# Patient Record
Sex: FEMALE | Race: WHITE | NOT HISPANIC OR LATINO | Employment: OTHER | URBAN - METROPOLITAN AREA
[De-identification: names, ages, dates, MRNs, and addresses within clinical notes are randomized per-mention and may not be internally consistent; named-entity substitution may affect disease eponyms.]

---

## 2019-05-14 ASSESSMENT — MIFFLIN-ST. JEOR: SCORE: 1079.84

## 2019-05-15 ENCOUNTER — COMMUNICATION - HEALTHEAST (OUTPATIENT)
Dept: SCHEDULING | Facility: CLINIC | Age: 59
End: 2019-05-15

## 2019-05-15 ENCOUNTER — ANESTHESIA - HEALTHEAST (OUTPATIENT)
Dept: SURGERY | Facility: CLINIC | Age: 59
End: 2019-05-15

## 2019-05-15 ENCOUNTER — SURGERY - HEALTHEAST (OUTPATIENT)
Dept: SURGERY | Facility: CLINIC | Age: 59
End: 2019-05-15

## 2019-05-15 ASSESSMENT — MIFFLIN-ST. JEOR: SCORE: 1215.47

## 2019-08-27 ENCOUNTER — ANESTHESIA - HEALTHEAST (OUTPATIENT)
Dept: SURGERY | Facility: CLINIC | Age: 59
End: 2019-08-27

## 2019-08-27 ASSESSMENT — MIFFLIN-ST. JEOR: SCORE: 1138.81

## 2019-08-28 ENCOUNTER — SURGERY - HEALTHEAST (OUTPATIENT)
Dept: SURGERY | Facility: CLINIC | Age: 59
End: 2019-08-28

## 2019-08-28 ASSESSMENT — MIFFLIN-ST. JEOR: SCORE: 1134.56

## 2020-03-09 ENCOUNTER — TRANSCRIBE ORDERS (OUTPATIENT)
Dept: OTHER | Age: 60
End: 2020-03-09

## 2020-03-09 DIAGNOSIS — H46.12 RETROBULBAR NEURITIS OF LEFT EYE: Primary | ICD-10-CM

## 2021-05-26 ENCOUNTER — RECORDS - HEALTHEAST (OUTPATIENT)
Dept: ADMINISTRATIVE | Facility: CLINIC | Age: 61
End: 2021-05-26

## 2021-05-27 ENCOUNTER — RECORDS - HEALTHEAST (OUTPATIENT)
Dept: ADMINISTRATIVE | Facility: CLINIC | Age: 61
End: 2021-05-27

## 2021-05-28 ENCOUNTER — RECORDS - HEALTHEAST (OUTPATIENT)
Dept: ADMINISTRATIVE | Facility: CLINIC | Age: 61
End: 2021-05-28

## 2021-05-28 NOTE — TELEPHONE ENCOUNTER
"\"Why can't I shower? I am itching and I need to shower? Can't I put a bag over my stitches.\"     Patient states she it itching all over and cannot stand it any longer.   -Thinks it is from what she was cleaned with during the procedure.   -Itching is all over her back side    Had an interstim placed today by Dr. Muñiz at [facility] at [facility].     Okay to leave a detailed message on voicemail.     Called [facility] for the patient to have the On-Call provider call her to discuss her options.   -Dr. Edenilson bernabe.     Reason for Disposition    [1] Caller has URGENT question AND [2] triager unable to answer question    Protocols used: POST-OP SYMPTOMS AND [id number] Max RN Triage Nurse Advisor Care Connection    "

## 2021-05-28 NOTE — ANESTHESIA POSTPROCEDURE EVALUATION
Patient: Rukhsana Griffith  NEW INTERSTIM LEAD, REPLACE BATTERY, PARTIAL REMOVAL OLD LEAD  Anesthesia type: MAC    Patient location: Phase II Recovery  Last vitals:   Vitals Value Taken Time   /66 5/15/2019 12:20 PM   Temp 36.4  C (97.6  F) 5/15/2019 11:47 AM   Pulse 105 5/15/2019 12:44 PM   Resp 16 5/15/2019 12:20 PM   SpO2 92 % 5/15/2019  1:08 PM   Vitals shown include unvalidated device data.  Post vital signs: stable  Level of consciousness: awake and responds to simple questions  Post-anesthesia pain: pain controlled  Post-anesthesia nausea and vomiting: no  Pulmonary: unassisted, return to baseline  Cardiovascular: stable and blood pressure at baseline  Hydration: adequate  Anesthetic events: no    QCDR Measures:  ASA# 11 - Lesa-op Cardiac Arrest: ASA11B - Patient did NOT experience unanticipated cardiac arrest  ASA# 12 - Lesa-op Mortality Rate: ASA12B - Patient did NOT die  ASA# 13 - PACU Re-Intubation Rate: NA - No ETT / LMA used for case  ASA# 10 - Composite Anes Safety: ASA10A - No serious adverse event    Additional Notes:

## 2021-05-28 NOTE — ANESTHESIA CARE TRANSFER NOTE
Last vitals:   Vitals:    05/15/19 1147   BP: (P) 117/58   Pulse: (P) 99   Resp: (P) 14   Temp: (P) 36.4  C (97.6  F)   SpO2: (P) 98%     Patient's level of consciousness is drowsy  Spontaneous respirations: yes  Maintains airway independently: yes  Dentition unchanged: yes  Oropharynx: oropharynx clear of all foreign objects    QCDR Measures:ASA# 20 - Surgical Safety Checklist: WHO surgical safety checklist completed prior to induction    PQRS# 430 - Adult PONV Prevention: 4558F - Pt received => 2 anti-emetic agents (different classes) preop & intraop  ASA# 8 - Peds PONV Prevention: NA - Not pediatric patient, not GA or 2 or more risk factors NOT present  PQRS# 424 - Lesa-op Temp Management: 4559F - At least one body temp DOCUMENTED => 35.5C or 95.9F within required timeframe  PQRS# 426 - PACU Transfer Protocol: - Transfer of care checklist used  ASA# 14 - Acute Post-op Pain: ASA14B - Patient did NOT experience pain >= 7 out of 10

## 2021-05-28 NOTE — ANESTHESIA PREPROCEDURE EVALUATION
Anesthesia Evaluation      No history of anesthetic complications     Airway   Mallampati: III  Neck ROM: full   Pulmonary    (+) COPD ( took nebs this am) moderate, sleep apnea (noncompliant), wheezes (mild end expiratory), a smoker                         Cardiovascular   (+) hypertension, ,     (-) CAD  ECG reviewed  Rhythm: regular  Rate: normal,         Neuro/Psych    (+) seizures (last seizure one week ago),     Comments: Interstim for urge incontinence, s/f battery replacement    Schizoaffective disorder, bipolar 1    Endo/Other    (+) diabetes mellitus type 2 using insulin,      GI/Hepatic/Renal    (+) GERD well controlled,       Comments: Hx of gastroparesis, well controlled with gastric pacemaker. No sx of early satiety or nausea.           Dental                         Anesthesia Plan  Planned anesthetic: MAC  Local per surgeon  Plan for propofol gtt w/ ketamine / fentanyl PRN for pain.  Discussed potential need to convert to GA w/ ETT vs LMA if not tolerating.  Explained that it is possible to remember certain aspects of the OR experience during MAC dependent on the depth of sedation and patient understands this.    ASA 3

## 2021-05-29 ENCOUNTER — RECORDS - HEALTHEAST (OUTPATIENT)
Dept: ADMINISTRATIVE | Facility: CLINIC | Age: 61
End: 2021-05-29

## 2021-05-30 ENCOUNTER — RECORDS - HEALTHEAST (OUTPATIENT)
Dept: ADMINISTRATIVE | Facility: CLINIC | Age: 61
End: 2021-05-30

## 2021-05-31 NOTE — ANESTHESIA POSTPROCEDURE EVALUATION
Patient: Rukhsana Griffith  NEW INTERSTIM LEAD, REPLACE OLD, REPLACE BATTERY  Anesthesia type: MAC    Patient location: Phase II Recovery  Last vitals: No vitals data found for the desired time range.    Post vital signs: stable  Level of consciousness: awake and responds to simple questions  Post-anesthesia pain: pain controlled  Post-anesthesia nausea and vomiting: no  Pulmonary: unassisted, return to baseline  Cardiovascular: stable and blood pressure at baseline  Hydration: adequate  Anesthetic events: no    QCDR Measures:  ASA# 11 - Lesa-op Cardiac Arrest: ASA11B - Patient did NOT experience unanticipated cardiac arrest  ASA# 12 - Lesa-op Mortality Rate: ASA12B - Patient did NOT die  ASA# 13 - PACU Re-Intubation Rate: NA - No ETT / LMA used for case  ASA# 10 - Composite Anes Safety: ASA10A - No serious adverse event    Additional Notes:

## 2021-05-31 NOTE — ANESTHESIA PREPROCEDURE EVALUATION
Anesthesia Evaluation      No history of anesthetic complications     Airway   Mallampati: III  Neck ROM: full   Pulmonary    (+) COPD ( took nebs this am) moderate, sleep apnea (noncompliant), wheezes (mild end expiratory), a smoker                         Cardiovascular   (+) hypertension, ,     (-) CAD  ECG reviewed  Rhythm: regular  Rate: normal,         Neuro/Psych    (+) seizures (last seizure one week ago),     Comments: Interstim for urge incontinence, s/f battery replacement    Schizoaffective disorder, bipolar 1    Endo/Other    (+) diabetes mellitus type 2 using insulin,      GI/Hepatic/Renal    (+) GERD well controlled,       Comments: Hx of gastroparesis, well controlled with gastric pacemaker. No sx of early satiety or nausea.           Dental                         Anesthesia Plan  Planned anesthetic: MAC  Local per surgeon  Plan for propofol gtt w/ ketamine / fentanyl PRN for pain.  Discussed potential need to convert to GA w/ ETT vs LMA if not tolerating.  Explained that it is possible to remember certain aspects of the OR experience during MAC dependent on the depth of sedation and patient understands this.    ASA 3                 Anesthesia Evaluation      Patient summary reviewed   No history of anesthetic complications     Airway   Mallampati: II  Neck ROM: full   Pulmonary - normal exam   (+) COPD moderate, asthma  moderate, sleep apnea on CPAP, ,                          Cardiovascular - normal exam  (+) hypertension, ,      Neuro/Psych    (+) neuromuscular disease,      Endo/Other    (+) diabetes mellitus,      GI/Hepatic/Renal    (+) GERD,             Dental - normal exam                        Anesthesia Plan  Planned anesthetic: MAC    ASA 3     Anesthetic plan and risks discussed with: patient  Anesthesia plan special considerations: antiemetics,   Post-op plan: routine recovery

## 2021-05-31 NOTE — ANESTHESIA CARE TRANSFER NOTE
Last vitals:   Vitals:    08/28/19 1145   BP: 147/69   Pulse: 93   Resp: 18   Temp: 36.3  C (97.3  F)   SpO2: 100%     Patient's level of consciousness is awake  Spontaneous respirations: yes  Maintains airway independently: yes  Dentition unchanged: yes  Oropharynx: oropharynx clear of all foreign objects    QCDR Measures:  ASA# 20 - Surgical Safety Checklist: WHO surgical safety checklist completed prior to induction    PQRS# 430 - Adult PONV Prevention: 4558F-8P - Pt did NOT receive => 2 anti-emetic agents  ASA# 8 - Peds PONV Prevention: NA - Not pediatric patient, not GA or 2 or more risk factors NOT present  PQRS# 424 - Lesa-op Temp Management: 4559F - At least one body temp DOCUMENTED => 35.5C or 95.9F within required timeframe  PQRS# 426 - PACU Transfer Protocol: - Transfer of care checklist used  ASA# 14 - Acute Post-op Pain: ASA14B - Patient did NOT experience pain >= 7 out of 10

## 2021-06-03 VITALS — WEIGHT: 143.06 LBS | BODY MASS INDEX: 28.84 KG/M2 | HEIGHT: 59 IN

## 2021-06-03 VITALS — WEIGHT: 153.9 LBS | BODY MASS INDEX: 29.06 KG/M2 | HEIGHT: 61 IN

## 2021-06-16 PROBLEM — R07.9 CHEST PAIN: Status: ACTIVE | Noted: 2017-09-03

## 2021-06-16 PROBLEM — E16.2 HYPOGLYCEMIA: Status: ACTIVE | Noted: 2018-02-04

## 2021-06-16 PROBLEM — D50.9 MICROCYTIC HYPOCHROMIC ANEMIA: Status: ACTIVE | Noted: 2018-02-04

## 2021-06-16 PROBLEM — K30 DELAYED GASTRIC EMPTYING: Status: ACTIVE | Noted: 2018-02-04

## 2021-06-16 PROBLEM — R10.9 ABDOMINAL PAIN: Status: ACTIVE | Noted: 2018-02-04

## 2022-05-26 ENCOUNTER — TRANSCRIBE ORDERS (OUTPATIENT)
Dept: OTHER | Age: 62
End: 2022-05-26
Payer: MEDICAID

## 2022-05-26 DIAGNOSIS — H53.462 LEFT HOMONYMOUS HEMIANOPSIA: Primary | ICD-10-CM

## 2022-05-26 DIAGNOSIS — H46.13 RETROBULBAR NEURITIS OF BOTH EYES: ICD-10-CM

## 2022-12-19 ENCOUNTER — APPOINTMENT (OUTPATIENT)
Dept: RADIOLOGY | Facility: HOSPITAL | Age: 62
End: 2022-12-19
Attending: EMERGENCY MEDICINE
Payer: MEDICAID

## 2022-12-19 ENCOUNTER — HOSPITAL ENCOUNTER (EMERGENCY)
Facility: HOSPITAL | Age: 62
Discharge: HOME | End: 2022-12-19
Attending: EMERGENCY MEDICINE | Admitting: EMERGENCY MEDICINE
Payer: MEDICAID

## 2022-12-19 VITALS
SYSTOLIC BLOOD PRESSURE: 135 MMHG | OXYGEN SATURATION: 96 % | WEIGHT: 152 LBS | BODY MASS INDEX: 30.64 KG/M2 | TEMPERATURE: 97.9 F | RESPIRATION RATE: 18 BRPM | DIASTOLIC BLOOD PRESSURE: 68 MMHG | HEART RATE: 88 BPM | HEIGHT: 59 IN

## 2022-12-19 DIAGNOSIS — M25.562 ACUTE PAIN OF LEFT KNEE: ICD-10-CM

## 2022-12-19 PROBLEM — F15.11 HISTORY OF METHAMPHETAMINE ABUSE (H): Status: ACTIVE | Noted: 2022-07-28

## 2022-12-19 PROBLEM — H46.13: Status: ACTIVE | Noted: 2022-05-26

## 2022-12-19 PROBLEM — M54.81 OCCIPITAL NEURALGIA OF LEFT SIDE: Status: ACTIVE | Noted: 2020-02-07

## 2022-12-19 PROBLEM — J30.89 PERENNIAL ALLERGIC RHINITIS: Status: ACTIVE | Noted: 2018-06-28

## 2022-12-19 PROBLEM — J98.4 CALCIFIED GRANULOMA OF LUNG: Status: ACTIVE | Noted: 2018-11-19

## 2022-12-19 PROBLEM — R41.82 ALTERED MENTAL STATUS, UNSPECIFIED: Status: ACTIVE | Noted: 2022-05-14

## 2022-12-19 PROBLEM — F51.04 PSYCHOPHYSIOLOGICAL INSOMNIA: Status: ACTIVE | Noted: 2017-11-10

## 2022-12-19 PROBLEM — R94.31 PROLONGED Q-T INTERVAL ON ECG: Status: ACTIVE | Noted: 2022-06-16

## 2022-12-19 PROBLEM — J44.9 COPD MIXED TYPE (H): Status: ACTIVE | Noted: 2022-12-19

## 2022-12-19 PROBLEM — M54.16 RIGHT LUMBAR RADICULOPATHY: Status: ACTIVE | Noted: 2022-12-19

## 2022-12-19 PROBLEM — N39.3 STRESS INCONTINENCE OF URINE: Status: ACTIVE | Noted: 2021-09-13

## 2022-12-19 PROBLEM — H53.462 LEFT HOMONYMOUS HEMIANOPSIA: Status: ACTIVE | Noted: 2022-05-26

## 2022-12-19 PROBLEM — H90.A31 MIXED CONDUCTIVE AND SENSORINEURAL HEARING LOSS OF RIGHT EAR WITH RESTRICTED HEARING OF LEFT EAR: Status: ACTIVE | Noted: 2022-04-26

## 2022-12-19 PROBLEM — R10.2 PELVIC AND PERINEAL PAIN: Status: ACTIVE | Noted: 2019-04-02

## 2022-12-19 PROBLEM — R29.898 WEAKNESS OF RIGHT FOOT: Status: ACTIVE | Noted: 2021-09-13

## 2022-12-19 PROBLEM — H90.A22 SENSORINEURAL HEARING LOSS (SNHL) OF LEFT EAR WITH RESTRICTED HEARING OF RIGHT EAR: Status: ACTIVE | Noted: 2022-04-26

## 2022-12-19 PROBLEM — D50.9 IRON DEFICIENCY ANEMIA: Status: ACTIVE | Noted: 2022-12-19

## 2022-12-19 PROBLEM — H92.01 OTALGIA, RIGHT: Status: ACTIVE | Noted: 2022-04-26

## 2022-12-19 PROCEDURE — 250N000011 HC RX IP 250 OP 636: Performed by: EMERGENCY MEDICINE

## 2022-12-19 PROCEDURE — 99285 EMERGENCY DEPT VISIT HI MDM: CPT | Mod: 25

## 2022-12-19 PROCEDURE — 73562 X-RAY EXAM OF KNEE 3: CPT | Mod: LT

## 2022-12-19 PROCEDURE — 96372 THER/PROPH/DIAG INJ SC/IM: CPT | Performed by: EMERGENCY MEDICINE

## 2022-12-19 PROCEDURE — 29505 APPLICATION LONG LEG SPLINT: CPT | Mod: LT

## 2022-12-19 RX ORDER — MORPHINE SULFATE 4 MG/ML
4 INJECTION, SOLUTION INTRAMUSCULAR; INTRAVENOUS ONCE
Status: DISCONTINUED | OUTPATIENT
Start: 2022-12-19 | End: 2022-12-19

## 2022-12-19 RX ORDER — MORPHINE SULFATE 4 MG/ML
4 INJECTION, SOLUTION INTRAMUSCULAR; INTRAVENOUS ONCE
Status: COMPLETED | OUTPATIENT
Start: 2022-12-19 | End: 2022-12-19

## 2022-12-19 RX ADMIN — MORPHINE SULFATE 4 MG: 4 INJECTION INTRAVENOUS at 17:47

## 2022-12-19 ASSESSMENT — ACTIVITIES OF DAILY LIVING (ADL): ADLS_ACUITY_SCORE: 35

## 2022-12-19 NOTE — ED PROVIDER NOTES
EMERGENCY DEPARTMENT ENCOUNTER      NAME: Rukhsana Griffith  AGE: 62 year old female  YOB: 1960  MRN: [id number]  EVALUATION DATE & TIME: 12/19/2022  5:29 PM    PCP: Kei De Leon    ED PROVIDER: Brynn Romero MD    Chief Complaint   Patient presents with     Knee Injury         FINAL IMPRESSION:  1. Acute pain of left knee          ED COURSE & MEDICAL DECISION MAKING:    Pertinent Labs & Imaging studies reviewed. (See chart for details)  62 year old female with history of DM, COPD, HLD, methamphetamine abuse, schizoaffective and seizure disorder who presents to the Emergency Department for evaluation of left-sided knee pain after feeling a pop when going from a seated to a standing position.  Given minimal/if at all no trauma my concern for underlying fracture is low.  Overall favor possible self reduced patellar dislocation as she does have a history of same.  Differential includes meniscus or ligamentous injury.    Patient given IM morphine for pain.  X-ray unremarkable.  No signs of fracture.  Also no effusion for internal derangement to the knee.  Placed in knee immobilizer.  Able to ambulate with walker.  Will be discharged back to care center.           5:29 PM Introduced myself to the patient, obtained history of present illness, and performed initial physical exam at this time. PPE: Provider wore gloves, N95 mask.     6:36 PM Checked on the patient and updated her on the current treatment plan.    Medical Decision Making    History:    Supplemental history from: Documented in HPI, if applicable    External Record(s) reviewed: Documented in HPI, if applicable.    Work Up:    Chart documentation includes differential considered and any EKGs or imaging interpreted by provider.    In additional to work up documented, I considered the following work up: See chart documentation, if applicable.    External consultation:    Discussion of management with another provider: See chart  documentation, if applicable    Complicating factors:    Care impacted by chronic illness: Chronic Lung Disease and Diabetes    Care affected by social determinants of health: N/A    Disposition considerations: Discharge. I prescribed additional prescription strength medication(s) as charted. N/A.        At the conclusion of the encounter I discussed the results of all of the tests and the disposition. The questions were answered. The patient or family acknowledged understanding and was agreeable with the care plan.      MEDICATIONS GIVEN IN THE EMERGENCY:  Medications   morphine (PF) injection 4 mg (4 mg Intramuscular Given 12/19/22 2077)       NEW PRESCRIPTIONS STARTED AT TODAY'S ER VISIT  Discharge Medication List as of 12/19/2022  7:29 PM      START taking these medications    Details   acetaminophen-codeine (TYLENOL #3) 300-30 MG tablet Take 1 tablet by mouth every 6 hours as needed for severe pain (7-10), Disp-10 tablet, R-0, Local Print                =================================================================    HPI    Patient information was obtained from: the patient    Use of Intrepreter: N/A      Rukhsana Griffith is a 62 year old female with pertinent medical history of chronic hyponatremia, dyslipidemia, schizoaffective disorder, seizure disorder, and type II DM, who presents for evaluation of knee pain.    Earlier this evening, the patient was seated with her legs crossed. When she tried to stand up, she felt her left knee pop and had sudden onset of severe knee pain. She rates the pain a 10/10. She is unable to bear weight on the knee. The patient reports a history of patellar dislocation in this knee that warranted surgical correction. She states that the pain is located on the front and medial aspects of the knee. No pain to the back of the knee. No pain medication at home prior to ED arrival. No other complaints at this time.      REVIEW OF SYSTEMS  Constitutional:  Denies fever, chills,  weight loss or weakness  GI:  Denies abdominal pain, nausea, vomiting, diarrhea  : Denies dysuria, denies hematuria  Musculoskeletal:  Positive for left knee pain.   Skin:  Denies rash, pallor  Neurologic:  Denies headache, focal weakness or sensory changes    All other systems negative unless noted in HPI.      PAST MEDICAL HISTORY:  Past Medical History:   Diagnosis Date     COPD (chronic obstructive pulmonary disease) (H)      Diabetes (H)      HLD (hyperlipidemia)      Methamphetamine abuse (H)      Schizoaffective disorder (H)      Seizures (H)        PAST SURGICAL HISTORY:  Past Surgical History:   Procedure Laterality Date     HYSTERECTOMY       KNEE SURGERY       OTHER SURGICAL HISTORY      gastric pacemaker     OTHER SURGICAL HISTORY      interstim placement     WA REVISE/REMOVE PERIPH/GASTRIC NEUROSTIM/ N/A 5/15/2019    Procedure: PARTIAL REMOVAL OLD LEAD;  Surgeon: Jcarlos Muñiz MD;  Location: [facility];  Service: Urology     RELEASE CARPAL TUNNEL         CURRENT MEDICATIONS:    Prior to Admission Medications   Prescriptions Last Dose Informant Patient Reported? Taking?   EPINEPHrine (EPIPEN) 0.3 mg/0.3 mL atIn   Yes No   Sig: [EPINEPHRINE (EPIPEN) 0.3 MG/0.3 ML ATIN] Inject 0.3 mg into the shoulder, thigh, or buttocks as needed.   SUMAtriptan (IMITREX) 100 MG tablet   Yes No   Sig: [SUMATRIPTAN (IMITREX) 100 MG TABLET] Take 100 mg by mouth every 2 (two) hours as needed for migraine.   acetaminophen (TYLENOL) 160 mg/5 mL (5 mL) Soln solution   Yes No   Sig: [ACETAMINOPHEN (TYLENOL) 160 MG/5 ML (5 ML) SOLN SOLUTION] Take 10 mL by mouth every 4 (four) hours as needed (Pain).          albuterol (PROAIR HFA;PROVENTIL HFA;VENTOLIN HFA) 90 mcg/actuation inhaler   Yes No   Sig: [ALBUTEROL (PROAIR HFA;PROVENTIL HFA;VENTOLIN HFA) 90 MCG/ACTUATION INHALER] Inhale 2 puffs every 6 (six) hours as needed for wheezing.   albuterol (PROVENTIL) 2.5 mg /3 mL (0.083 %) nebulizer solution   Yes No    Sig: [ALBUTEROL (PROVENTIL) 2.5 MG /3 ML (0.083 %) NEBULIZER SOLUTION] Take 2.5 mg by nebulization every 4 (four) hours as needed for wheezing.          aluminum-magnesium hydroxide-simethicone (MAALOX MAX STRENGTH) 400-400-40 mg/5 mL suspension   Yes No   Sig: [ALUMINUM-MAGNESIUM HYDROXIDE-SIMETHICONE (MAALOX MAX STRENGTH) 400-400-40 MG/5 ML SUSPENSION] Take 5 mL by mouth every 4 (four) hours as needed for indigestion.          ammonium lactate (AMLACTIN) 12 % cream   Yes No   Sig: [AMMONIUM LACTATE (AMLACTIN) 12 % CREAM] Apply 1 application topically 2 (two) times a day.          atropine 1 % ophthalmic solution   Yes No   Sig: [ATROPINE 1 % OPHTHALMIC SOLUTION] Place 1-2 drops under the tongue 2 (two) times a day. Takes under tongue to stop drooling         budesonide-formoterol (SYMBICORT) 160-4.5 mcg/actuation inhaler   Yes No   Sig: [BUDESONIDE-FORMOTEROL (SYMBICORT) 160-4.5 MCG/ACTUATION INHALER] Inhale 2 puffs 2 (two) times a day.          cholecalciferol, vitamin D3, 2,000 unit Tab   Yes No   Sig: [CHOLECALCIFEROL, VITAMIN D3, 2,000 UNIT TAB] Take 2,000 Units by mouth daily.   cloZAPine (CLOZARIL) 100 MG tablet   Yes No   Sig: [CLOZAPINE (CLOZARIL) 100 MG TABLET] Take 200 mg by mouth at bedtime.          diphenhydrAMINE (BENADRYL) 25 mg capsule   Yes No   Sig: [DIPHENHYDRAMINE (BENADRYL) 25 MG CAPSULE] Take 25 mg by mouth at bedtime as needed for itching.   docusate sodium (COLACE) 100 MG capsule   Yes No   Sig: [DOCUSATE SODIUM (COLACE) 100 MG CAPSULE] Take 100 mg by mouth 2 (two) times a day.          escitalopram oxalate (LEXAPRO) 10 MG tablet   Yes No   Sig: [ESCITALOPRAM OXALATE (LEXAPRO) 10 MG TABLET] Take 10 mg by mouth daily.          famotidine (PEPCID) 40 MG tablet   Yes No   Sig: [FAMOTIDINE (PEPCID) 40 MG TABLET] Take 40 mg by mouth 2 (two) times a day.   fluoride, sodium, (DENTAGEL) 1.1 % Gel dental gel   Yes No   Sig: [FLUORIDE, SODIUM, (DENTAGEL) 1.1 % GEL DENTAL GEL] Apply 1 application to  teeth at bedtime.   glucagon, human recombinant, (GLUCAGEN HYPOKIT) 1 mg injection   Yes No   Sig: [GLUCAGON, HUMAN RECOMBINANT, (GLUCAGEN HYPOKIT) 1 MG INJECTION] Infuse 1 mg into a venous catheter once as needed (Low blood sugars).          insulin aspart U-100 (NOVOLOG) 100 unit/mL injection   Yes No   Sig: [INSULIN ASPART U-100 (NOVOLOG) 100 UNIT/ML INJECTION] Inject 15 Units under the skin 3 (three) times a day before meals. Sliding scale          insulin glargine (LANTUS) 100 unit/mL injection   Yes No   Sig: [INSULIN GLARGINE (LANTUS) 100 UNIT/ML INJECTION] Inject 25 Units under the skin every morning.          ipratropium-albuterol (DUO-NEB) 0.5-2.5 mg/3 mL nebulizer   Yes No   Sig: [IPRATROPIUM-ALBUTEROL (DUO-NEB) 0.5-2.5 MG/3 ML NEBULIZER] Take 3 mL by nebulization 4 (four) times a day as needed.          lisinopril (PRINIVIL,ZESTRIL) 10 MG tablet   Yes No   Sig: [LISINOPRIL (PRINIVIL,ZESTRIL) 10 MG TABLET] Take 10 mg by mouth daily.   loratadine (CLARITIN) 10 mg tablet   Yes No   Sig: [LORATADINE (CLARITIN) 10 MG TABLET] Take 10 mg by mouth daily.   lubiprostone (AMITIZA) 24 MCG capsule   Yes No   Sig: [LUBIPROSTONE (AMITIZA) 24 MCG CAPSULE] Take 24 mcg by mouth 2 (two) times a day.   magnesium hydroxide (MILK OF MAG) 400 mg/5 mL Susp suspension   Yes No   Sig: [MAGNESIUM HYDROXIDE (MILK OF MAG) 400 MG/5 ML SUSP SUSPENSION] Take 30 mL by mouth 2 (two) times a day as needed.          mometasone (NASONEX) 50 mcg/actuation nasal spray   Yes No   Sig: [MOMETASONE (NASONEX) 50 MCG/ACTUATION NASAL SPRAY] 2 sprays into each nostril daily.   multivitamin with minerals (THERA-M) 9 mg iron-400 mcg Tab tablet   Yes No   Sig: [MULTIVITAMIN WITH MINERALS (THERA-M) 9 MG IRON-400 MCG TAB TABLET] Take 1 tablet by mouth daily.   nicotine (NICODERM CQ) 21 mg/24 hr   Yes No   Sig: [NICOTINE (NICODERM CQ) 21 MG/24 HR] Place 1 patch on the skin daily as needed for smoking cessation.          oxyCODONE-acetaminophen  (PERCOCET/ENDOCET) 5-325 mg per tablet   No No   Sig: [OXYCODONE-ACETAMINOPHEN (PERCOCET/ENDOCET) 5-325 MG PER TABLET] Take 1 tablet by mouth every 6 (six) hours as needed for pain.   polyethylene glycol (MIRALAX) 17 gram packet   Yes No   Sig: [POLYETHYLENE GLYCOL (MIRALAX) 17 GRAM PACKET] Take 17 g by mouth 2 (two) times a day.          pravastatin (PRAVACHOL) 40 MG tablet   Yes No   Sig: [PRAVASTATIN (PRAVACHOL) 40 MG TABLET] Take 40 mg by mouth every morning.          ramelteon (ROZEREM) 8 mg tablet   Yes No   Sig: [RAMELTEON (ROZEREM) 8 MG TABLET] Take 8 mg by mouth at bedtime.   topiramate (TOPAMAX) 200 MG tablet   Yes No   Sig: [TOPIRAMATE (TOPAMAX) 200 MG TABLET] Take 200 mg by mouth 2 (two) times a day.   triamcinolone (KENALOG) 0.1 % ointment   Yes No   Sig: [TRIAMCINOLONE (KENALOG) 0.1 % OINTMENT] Apply 1 application topically 2 (two) times a day.   trimethobenzamide (TIGAN) 300 mg capsule   Yes No   Sig: [TRIMETHOBENZAMIDE (TIGAN) 300 MG CAPSULE] Take 300 mg by mouth 3 (three) times a day as needed (N/V).      Facility-Administered Medications: None       ALLERGIES:  Allergies   Allergen Reactions     Aspirin Unknown     Bee Sting Kit [Bee Venom] Anaphylaxis     Botox [Onabotulinumtoxina] Hives and Shortness Of Breath     Hymenoptera Allergenic Extract [Wasp Venom Protein] Anaphylaxis     Latex Shortness Of Breath     Sucralose Anaphylaxis     Codeine Other (See Comments)     Migraines      Diatrizoate Meglumine [Diatrizoate] Hives and Swelling     Dilaudid [Hydromorphone] Other (See Comments)     Seizures     Garlic Hives     Green Pepper [Capsicum] Hives     Hydrocodone Hives     Hydroxyzine Hives and Other (See Comments)     Dyspnea     Iodinated Contrast Media [Diagnostic X-Ray Materials] Hives and Other (See Comments)     EDEMA     Latex Unknown     Added based on information entered during log entry, please review and add reactions, type, and severity as needed     Lidocaine Hives     Metformin  "Other (See Comments)     \"Stomach bleeding and kidney infection\"     Onion Hives     Procaine Hives     Shellfish Containing Products [Shellfish-Derived Products] Unknown     Sulfa (Sulfonamide Antibiotics) [Sulfa Drugs] Unknown     Adhesive Tape-Silicones [Adhesive Tape] Rash     Cephalosporins Rash     Chlorpromazine Rash     THORAZINE     Chlorpropamide Rash     Coconut Oil Rash     Darvocet A500 [Propoxyphene N-Apap] Rash     Demerol [Meperidine] Rash     Diazepam Rash     Dipyridamole Rash     Fentanyl Rash     Fluoxetine Rash     Furosemide Rash     Glyburide Rash and Other (See Comments)     Hypoglycemia     Haloperidol Rash     Lithium Rash     Nuts - Unspecified [Nuts] Rash     Almonds specifically      Ondansetron Rash     Oxycontin [Oxycodone] Rash     Penicillins Rash and Other (See Comments)     Migraines, dizzy and sweating     Thallium-201 [Thallous Chloride Tl 201] Rash     Thioridazine Rash     Tizanidine Rash     Toradol [Ketorolac] Rash       FAMILY HISTORY:  Family History   Adopted: Yes       SOCIAL HISTORY:  Social History     Tobacco Use     Smoking status: Every Day     Packs/day: 0.25     Types: Cigarettes     Smokeless tobacco: Never   Substance Use Topics     Alcohol use: Not Currently     Drug use: Not Currently        VITALS:  Patient Vitals for the past 24 hrs:   BP Temp Temp src Pulse Resp SpO2 Height Weight   12/19/22 1945 135/68 -- -- 88 18 96 % -- --   12/19/22 1737 139/65 97.9  F (36.6  C) Oral 93 20 96 % 1.499 m (4' 11\") 68.9 kg (152 lb)       PHYSICAL EXAM    General Appearance: Well-appearing, well-nourished, no acute distress Dexcom present in left arm  Head:  Normocephalic  Eyes:  PERRL, conjunctiva/corneas clear  ENT:  membranes are moist without pallor  Cardio:  Regular rate and rhythm  Pulm:  No respiratory distress  Abdomen:  Soft, non-tender, non distended,no rebound or guarding.  Extremities: No obvious deformities or trauma of left knee. Tenderness with ROM of patella " and along medial joint line. Patient will not allow me to range the knee or do ligamentus and/or meniscus testing secondary to pain.  Otherwise, extremities unremarkable with pain-free range of motion  Skin:  Skin warm, dry, no rashes  Neuro:  Alert and oriented ×3, moving all extremities, no gross sensory defects     RADIOLOGY/LABS:  Reviewed all pertinent imaging. Please see official radiology report. All pertinent labs reviewed and interpreted.    Results for orders placed or performed during the hospital encounter of 12/19/22   XR Knee Left 3 Views    Impression    IMPRESSION: Normal joint spaces and alignment. No fracture or joint effusion.       The creation of this record is based on the scribe s observations of the work being performed by Brynn Romero MD and the provider s statements to them. It was created on her behalf by Lenka Hou, a trained medical scribe. This document has been checked and approved by the attending provider.    Brynn Romero MD  Emergency Medicine  [facility] EMERGENCY DEPARTMENT  [facility] [facility] [address]  [phone]  Dept: [phone]       Brynn Romero MD  12/19/22 8278

## 2022-12-19 NOTE — ED NOTES
"Bed: [facility]  Expected date:   Expected time:   Means of arrival:   Comments:  [facility] - 62 F \"pop\" in knee    "

## 2022-12-19 NOTE — ED TRIAGE NOTES
-Patient arrives from home via [facility] with complaints of left sided knee pain. Patient had legs crossed and was getting up, during this she heard/felt a pop sound in her left knee and had sudden pain. Patient reports previous knee dislocation and surgery. Patient alert.      Triage Assessment     Row Name 12/19/22 0289       Triage Assessment (Adult)    Airway WDL WDL       Cognitive/Neuro/Behavioral WDL    Cognitive/Neuro/Behavioral WDL WDL

## 2022-12-20 NOTE — ED NOTES
-Knee immobilizer applied to left leg.  -Patient assisted up with walker and gait belt, was able to walk 3 feet.

## 2023-03-04 ENCOUNTER — HOSPITAL ENCOUNTER (EMERGENCY)
Facility: HOSPITAL | Age: 63
Discharge: HOME | End: 2023-03-05
Attending: EMERGENCY MEDICINE | Admitting: EMERGENCY MEDICINE
Payer: MEDICAID

## 2023-03-04 DIAGNOSIS — R10.9 CHRONIC ABDOMINAL PAIN: ICD-10-CM

## 2023-03-04 DIAGNOSIS — G89.29 CHRONIC ABDOMINAL PAIN: ICD-10-CM

## 2023-03-04 LAB
ALBUMIN UR-MCNC: NEGATIVE MG/DL
APPEARANCE UR: CLEAR
BACTERIA #/AREA URNS HPF: ABNORMAL /HPF
BASOPHILS # BLD AUTO: 0.1 10E3/UL (ref 0–0.2)
BASOPHILS NFR BLD AUTO: 1 %
BILIRUB UR QL STRIP: NEGATIVE
COLOR UR AUTO: COLORLESS
EOSINOPHIL # BLD AUTO: 0.4 10E3/UL (ref 0–0.7)
EOSINOPHIL NFR BLD AUTO: 3 %
ERYTHROCYTE [DISTWIDTH] IN BLOOD BY AUTOMATED COUNT: 14.2 % (ref 10–15)
GLUCOSE UR STRIP-MCNC: NEGATIVE MG/DL
HCT VFR BLD AUTO: 41.5 % (ref 35–47)
HGB BLD-MCNC: 13.9 G/DL (ref 11.7–15.7)
HGB UR QL STRIP: NEGATIVE
IMM GRANULOCYTES # BLD: 0.2 10E3/UL
IMM GRANULOCYTES NFR BLD: 2 %
KETONES UR STRIP-MCNC: NEGATIVE MG/DL
LACTATE SERPL-SCNC: 0.7 MMOL/L (ref 0.7–2)
LEUKOCYTE ESTERASE UR QL STRIP: NEGATIVE
LYMPHOCYTES # BLD AUTO: 4 10E3/UL (ref 0.8–5.3)
LYMPHOCYTES NFR BLD AUTO: 27 %
MCH RBC QN AUTO: 31.4 PG (ref 26.5–33)
MCHC RBC AUTO-ENTMCNC: 33.5 G/DL (ref 31.5–36.5)
MCV RBC AUTO: 94 FL (ref 78–100)
MONOCYTES # BLD AUTO: 1.5 10E3/UL (ref 0–1.3)
MONOCYTES NFR BLD AUTO: 10 %
NEUTROPHILS # BLD AUTO: 8.9 10E3/UL (ref 1.6–8.3)
NEUTROPHILS NFR BLD AUTO: 57 %
NITRATE UR QL: NEGATIVE
NRBC # BLD AUTO: 0 10E3/UL
NRBC BLD AUTO-RTO: 0 /100
PH UR STRIP: 7 [PH] (ref 5–7)
PLATELET # BLD AUTO: 205 10E3/UL (ref 150–450)
RBC # BLD AUTO: 4.43 10E6/UL (ref 3.8–5.2)
RBC URINE: 0 /HPF
SP GR UR STRIP: 1 (ref 1–1.03)
SQUAMOUS EPITHELIAL: <1 /HPF
UROBILINOGEN UR STRIP-MCNC: <2 MG/DL
WBC # BLD AUTO: 14.9 10E3/UL (ref 4–11)
WBC URINE: 1 /HPF

## 2023-03-04 PROCEDURE — 96361 HYDRATE IV INFUSION ADD-ON: CPT

## 2023-03-04 PROCEDURE — 81001 URINALYSIS AUTO W/SCOPE: CPT | Performed by: EMERGENCY MEDICINE

## 2023-03-04 PROCEDURE — 85004 AUTOMATED DIFF WBC COUNT: CPT | Performed by: EMERGENCY MEDICINE

## 2023-03-04 PROCEDURE — 36415 COLL VENOUS BLD VENIPUNCTURE: CPT | Performed by: EMERGENCY MEDICINE

## 2023-03-04 PROCEDURE — 80053 COMPREHEN METABOLIC PANEL: CPT | Performed by: EMERGENCY MEDICINE

## 2023-03-04 PROCEDURE — 99285 EMERGENCY DEPT VISIT HI MDM: CPT | Mod: 25

## 2023-03-04 PROCEDURE — 258N000003 HC RX IP 258 OP 636: Performed by: EMERGENCY MEDICINE

## 2023-03-04 PROCEDURE — 83605 ASSAY OF LACTIC ACID: CPT | Performed by: EMERGENCY MEDICINE

## 2023-03-04 RX ORDER — DIPHENHYDRAMINE HYDROCHLORIDE 50 MG/ML
25 INJECTION INTRAMUSCULAR; INTRAVENOUS ONCE
Status: COMPLETED | OUTPATIENT
Start: 2023-03-04 | End: 2023-03-05

## 2023-03-04 RX ORDER — SODIUM CHLORIDE 9 MG/ML
1000 INJECTION, SOLUTION INTRAVENOUS CONTINUOUS
Status: DISCONTINUED | OUTPATIENT
Start: 2023-03-04 | End: 2023-03-05 | Stop reason: HOSPADM

## 2023-03-04 RX ORDER — MORPHINE SULFATE 4 MG/ML
4 INJECTION, SOLUTION INTRAMUSCULAR; INTRAVENOUS ONCE
Status: DISCONTINUED | OUTPATIENT
Start: 2023-03-04 | End: 2023-03-04

## 2023-03-04 RX ORDER — MORPHINE SULFATE 4 MG/ML
6 INJECTION, SOLUTION INTRAMUSCULAR; INTRAVENOUS ONCE
Status: COMPLETED | OUTPATIENT
Start: 2023-03-04 | End: 2023-03-05

## 2023-03-04 RX ADMIN — SODIUM CHLORIDE 500 ML: 9 INJECTION, SOLUTION INTRAVENOUS at 23:51

## 2023-03-04 ASSESSMENT — ENCOUNTER SYMPTOMS
ABDOMINAL DISTENTION: 1
DYSURIA: 0
ABDOMINAL PAIN: 1
HEMATURIA: 0
FEVER: 0
VOMITING: 1
COUGH: 0
CHILLS: 1
SORE THROAT: 0
DIARRHEA: 1
FREQUENCY: 0
NAUSEA: 1
DIFFICULTY URINATING: 0

## 2023-03-04 ASSESSMENT — ACTIVITIES OF DAILY LIVING (ADL): ADLS_ACUITY_SCORE: 35

## 2023-03-05 ENCOUNTER — APPOINTMENT (OUTPATIENT)
Dept: CT IMAGING | Facility: HOSPITAL | Age: 63
End: 2023-03-05
Attending: EMERGENCY MEDICINE
Payer: MEDICAID

## 2023-03-05 ENCOUNTER — APPOINTMENT (OUTPATIENT)
Dept: ULTRASOUND IMAGING | Facility: HOSPITAL | Age: 63
End: 2023-03-05
Attending: EMERGENCY MEDICINE
Payer: MEDICAID

## 2023-03-05 VITALS
DIASTOLIC BLOOD PRESSURE: 61 MMHG | WEIGHT: 158 LBS | TEMPERATURE: 98.1 F | BODY MASS INDEX: 31.85 KG/M2 | HEIGHT: 59 IN | HEART RATE: 87 BPM | OXYGEN SATURATION: 91 % | SYSTOLIC BLOOD PRESSURE: 133 MMHG

## 2023-03-05 LAB
ALBUMIN SERPL BCG-MCNC: 3.4 G/DL (ref 3.5–5.2)
ALP SERPL-CCNC: 114 U/L (ref 35–104)
ALT SERPL W P-5'-P-CCNC: 17 U/L (ref 10–35)
ANION GAP SERPL CALCULATED.3IONS-SCNC: 8 MMOL/L (ref 7–15)
AST SERPL W P-5'-P-CCNC: 23 U/L (ref 10–35)
BILIRUB SERPL-MCNC: <0.2 MG/DL
BUN SERPL-MCNC: 10.8 MG/DL (ref 8–23)
CALCIUM SERPL-MCNC: 9.9 MG/DL (ref 8.8–10.2)
CHLORIDE SERPL-SCNC: 99 MMOL/L (ref 98–107)
CREAT SERPL-MCNC: 0.57 MG/DL (ref 0.51–0.95)
DEPRECATED HCO3 PLAS-SCNC: 24 MMOL/L (ref 22–29)
GFR SERPL CREATININE-BSD FRML MDRD: >90 ML/MIN/1.73M2
GLUCOSE SERPL-MCNC: 110 MG/DL (ref 70–99)
POTASSIUM SERPL-SCNC: 3.6 MMOL/L (ref 3.4–5.3)
PROT SERPL-MCNC: 6.6 G/DL (ref 6.4–8.3)
SODIUM SERPL-SCNC: 131 MMOL/L (ref 136–145)

## 2023-03-05 PROCEDURE — 96374 THER/PROPH/DIAG INJ IV PUSH: CPT | Mod: 59

## 2023-03-05 PROCEDURE — 999N000203 HC STATISTICAL VASC ACCESS NURSE TIME, 16-31 MINUTES

## 2023-03-05 PROCEDURE — 74177 CT ABD & PELVIS W/CONTRAST: CPT

## 2023-03-05 PROCEDURE — 999N000127 HC STATISTIC PERIPHERAL IV START W US GUIDANCE

## 2023-03-05 PROCEDURE — 76705 ECHO EXAM OF ABDOMEN: CPT

## 2023-03-05 PROCEDURE — 250N000011 HC RX IP 250 OP 636: Performed by: EMERGENCY MEDICINE

## 2023-03-05 RX ORDER — IOPAMIDOL 755 MG/ML
100 INJECTION, SOLUTION INTRAVASCULAR ONCE
Status: COMPLETED | OUTPATIENT
Start: 2023-03-05 | End: 2023-03-05

## 2023-03-05 RX ADMIN — DIPHENHYDRAMINE HYDROCHLORIDE 25 MG: 50 INJECTION, SOLUTION INTRAMUSCULAR; INTRAVENOUS at 00:20

## 2023-03-05 RX ADMIN — IOPAMIDOL 100 ML: 755 INJECTION, SOLUTION INTRAVENOUS at 00:31

## 2023-03-05 ASSESSMENT — ACTIVITIES OF DAILY LIVING (ADL)
ADLS_ACUITY_SCORE: 35
ADLS_ACUITY_SCORE: 35

## 2023-03-05 NOTE — DISCHARGE INSTRUCTIONS
All of your labs and imaging were normal.  Unfortunately this means that I do not have a reason you are experiencing abdominal pain.  However, you can be reassured that that the source of your pain is not life-threatening.  Please continue to use your prescribed medications to help control your symptoms.    Follow-up with your primary care doctor for recheck if your pain is not getting better over the next few days.    If you have vomiting such that you are unable to tolerate oral hydration, fever greater than 100.5  F, inability to urinate or produce a bowel movement, please return for evaluation.

## 2023-03-05 NOTE — ED TRIAGE NOTES
Pt brought in by EMS from home.   Pt has been having abdominal pain x4 days, N/V for 1 hour and loose stool x2 today.  Pt stomach distended.  Pt A&O x4.  Pt has taken Zofran x2 and Tramadol x1 already.       English

## 2023-03-05 NOTE — ED PROVIDER NOTES
EMERGENCY DEPARTMENT ENCOUNTER      NAME: Rukhsana Griffith  AGE: 62 year old female  YOB: 1960  MRN: [id number]  EVALUATION DATE & TIME: 3/4/2023  9:43 PM    PCP: Kei De Leon    ED PROVIDER: Elsie Navarro M.D.      Chief Complaint   Patient presents with     Abdominal Pain         FINAL IMPRESSION:  1. Chronic abdominal pain        MEDICAL DECISION MAKIN:59 PM I met with the patient, obtained history, performed an initial exam, and discussed options and plan for diagnostics and treatment here in the ED.   3:16 AM Reassessed and updated patient on her results. I discussed the plan for discharge with the patient, and patient is agreeable. We discussed supportive cares at home and reasons for return to the ER including new or worsening symptoms - all questions and concerns addressed. Patient to be discharged by RN.    Pertinent Labs & Imaging studies reviewed. (See chart for details)     Rukhsana Griffith is a 62 year old female who presents with upper abdominal pain.  She has a gastric stimulator for gastric emptying.  She also has had multiple surgeries.  Her abdomen is distended and tympanic and diffusely tender to palpation.  It is hard to assess whether this is baseline for her or not as she also has abdominal obesity.  I am concerned enough however, that I need to consider ischemic bowel, bowel obstruction, abdominal infection such as perforation, cholecystitis or appendicitis.  Fortunately her vital signs are reassuring.  I will get labs and imaging and give her medications to help manage her symptoms for both pain and nausea.    Fortunately, ultrasound, CT and labs are not suggestive of a serious pathology.  She has a slight elevation in her white blood cell count.  This would be most suggestive of a gastritis or viral gastroenteritis given the normalcy of the remainder of her labs and imaging.  On reassessment, she feels that she is able to pass gas and has not had no  further episodes of vomiting.  She is resting comfortably without further acute pain complaints.    At the time of discharge, she is abdominal pain-free.  She is comfortable with going home.  We discussed warning signs and indications to return to the emergency department.  She understands these and all discharge instructions.      History:    Supplemental history from: Documented in chart, if applicable    External Record(s) reviewed: Outpatient Record: Office Visit 3/3/23 and Prior Labs: alk phos    Work Up:    Chart documentation includes differential considered and any EKGs or imaging independently interpreted by provider, where specified.    In additional to work up documented, I considered the following work up: Documented in chart, if applicable.    External consultation:    Discussion of management with another provider: Documented in chart, if applicable    Complicating factors:    Care impacted by chronic illness: Chronic Lung Disease, Chronic Pain, Diabetes, Hyperlipidemia and Smoking / Nicotine Use    Care affected by social determinants of health: Alcohol Abuse and/or Recreational Drug Use, Low Income and Other: mental health    Disposition considerations: Discharge. I recommended the patient continue their current prescription strength medication(s): mental health meds, diabetic medications, and CV medications. I considered admission, but discharged patient after significant clinical improvement.        MEDICATIONS GIVEN IN THE EMERGENCY:  Medications   sodium chloride 0.9% infusion (has no administration in time range)   0.9% sodium chloride BOLUS (0 mLs Intravenous Stopped 3/5/23 0101)   diphenhydrAMINE (BENADRYL) injection 25 mg (25 mg Intravenous $Given 3/5/23 0020)   morphine (PF) injection 6 mg (6 mg Intramuscular Not Given 3/5/23 0101)   iopamidol (ISOVUE-370) solution 100 mL (100 mLs Intravenous $Given 3/5/23 0031)          =================================================================    HPI    Patient information was obtained from: Patient     Use of : Use of : N/A       Rukhsana Griffith is a 62 year old female with a history of DM2, HLD, COPD, seizures, methamphetamine abuse, schizoaffective disorder, who presents with abdominal pain. Patient presents with abdominal pain for the past 4 days that worsened 5 hours ago. Pain is sharp, stabbing, and 10/10 in intensity. She has taken tramadol at home without relief for pain. She reports associated chills, nausea, vomiting, diarrhea, and abdominal distention. Last normal bowel movement was this morning. She reports having abdominal surgery for digestive stimulator. Denies history of SBO. Patient recently tested negative for COVID on Friday (1 day ago). Patient denies urinary symptoms, fevers, cough, sore throat, or any additional complaints at this time.     REVIEW OF SYSTEMS   Review of Systems   Constitutional: Positive for chills. Negative for fever.   HENT: Negative for sore throat.    Respiratory: Negative for cough.    Gastrointestinal: Positive for abdominal distention, abdominal pain, diarrhea, nausea and vomiting.   Genitourinary: Negative for difficulty urinating, dysuria, frequency and hematuria.   All other systems reviewed and are negative.       PAST MEDICAL HISTORY:  Past Medical History:   Diagnosis Date     COPD (chronic obstructive pulmonary disease) (H)      Diabetes (H)      HLD (hyperlipidemia)      Methamphetamine abuse (H)      Schizoaffective disorder (H)      Seizures (H)        PAST SURGICAL HISTORY:  Past Surgical History:   Procedure Laterality Date     HYSTERECTOMY       KNEE SURGERY       OTHER SURGICAL HISTORY      gastric pacemaker     OTHER SURGICAL HISTORY      interstim placement     SD REVISE/REMOVE PERIPH/GASTRIC NEUROSTIM/ N/A 5/15/2019    Procedure: PARTIAL REMOVAL OLD LEAD;  Surgeon: Jcarlos Muñiz MD;   Location: [facility];  Service: Urology     RELEASE CARPAL TUNNEL         CURRENT MEDICATIONS:      Current Facility-Administered Medications:      sodium chloride 0.9% infusion, 1,000 mL, Intravenous, Continuous, Elsie Navarro MD    Current Outpatient Medications:      acetaminophen (TYLENOL) 160 mg/5 mL (5 mL) Soln solution, [ACETAMINOPHEN (TYLENOL) 160 MG/5 ML (5 ML) SOLN SOLUTION] Take 10 mL by mouth every 4 (four) hours as needed (Pain).       , Disp: , Rfl:      albuterol (PROAIR HFA;PROVENTIL HFA;VENTOLIN HFA) 90 mcg/actuation inhaler, [ALBUTEROL (PROAIR HFA;PROVENTIL HFA;VENTOLIN HFA) 90 MCG/ACTUATION INHALER] Inhale 2 puffs every 6 (six) hours as needed for wheezing., Disp: , Rfl:      albuterol (PROVENTIL) 2.5 mg /3 mL (0.083 %) nebulizer solution, [ALBUTEROL (PROVENTIL) 2.5 MG /3 ML (0.083 %) NEBULIZER SOLUTION] Take 2.5 mg by nebulization every 4 (four) hours as needed for wheezing.       , Disp: , Rfl:      aluminum-magnesium hydroxide-simethicone (MAALOX MAX STRENGTH) 400-400-40 mg/5 mL suspension, [ALUMINUM-MAGNESIUM HYDROXIDE-SIMETHICONE (MAALOX MAX STRENGTH) 400-400-40 MG/5 ML SUSPENSION] Take 5 mL by mouth every 4 (four) hours as needed for indigestion.       , Disp: , Rfl:      ammonium lactate (AMLACTIN) 12 % cream, [AMMONIUM LACTATE (AMLACTIN) 12 % CREAM] Apply 1 application topically 2 (two) times a day.       , Disp: , Rfl:      atropine 1 % ophthalmic solution, [ATROPINE 1 % OPHTHALMIC SOLUTION] Place 1-2 drops under the tongue 2 (two) times a day. Takes under tongue to stop drooling      , Disp: , Rfl:      budesonide-formoterol (SYMBICORT) 160-4.5 mcg/actuation inhaler, [BUDESONIDE-FORMOTEROL (SYMBICORT) 160-4.5 MCG/ACTUATION INHALER] Inhale 2 puffs 2 (two) times a day.       , Disp: , Rfl:      cholecalciferol, vitamin D3, 2,000 unit Tab, [CHOLECALCIFEROL, VITAMIN D3, 2,000 UNIT TAB] Take 2,000 Units by mouth daily., Disp: , Rfl:      cloZAPine (CLOZARIL) 100 MG tablet,  [CLOZAPINE (CLOZARIL) 100 MG TABLET] Take 200 mg by mouth at bedtime.       , Disp: , Rfl:      diphenhydrAMINE (BENADRYL) 25 mg capsule, [DIPHENHYDRAMINE (BENADRYL) 25 MG CAPSULE] Take 25 mg by mouth at bedtime as needed for itching., Disp: , Rfl:      docusate sodium (COLACE) 100 MG capsule, [DOCUSATE SODIUM (COLACE) 100 MG CAPSULE] Take 100 mg by mouth 2 (two) times a day.       , Disp: , Rfl:      EPINEPHrine (EPIPEN) 0.3 mg/0.3 mL atIn, [EPINEPHRINE (EPIPEN) 0.3 MG/0.3 ML ATIN] Inject 0.3 mg into the shoulder, thigh, or buttocks as needed., Disp: , Rfl:      escitalopram oxalate (LEXAPRO) 10 MG tablet, [ESCITALOPRAM OXALATE (LEXAPRO) 10 MG TABLET] Take 10 mg by mouth daily.       , Disp: , Rfl:      famotidine (PEPCID) 40 MG tablet, [FAMOTIDINE (PEPCID) 40 MG TABLET] Take 40 mg by mouth 2 (two) times a day., Disp: , Rfl:      fluoride, sodium, (DENTAGEL) 1.1 % Gel dental gel, [FLUORIDE, SODIUM, (DENTAGEL) 1.1 % GEL DENTAL GEL] Apply 1 application to teeth at bedtime., Disp: , Rfl:      glucagon, human recombinant, (GLUCAGEN HYPOKIT) 1 mg injection, [GLUCAGON, HUMAN RECOMBINANT, (GLUCAGEN HYPOKIT) 1 MG INJECTION] Infuse 1 mg into a venous catheter once as needed (Low blood sugars).       , Disp: , Rfl:      insulin aspart U-100 (NOVOLOG) 100 unit/mL injection, [INSULIN ASPART U-100 (NOVOLOG) 100 UNIT/ML INJECTION] Inject 15 Units under the skin 3 (three) times a day before meals. Sliding scale       , Disp: , Rfl:      insulin glargine (LANTUS) 100 unit/mL injection, [INSULIN GLARGINE (LANTUS) 100 UNIT/ML INJECTION] Inject 25 Units under the skin every morning.       , Disp: , Rfl:      ipratropium-albuterol (DUO-NEB) 0.5-2.5 mg/3 mL nebulizer, [IPRATROPIUM-ALBUTEROL (DUO-NEB) 0.5-2.5 MG/3 ML NEBULIZER] Take 3 mL by nebulization 4 (four) times a day as needed.       , Disp: , Rfl:      lisinopril (PRINIVIL,ZESTRIL) 10 MG tablet, [LISINOPRIL (PRINIVIL,ZESTRIL) 10 MG TABLET] Take 10 mg by mouth daily., Disp: ,  Rfl:      loratadine (CLARITIN) 10 mg tablet, [LORATADINE (CLARITIN) 10 MG TABLET] Take 10 mg by mouth daily., Disp: , Rfl:      lubiprostone (AMITIZA) 24 MCG capsule, [LUBIPROSTONE (AMITIZA) 24 MCG CAPSULE] Take 24 mcg by mouth 2 (two) times a day., Disp: , Rfl:      magnesium hydroxide (MILK OF MAG) 400 mg/5 mL Susp suspension, [MAGNESIUM HYDROXIDE (MILK OF MAG) 400 MG/5 ML SUSP SUSPENSION] Take 30 mL by mouth 2 (two) times a day as needed.       , Disp: , Rfl:      mometasone (NASONEX) 50 mcg/actuation nasal spray, [MOMETASONE (NASONEX) 50 MCG/ACTUATION NASAL SPRAY] 2 sprays into each nostril daily., Disp: , Rfl:      multivitamin with minerals (THERA-M) 9 mg iron-400 mcg Tab tablet, [MULTIVITAMIN WITH MINERALS (THERA-M) 9 MG IRON-400 MCG TAB TABLET] Take 1 tablet by mouth daily., Disp: , Rfl:      nicotine (NICODERM CQ) 21 mg/24 hr, [NICOTINE (NICODERM CQ) 21 MG/24 HR] Place 1 patch on the skin daily as needed for smoking cessation.       , Disp: , Rfl:      oxyCODONE-acetaminophen (PERCOCET/ENDOCET) 5-325 mg per tablet, [OXYCODONE-ACETAMINOPHEN (PERCOCET/ENDOCET) 5-325 MG PER TABLET] Take 1 tablet by mouth every 6 (six) hours as needed for pain., Disp: 12 tablet, Rfl: 0     polyethylene glycol (MIRALAX) 17 gram packet, [POLYETHYLENE GLYCOL (MIRALAX) 17 GRAM PACKET] Take 17 g by mouth 2 (two) times a day.       , Disp: , Rfl:      pravastatin (PRAVACHOL) 40 MG tablet, [PRAVASTATIN (PRAVACHOL) 40 MG TABLET] Take 40 mg by mouth every morning.       , Disp: , Rfl:      ramelteon (ROZEREM) 8 mg tablet, [RAMELTEON (ROZEREM) 8 MG TABLET] Take 8 mg by mouth at bedtime., Disp: , Rfl:      SUMAtriptan (IMITREX) 100 MG tablet, [SUMATRIPTAN (IMITREX) 100 MG TABLET] Take 100 mg by mouth every 2 (two) hours as needed for migraine., Disp: , Rfl:      topiramate (TOPAMAX) 200 MG tablet, [TOPIRAMATE (TOPAMAX) 200 MG TABLET] Take 200 mg by mouth 2 (two) times a day., Disp: , Rfl:      triamcinolone (KENALOG) 0.1 % ointment,  "[TRIAMCINOLONE (KENALOG) 0.1 % OINTMENT] Apply 1 application topically 2 (two) times a day., Disp: , Rfl:      trimethobenzamide (TIGAN) 300 mg capsule, [TRIMETHOBENZAMIDE (TIGAN) 300 MG CAPSULE] Take 300 mg by mouth 3 (three) times a day as needed (N/V)., Disp: , Rfl:     ALLERGIES:  Allergies   Allergen Reactions     Aspirin Unknown     Bee Sting Kit [Bee Venom] Anaphylaxis     Botox [Onabotulinumtoxina] Hives and Shortness Of Breath     Hymenoptera Allergenic Extract [Wasp Venom Protein] Anaphylaxis     Latex Shortness Of Breath     Sucralose Anaphylaxis     Codeine Other (See Comments)     Migraines      Diatrizoate Meglumine [Diatrizoate] Hives and Swelling     Dilaudid [Hydromorphone] Other (See Comments)     Seizures     Garlic Hives     Green Pepper [Capsicum] Hives     Hydrocodone Hives     Hydroxyzine Hives and Other (See Comments)     Dyspnea     Iodinated Contrast Media [Diagnostic X-Ray Materials] Hives and Other (See Comments)     EDEMA     Latex Unknown     Added based on information entered during log entry, please review and add reactions, type, and severity as needed     Lidocaine Hives     Metformin Other (See Comments)     \"Stomach bleeding and kidney infection\"     Onion Hives     Procaine Hives     Shellfish Containing Products [Shellfish-Derived Products] Unknown     Sulfa (Sulfonamide Antibiotics) [Sulfa Drugs] Unknown     Adhesive Tape-Silicones [Adhesive Tape] Rash     Cephalosporins Rash     Chlorpromazine Rash     THORAZINE     Chlorpropamide Rash     Coconut Oil Rash     Darvocet A500 [Propoxyphene N-Apap] Rash     Demerol [Meperidine] Rash     Diazepam Rash     Dipyridamole Rash     Fentanyl Rash     Fluoxetine Rash     Furosemide Rash     Glyburide Rash and Other (See Comments)     Hypoglycemia     Haloperidol Rash     Lithium Rash     Nuts - Unspecified [Nuts] Rash     Almonds specifically      Ondansetron Rash     Oxycontin [Oxycodone] Rash     Penicillins Rash and Other (See " "Comments)     Migraines, dizzy and sweating     Thallium-201 [Thallous Chloride Tl 201] Rash     Thioridazine Rash     Tizanidine Rash     Toradol [Ketorolac] Rash       FAMILY HISTORY:  Family History   Adopted: Yes       SOCIAL HISTORY:   Social History     Tobacco Use     Smoking status: Every Day     Packs/day: 0.25     Types: Cigarettes     Smokeless tobacco: Never   Substance Use Topics     Alcohol use: Not Currently     Drug use: Not Currently        PHYSICAL EXAM:    Vitals: /57   Pulse 82   Temp 98.1  F (36.7  C) (Oral)   Ht 1.499 m (4' 11\")   Wt 71.7 kg (158 lb)   SpO2 95%   BMI 31.91 kg/m     General:. Alert and interactive, uncomfortable appearing.  HENT: Oropharynx without erythema or exudates. MMM.  TMs clear bilaterally.  Eyes: Pupils mid-sized and equally reactive.   Neck: Full AROM.  No midline tenderness to palpation.  Cardiovascular: Regular rate and rhythm. Peripheral pulses 2+ bilaterally.  Chest/Pulmonary: Normal work of breathing. Lung sounds clear and equal throughout, no wheezes or crackles. No chest wall tenderness or deformities.  Abdomen: Diffuse tenderness with guarding in bilateral upper quadrants. Distended. Midline surgical scar is well-healed. Palpable abdominal stimulator. No other palpable masses.   Back/Spine: No CVA or midline tenderness.  Extremities: Normal ROM of all major joints. No lower extremity edema.   Skin: Warm and dry. Normal skin color.   Neuro: Speech clear. CNs grossly intact. Moves all extremities appropriately. Strength and sensation grossly intact to all extremities.   Psych: Normal affect/mood, cooperative, memory appropriate.     LAB:  All pertinent labs reviewed and interpreted.  Labs Ordered and Resulted from Time of ED Arrival to Time of ED Departure   COMPREHENSIVE METABOLIC PANEL - Abnormal       Result Value    Sodium 131 (*)     Potassium 3.6      Chloride 99      Carbon Dioxide (CO2) 24      Anion Gap 8      Urea Nitrogen 10.8      " Creatinine 0.57      Calcium 9.9      Glucose 110 (*)     Alkaline Phosphatase 114 (*)     AST 23      ALT 17      Protein Total 6.6      Albumin 3.4 (*)     Bilirubin Total <0.2      GFR Estimate >90     CBC WITH PLATELETS AND DIFFERENTIAL - Abnormal    WBC Count 14.9 (*)     RBC Count 4.43      Hemoglobin 13.9      Hematocrit 41.5      MCV 94      MCH 31.4      MCHC 33.5      RDW 14.2      Platelet Count 205      % Neutrophils 57      % Lymphocytes 27      % Monocytes 10      % Eosinophils 3      % Basophils 1      % Immature Granulocytes 2      NRBCs per 100 WBC 0      Absolute Neutrophils 8.9 (*)     Absolute Lymphocytes 4.0      Absolute Monocytes 1.5 (*)     Absolute Eosinophils 0.4      Absolute Basophils 0.1      Absolute Immature Granulocytes 0.2      Absolute NRBCs 0.0     ROUTINE UA WITH MICROSCOPIC REFLEX TO CULTURE - Abnormal    Color Urine Colorless      Appearance Urine Clear      Glucose Urine Negative      Bilirubin Urine Negative      Ketones Urine Negative      Specific Gravity Urine 1.005      Blood Urine Negative      pH Urine 7.0      Protein Albumin Urine Negative      Urobilinogen Urine <2.0      Nitrite Urine Negative      Leukocyte Esterase Urine Negative      Bacteria Urine Few (*)     RBC Urine 0      WBC Urine 1      Squamous Epithelials Urine <1     LACTIC ACID WHOLE BLOOD - Normal    Lactic Acid 0.7         RADIOLOGY:  Abdomen US, limited (RUQ only)   Final Result   IMPRESSION:   1.  Mild diffuse hepatic steatosis.   2.  Postcholecystectomy. No biliary dilatation. Otherwise unremarkable abdominal ultrasound.            CT Abdomen Pelvis w Contrast   Final Result   IMPRESSION:    1.  No acute CT abnormality of the abdomen/pelvis.   2.  Nonacute findings, as detailed above.                    I, Jose D Juarez, am serving as a scribe to document services personally performed by Dr. Elsie Navarro  based on my observation and the provider's statements to me. I, Elsie Navarro MD attest  that Jose D Juarez is acting in a scribe capacity, has observed my performance of the services and has documented them in accordance with my direction.      Elsie Navarro M.D.  Emergency Medicine  [facility] EMERGENCY DEPARTMENT  [address] [place] [id number]  [phone]  Dept: [phone]         Elsie Navarro MD  03/05/23 0415

## 2023-04-12 ENCOUNTER — HOSPITAL ENCOUNTER (EMERGENCY)
Facility: HOSPITAL | Age: 63
Discharge: HOME | End: 2023-04-13
Attending: EMERGENCY MEDICINE | Admitting: EMERGENCY MEDICINE
Payer: MEDICAID

## 2023-04-12 DIAGNOSIS — G44.209 TENSION HEADACHE: ICD-10-CM

## 2023-04-12 DIAGNOSIS — R07.89 ATYPICAL CHEST PAIN: ICD-10-CM

## 2023-04-12 DIAGNOSIS — R10.84 ABDOMINAL PAIN, GENERALIZED: ICD-10-CM

## 2023-04-12 LAB
ALBUMIN SERPL BCG-MCNC: 3.7 G/DL (ref 3.5–5.2)
ALP SERPL-CCNC: 129 U/L (ref 35–104)
ALT SERPL W P-5'-P-CCNC: 24 U/L (ref 10–35)
ANION GAP SERPL CALCULATED.3IONS-SCNC: 9 MMOL/L (ref 7–15)
AST SERPL W P-5'-P-CCNC: 21 U/L (ref 10–35)
BILIRUB SERPL-MCNC: <0.2 MG/DL
BUN SERPL-MCNC: 10.6 MG/DL (ref 8–23)
CALCIUM SERPL-MCNC: 9.7 MG/DL (ref 8.8–10.2)
CHLORIDE SERPL-SCNC: 98 MMOL/L (ref 98–107)
CREAT SERPL-MCNC: 0.53 MG/DL (ref 0.51–0.95)
DEPRECATED HCO3 PLAS-SCNC: 22 MMOL/L (ref 22–29)
ERYTHROCYTE [DISTWIDTH] IN BLOOD BY AUTOMATED COUNT: 13.6 % (ref 10–15)
GFR SERPL CREATININE-BSD FRML MDRD: >90 ML/MIN/1.73M2
GLUCOSE SERPL-MCNC: 281 MG/DL (ref 70–99)
HCT VFR BLD AUTO: 40.5 % (ref 35–47)
HGB BLD-MCNC: 13.8 G/DL (ref 11.7–15.7)
LACTATE SERPL-SCNC: 1.4 MMOL/L (ref 0.7–2)
LIPASE SERPL-CCNC: 13 U/L (ref 13–60)
MCH RBC QN AUTO: 32.2 PG (ref 26.5–33)
MCHC RBC AUTO-ENTMCNC: 34.1 G/DL (ref 31.5–36.5)
MCV RBC AUTO: 95 FL (ref 78–100)
PLATELET # BLD AUTO: 205 10E3/UL (ref 150–450)
POTASSIUM SERPL-SCNC: 3.3 MMOL/L (ref 3.4–5.3)
PROT SERPL-MCNC: 6.7 G/DL (ref 6.4–8.3)
RBC # BLD AUTO: 4.28 10E6/UL (ref 3.8–5.2)
SODIUM SERPL-SCNC: 129 MMOL/L (ref 136–145)
TROPONIN T SERPL HS-MCNC: 9 NG/L
WBC # BLD AUTO: 17.5 10E3/UL (ref 4–11)

## 2023-04-12 PROCEDURE — 83690 ASSAY OF LIPASE: CPT | Performed by: EMERGENCY MEDICINE

## 2023-04-12 PROCEDURE — 80053 COMPREHEN METABOLIC PANEL: CPT | Performed by: EMERGENCY MEDICINE

## 2023-04-12 PROCEDURE — 99285 EMERGENCY DEPT VISIT HI MDM: CPT | Mod: 25

## 2023-04-12 PROCEDURE — 96374 THER/PROPH/DIAG INJ IV PUSH: CPT

## 2023-04-12 PROCEDURE — 85014 HEMATOCRIT: CPT | Performed by: EMERGENCY MEDICINE

## 2023-04-12 PROCEDURE — 93005 ELECTROCARDIOGRAM TRACING: CPT | Performed by: STUDENT IN AN ORGANIZED HEALTH CARE EDUCATION/TRAINING PROGRAM

## 2023-04-12 PROCEDURE — 96361 HYDRATE IV INFUSION ADD-ON: CPT

## 2023-04-12 PROCEDURE — 83605 ASSAY OF LACTIC ACID: CPT | Performed by: EMERGENCY MEDICINE

## 2023-04-12 PROCEDURE — 36415 COLL VENOUS BLD VENIPUNCTURE: CPT | Performed by: STUDENT IN AN ORGANIZED HEALTH CARE EDUCATION/TRAINING PROGRAM

## 2023-04-12 PROCEDURE — 250N000011 HC RX IP 250 OP 636: Performed by: EMERGENCY MEDICINE

## 2023-04-12 PROCEDURE — 96375 TX/PRO/DX INJ NEW DRUG ADDON: CPT

## 2023-04-12 PROCEDURE — 36415 COLL VENOUS BLD VENIPUNCTURE: CPT | Performed by: EMERGENCY MEDICINE

## 2023-04-12 PROCEDURE — 84484 ASSAY OF TROPONIN QUANT: CPT | Performed by: EMERGENCY MEDICINE

## 2023-04-12 RX ORDER — METOCLOPRAMIDE HYDROCHLORIDE 5 MG/ML
10 INJECTION INTRAMUSCULAR; INTRAVENOUS ONCE
Status: COMPLETED | OUTPATIENT
Start: 2023-04-12 | End: 2023-04-12

## 2023-04-12 RX ORDER — DIPHENHYDRAMINE HYDROCHLORIDE 50 MG/ML
25 INJECTION INTRAMUSCULAR; INTRAVENOUS ONCE
Status: COMPLETED | OUTPATIENT
Start: 2023-04-12 | End: 2023-04-12

## 2023-04-12 RX ADMIN — DIPHENHYDRAMINE HYDROCHLORIDE 25 MG: 50 INJECTION, SOLUTION INTRAMUSCULAR; INTRAVENOUS at 23:03

## 2023-04-12 RX ADMIN — METOCLOPRAMIDE 10 MG: 5 INJECTION, SOLUTION INTRAMUSCULAR; INTRAVENOUS at 23:02

## 2023-04-12 ASSESSMENT — ACTIVITIES OF DAILY LIVING (ADL): ADLS_ACUITY_SCORE: 35

## 2023-04-13 ENCOUNTER — APPOINTMENT (OUTPATIENT)
Dept: CT IMAGING | Facility: HOSPITAL | Age: 63
End: 2023-04-13
Attending: EMERGENCY MEDICINE
Payer: MEDICAID

## 2023-04-13 VITALS
TEMPERATURE: 98.1 F | HEART RATE: 94 BPM | DIASTOLIC BLOOD PRESSURE: 55 MMHG | SYSTOLIC BLOOD PRESSURE: 141 MMHG | RESPIRATION RATE: 33 BRPM | OXYGEN SATURATION: 91 %

## 2023-04-13 PROBLEM — R07.89 ATYPICAL CHEST PAIN: Status: ACTIVE | Noted: 2023-04-13

## 2023-04-13 PROBLEM — R10.84 ABDOMINAL PAIN, GENERALIZED: Status: ACTIVE | Noted: 2018-02-04

## 2023-04-13 PROBLEM — G44.209 TENSION HEADACHE: Status: ACTIVE | Noted: 2023-04-13

## 2023-04-13 LAB
HOLD SPECIMEN: NORMAL

## 2023-04-13 PROCEDURE — 74176 CT ABD & PELVIS W/O CONTRAST: CPT

## 2023-04-13 PROCEDURE — 258N000003 HC RX IP 258 OP 636: Performed by: EMERGENCY MEDICINE

## 2023-04-13 RX ADMIN — SODIUM CHLORIDE 1000 ML: 9 INJECTION, SOLUTION INTRAVENOUS at 01:25

## 2023-04-13 ASSESSMENT — ACTIVITIES OF DAILY LIVING (ADL): ADLS_ACUITY_SCORE: 35

## 2023-04-13 NOTE — ED NOTES
Bed: [facility]-03  Expected date:   Expected time:   Means of arrival:   Comments:  Tesfaye  63 yo F chest pain/arm weakness/AMS

## 2023-04-13 NOTE — ED NOTES
Assumed care at this time.   2330: Pt appears to be sleeping at this time. Connected to monitor. NAD noted. Will allow to rest at this time, pending further orders.   0032: Pt continues to rest. NAD. Connected to monitor. Pending further orders.   0132: Pt to CT at this time.

## 2023-04-13 NOTE — ED TRIAGE NOTES
"EMS:  Neighbor called for patient c/o chest pain.  Picked up by same crew yesterday and brought to [place] for same symptoms - chest pain, R hand weakness, slurred speech.  Per patient she was dx with seizure & stroke and discharged this AM.  Declined nitroglycerin because dizzy.  Reports ALL to ASA.  [place] better with 4 LPM O2.   Would slowly become unconscious and \"flop,\" be unresponsive with good VS.  Then becomes conscious again.  Alert x1.  Reports migraine 9 days, hit head today/yesterday...      Currently patient c/o nausea, chest pain, head pain, fatigue.  Has periods of unresponsiveness.  Tolerating room air.     Triage Assessment     Row Name 04/12/23 4147       Triage Assessment (Adult)    Airway WDL WDL       Respiratory WDL    Respiratory WDL X;rhythm/pattern    Rhythm/Pattern, Respiratory dyspnea on exertion;shortness of breath       Skin Circulation/Temperature WDL    Skin Circulation/Temperature WDL X;circulation    Skin Circulation --  [place]       Cardiac WDL    Cardiac WDL X;chest pain       Chest Pain Assessment    Chest Pain Location other (see comments)  Generalized    Character sharp    Duration Constant       Cognitive/Neuro/Behavioral WDL    Cognitive/Neuro/Behavioral WDL X;mood/behavior       Pupils (CN II)    Pupil PERRLA yes    Pupil Size Left 2 mm    Pupil Size Right 2 mm              "

## 2023-04-13 NOTE — ED PROVIDER NOTES
EMERGENCY DEPARTMENT ENCOUNTER      NAME: Rukhsana Griffith  AGE: 62 year old female  YOB: 1960  MRN: [id number]  EVALUATION DATE & TIME: 4/12/2023 10:35 PM    PCP: Kei De Leon    ED PROVIDER: Aaron Moreau M.D.      Chief Complaint   Patient presents with     Chest Pain         FINAL IMPRESSION:  Headache  Vomiting  Abdominal pain  Atypical chest pain    ED COURSE & MEDICAL DECISION MAKING:    Pertinent Labs & Imaging studies reviewed. (See chart for details)  62 year old female presents to the Emergency Department for evaluation of chest pain, headache.  Patient reports she has had headache for the last 9 days.  With this she has been vomiting for multiple days also.  However review of records indicate patient was seen yesterday at outlying facility for confusion and evaluated for potential stroke.  Patient thought to have had a seizure and postictal.  Patient made no reports of vomiting at that time.  Patient reports her headache is typically easy to deal with with her medications but she reports taking multiple medications without improvement.  On exam she is an obese female in mild distress.  Vital signs unremarkable.  Neurologically intact.  Lungs clear.  Card exam unremarkable.  Abdomen obese with right lower quadrant tenderness which patient had not appreciated prior to examination.  We will proceed with baseline blood work.  Patient treated symptomatically with intravenous Reglan and Benadryl for her headache.  Patient appears non toxic with stable vitals signs. Overall exam is benign.      10:50 PM I met with the patient for the initial interview and physical examination. Discussed plan for treatment and workup in the ED.    12:55 AM.  Actiq acid normal at 1.4.  Lipase normal at 13.  Mild hyponatremia with sodium 129 and potassium low at 3.3.  Glucose mildly elevated.  Corrected sodium essentially normal.  Alkaline phosphatase mildly elevated but remainder of transaminases and  total bilirubin normal.  White cell count elevated 17.3.  No indications for CT imaging regarding her headache.  Patient had CT of the head yesterday which was normal.  Given her abdominal tenderness and leukocytosis we will proceed with CT imaging the abdomen.  Troponin normal.  At 2:29 AM.  CT imaging the abdomen is unremarkable.  No processes to explain her symptomatology.  Patient had been quite somnolent after receiving Benadryl.  Now awake and appropriate wishing to be discharged.  No contraindications to dismissal at this point.  We will continue outpatient management.  At the conclusion of the encounter I discussed the results of all of the tests and the disposition. The questions were answered and return precautions provided. The patient or family acknowledged understanding and was agreeable with the care plan.     Medical Decision Making    History:    Supplemental history from: N/A    External Record(s) reviewed: Documented in chart, if applicable.    Work Up:    Chart documentation includes differential considered and any EKGs or imaging independently interpreted by provider, where specified.    In additional to work up documented, I considered the following work up: Documented in chart, if applicable.    External consultation:    Discussion of management with another provider: Documented in chart, if applicable    Complicating factors:    Care impacted by chronic illness: Chronic Lung Disease, Diabetes and Hypertension    Care affected by social determinants of health: N/A    Disposition considerations: Discharge. No recommendations on prescription strength medication(s). I considered admission, but discharged patient after significant clinical improvement.      PPE: Provider wore gloves, N95 mask, eye protection.     MEDICATIONS GIVEN IN THE EMERGENCY:  Medications   metoclopramide (REGLAN) injection 10 mg (has no administration in time range)   diphenhydrAMINE (BENADRYL) injection 25 mg (has no  administration in time range)       NEW PRESCRIPTIONS STARTED AT TODAY'S ER VISIT  New Prescriptions    No medications on file          =================================================================    HPI    Patient information was obtained from: Patient    Use of Intrepreter: N/A      Rukhsana Griffith is a 62 year old female with a pertient medical history of HTN, seizure disorder, diabetes mellitus type II, and COPD who presents to the ED for evaluation of chest pain.    Per chart review, the patient presented to [place] [facility] on 4/11/23 after a fall. EMS reported right sided weakness with confusion. On exam, the patient was lethargic but able to weakly move her extremities and eyes to commands, appeared postictal. Patient quickly returned to baseline mental status. CT showed no evidence of acute stroke or head bleed. After shared medical decision making patient wanted to go home and follow up as an outpatient.    The patient reports she had onset of left sided chest pain around 2115 this evening. The chest pain is sharp in nature and has been constant since onset. She endorses associated shortness of breath and nausea. She denies any history of similar pain.    She also reports she has had a couple days of vomiting and 8 days of a migraine headache. She did not have any vomiting when she was at [place] [facility] ED yesterday because she was taking compazine at the time. She has been taking Imitrex for her headache without any relief. She reports a history of migraines, but states they come very rarely and a prescription for migraine medication lasts her for several years.    REVIEW OF SYSTEMS   Constitutional:  Denies fever, chills  Respiratory:  Denies productive cough. Endorses shortness of breath.  Cardiovascular:  Denies palpitations. Endorses chest pain.  GI:  Denies abdominal pain or change in bowel or bladder habits. Endorses nausea, vomiting.  Musculoskeletal:  Denies any new muscle/joint  swelling  Skin:  Denies rash   Neurologic:  Denies focal weakness. Endorses headache.  All systems negative except as marked.     PAST MEDICAL HISTORY:  Past Medical History:   Diagnosis Date     COPD (chronic obstructive pulmonary disease) (H)      Diabetes (H)      HLD (hyperlipidemia)      Methamphetamine abuse (H)      Schizoaffective disorder (H)      Seizures (H)        PAST SURGICAL HISTORY:  Past Surgical History:   Procedure Laterality Date     HYSTERECTOMY       KNEE SURGERY       OTHER SURGICAL HISTORY      gastric pacemaker     OTHER SURGICAL HISTORY      interstim placement     [place] REVISE/REMOVE PERIPH/GASTRIC NEUROSTIM/ N/A 5/15/2019    Procedure: PARTIAL REMOVAL OLD LEAD;  Surgeon: Jcarlos Muñiz MD;  Location: [facility];  Service: Urology     RELEASE CARPAL TUNNEL           CURRENT MEDICATIONS:      Current Facility-Administered Medications:      diphenhydrAMINE (BENADRYL) injection 25 mg, 25 mg, Intravenous, Once, Aaron Moreau MD     metoclopramide (REGLAN) injection 10 mg, 10 mg, Intravenous, Once, Aaron Moreau MD    Current Outpatient Medications:      acetaminophen (TYLENOL) 160 mg/5 mL (5 mL) Soln solution, [ACETAMINOPHEN (TYLENOL) 160 MG/5 ML (5 ML) SOLN SOLUTION] Take 10 mL by mouth every 4 (four) hours as needed (Pain).       , Disp: , Rfl:      albuterol (PROAIR HFA;PROVENTIL HFA;VENTOLIN HFA) 90 mcg/actuation inhaler, [ALBUTEROL (PROAIR HFA;PROVENTIL HFA;VENTOLIN HFA) 90 MCG/ACTUATION INHALER] Inhale 2 puffs every 6 (six) hours as needed for wheezing., Disp: , Rfl:      albuterol (PROVENTIL) 2.5 mg /3 mL (0.083 %) nebulizer solution, [ALBUTEROL (PROVENTIL) 2.5 MG /3 ML (0.083 %) NEBULIZER SOLUTION] Take 2.5 mg by nebulization every 4 (four) hours as needed for wheezing.       , Disp: , Rfl:      aluminum-magnesium hydroxide-simethicone (MAALOX MAX STRENGTH) 400-400-40 mg/5 mL suspension, [ALUMINUM-MAGNESIUM HYDROXIDE-SIMETHICONE (MAALOX MAX STRENGTH) 400-400-40  MG/5 ML SUSPENSION] Take 5 mL by mouth every 4 (four) hours as needed for indigestion.       , Disp: , Rfl:      ammonium lactate (AMLACTIN) 12 % cream, [AMMONIUM LACTATE (AMLACTIN) 12 % CREAM] Apply 1 application topically 2 (two) times a day.       , Disp: , Rfl:      atropine 1 % ophthalmic solution, [ATROPINE 1 % OPHTHALMIC SOLUTION] Place 1-2 drops under the tongue 2 (two) times a day. Takes under tongue to stop drooling      , Disp: , Rfl:      budesonide-formoterol (SYMBICORT) 160-4.5 mcg/actuation inhaler, [BUDESONIDE-FORMOTEROL (SYMBICORT) 160-4.5 MCG/ACTUATION INHALER] Inhale 2 puffs 2 (two) times a day.       , Disp: , Rfl:      cholecalciferol, vitamin D3, 2,000 unit Tab, [CHOLECALCIFEROL, VITAMIN D3, 2,000 UNIT TAB] Take 2,000 Units by mouth daily., Disp: , Rfl:      cloZAPine (CLOZARIL) 100 MG tablet, [CLOZAPINE (CLOZARIL) 100 MG TABLET] Take 200 mg by mouth at bedtime.       , Disp: , Rfl:      diphenhydrAMINE (BENADRYL) 25 mg capsule, [DIPHENHYDRAMINE (BENADRYL) 25 MG CAPSULE] Take 25 mg by mouth at bedtime as needed for itching., Disp: , Rfl:      docusate sodium (COLACE) 100 MG capsule, [DOCUSATE SODIUM (COLACE) 100 MG CAPSULE] Take 100 mg by mouth 2 (two) times a day.       , Disp: , Rfl:      EPINEPHrine (EPIPEN) 0.3 mg/0.3 mL atIn, [EPINEPHRINE (EPIPEN) 0.3 MG/0.3 ML ATIN] Inject 0.3 mg into the shoulder, thigh, or buttocks as needed., Disp: , Rfl:      escitalopram oxalate (LEXAPRO) 10 MG tablet, [ESCITALOPRAM OXALATE (LEXAPRO) 10 MG TABLET] Take 10 mg by mouth daily.       , Disp: , Rfl:      famotidine (PEPCID) 40 MG tablet, [FAMOTIDINE (PEPCID) 40 MG TABLET] Take 40 mg by mouth 2 (two) times a day., Disp: , Rfl:      fluoride, sodium, (DENTAGEL) 1.1 % Gel dental gel, [FLUORIDE, SODIUM, (DENTAGEL) 1.1 % GEL DENTAL GEL] Apply 1 application to teeth at bedtime., Disp: , Rfl:      glucagon, human recombinant, (GLUCAGEN HYPOKIT) 1 mg injection, [GLUCAGON, HUMAN RECOMBINANT, (GLUCAGEN HYPOKIT) 1  Quality 130: Documentation Of Current Medications In The Medical Record: Current Medications Documented Detail Level: Detailed MG INJECTION] Infuse 1 mg into a venous catheter once as needed (Low blood sugars).       , Disp: , Rfl:      insulin aspart U-100 (NOVOLOG) 100 unit/mL injection, [INSULIN ASPART U-100 (NOVOLOG) 100 UNIT/ML INJECTION] Inject 15 Units under the skin 3 (three) times a day before meals. Sliding scale       , Disp: , Rfl:      insulin glargine (LANTUS) 100 unit/mL injection, [INSULIN GLARGINE (LANTUS) 100 UNIT/ML INJECTION] Inject 25 Units under the skin every morning.       , Disp: , Rfl:      ipratropium-albuterol (DUO-NEB) 0.5-2.5 mg/3 mL nebulizer, [IPRATROPIUM-ALBUTEROL (DUO-NEB) 0.5-2.5 MG/3 ML NEBULIZER] Take 3 mL by nebulization 4 (four) times a day as needed.       , Disp: , Rfl:      lisinopril (PRINIVIL,ZESTRIL) 10 MG tablet, [LISINOPRIL (PRINIVIL,ZESTRIL) 10 MG TABLET] Take 10 mg by mouth daily., Disp: , Rfl:      loratadine (CLARITIN) 10 mg tablet, [LORATADINE (CLARITIN) 10 MG TABLET] Take 10 mg by mouth daily., Disp: , Rfl:      lubiprostone (AMITIZA) 24 MCG capsule, [LUBIPROSTONE (AMITIZA) 24 MCG CAPSULE] Take 24 mcg by mouth 2 (two) times a day., Disp: , Rfl:      magnesium hydroxide (MILK OF MAG) 400 mg/5 mL Susp suspension, [MAGNESIUM HYDROXIDE (MILK OF MAG) 400 MG/5 ML SUSP SUSPENSION] Take 30 mL by mouth 2 (two) times a day as needed.       , Disp: , Rfl:      mometasone (NASONEX) 50 mcg/actuation nasal spray, [MOMETASONE (NASONEX) 50 MCG/ACTUATION NASAL SPRAY] 2 sprays into each nostril daily., Disp: , Rfl:      multivitamin with minerals (THERA-M) 9 mg iron-400 mcg Tab tablet, [MULTIVITAMIN WITH MINERALS (THERA-M) 9 MG IRON-400 MCG TAB TABLET] Take 1 tablet by mouth daily., Disp: , Rfl:      nicotine (NICODERM CQ) 21 mg/24 hr, [NICOTINE (NICODERM CQ) 21 MG/24 HR] Place 1 patch on the skin daily as needed for smoking cessation.       , Disp: , Rfl:      oxyCODONE-acetaminophen (PERCOCET/ENDOCET) 5-325 mg per tablet, [OXYCODONE-ACETAMINOPHEN (PERCOCET/ENDOCET) 5-325 MG PER TABLET] Take 1 tablet by  mouth every 6 (six) hours as needed for pain., Disp: 12 tablet, Rfl: 0     polyethylene glycol (MIRALAX) 17 gram packet, [POLYETHYLENE GLYCOL (MIRALAX) 17 GRAM PACKET] Take 17 g by mouth 2 (two) times a day.       , Disp: , Rfl:      pravastatin (PRAVACHOL) 40 MG tablet, [PRAVASTATIN (PRAVACHOL) 40 MG TABLET] Take 40 mg by mouth every morning.       , Disp: , Rfl:      ramelteon (ROZEREM) 8 mg tablet, [RAMELTEON (ROZEREM) 8 MG TABLET] Take 8 mg by mouth at bedtime., Disp: , Rfl:      SUMAtriptan (IMITREX) 100 MG tablet, [SUMATRIPTAN (IMITREX) 100 MG TABLET] Take 100 mg by mouth every 2 (two) hours as needed for migraine., Disp: , Rfl:      topiramate (TOPAMAX) 200 MG tablet, [TOPIRAMATE (TOPAMAX) 200 MG TABLET] Take 200 mg by mouth 2 (two) times a day., Disp: , Rfl:      triamcinolone (KENALOG) 0.1 % ointment, [TRIAMCINOLONE (KENALOG) 0.1 % OINTMENT] Apply 1 application topically 2 (two) times a day., Disp: , Rfl:      trimethobenzamide (TIGAN) 300 mg capsule, [TRIMETHOBENZAMIDE (TIGAN) 300 MG CAPSULE] Take 300 mg by mouth 3 (three) times a day as needed (N/V)., Disp: , Rfl:     ALLERGIES:  Allergies   Allergen Reactions     Aspirin Unknown     Bee Sting Kit [Bee Venom] Anaphylaxis     Botox [Onabotulinumtoxina] Hives and Shortness Of Breath     Hymenoptera Allergenic Extract [Wasp Venom Protein] Anaphylaxis     Latex Shortness Of Breath     Sucralose Anaphylaxis     Codeine Other (See Comments)     Migraines      Diatrizoate Meglumine [Diatrizoate] Hives and Swelling     Dilaudid [Hydromorphone] Other (See Comments)     Seizures     Garlic Hives     Green Pepper [Capsicum] Hives     Hydrocodone Hives     Hydroxyzine Hives and Other (See Comments)     Dyspnea     Iodinated Contrast Media [Diagnostic X-Ray Materials] Hives and Other (See Comments)     EDEMA     Latex Unknown     Added based on information entered during log entry, please review and add reactions, type, and severity as needed     Lidocaine Hives      "Metformin Other (See Comments)     \"Stomach bleeding and kidney infection\"     Onion Hives     Procaine Hives     Shellfish Containing Products [Shellfish-Derived Products] Unknown     Sulfa (Sulfonamide Antibiotics) [Sulfa Drugs] Unknown     Adhesive Tape-Silicones [Adhesive Tape] Rash     Cephalosporins Rash     Chlorpromazine Rash     THORAZINE     Chlorpropamide Rash     Coconut Oil Rash     Darvocet A500 [Propoxyphene N-Apap] Rash     Demerol [Meperidine] Rash     Diazepam Rash     Dipyridamole Rash     Fentanyl Rash     Fluoxetine Rash     Furosemide Rash     Glyburide Rash and Other (See Comments)     Hypoglycemia     Haloperidol Rash     Lithium Rash     Nuts - Unspecified [Nuts] Rash     Almonds specifically      Ondansetron Rash     Oxycontin [Oxycodone] Rash     Penicillins Rash and Other (See Comments)     Migraines, dizzy and sweating     Thallium-201 [Thallous Chloride Tl 201] Rash     Thioridazine Rash     Tizanidine Rash     Toradol [Ketorolac] Rash       FAMILY HISTORY:  Family History   Adopted: Yes       SOCIAL HISTORY:   Social History     Socioeconomic History     Marital status:      Spouse name: None     Number of children: None     Years of education: None     Highest education level: None   Tobacco Use     Smoking status: Every Day     Packs/day: 0.25     Types: Cigarettes     Smokeless tobacco: Never   Substance and Sexual Activity     Alcohol use: Not Currently     Drug use: Not Currently       VITALS:  Patient Vitals for the past 24 hrs:   BP Temp Temp src Pulse Resp SpO2   04/12/23 2258 -- -- -- -- -- 94 %   04/12/23 2257 134/62 -- -- 93 23 91 %   04/12/23 2255 -- -- -- 93 23 90 %   04/12/23 2237 120/67 98.1  F (36.7  C) Oral 94 26 92 %        PHYSICAL EXAM    Constitutional:  Awake, alert, in mild distress  HENT:  Normocephalic, Atraumatic. Bilateral external ears normal. Oropharynx moist. Nose normal. Neck- Normal range of motion with no guarding, No midline cervical tenderness, " Supple, No stridor.   Eyes:  PERRL, EOMI with no signs of entrapment, Conjunctiva normal, No discharge.   Respiratory:  Normal breath sounds, No respiratory distress, No wheezing.    Cardiovascular:  Normal heart rate, Normal rhythm, No appreciable rubs or gallops.   GI:  Soft, RLQ tenderness, Slightly distended abdomen, No palpable masses  Musculoskeletal:  Intact distal pulses, No edema. Good range of motion in all major joints. No tenderness to palpation or major deformities noted.  Integument:  Warm, Dry, No erythema, No rash.   Neurologic:  Alert & oriented, Normal motor function, Normal sensory function, No focal deficits noted.   Psychiatric:  Affect normal, Judgment normal, Mood normal.     LAB:  All pertinent labs reviewed and interpreted.     Results for orders placed or performed during the hospital encounter of 04/12/23   CT Abdomen Pelvis w/o Contrast     Status: None    Narrative    EXAM: CT ABDOMEN AND PELVIS WITHOUT CONTRAST  LOCATION: [facility]  DATE/TIME: 4/13/2023 1:44 AM CDT    INDICATION: Leukocytosis. Right lower quadrant tenderness.  COMPARISON: 03/05/2023.    TECHNIQUE: CT scan of the abdomen and pelvis was performed without IV contrast. Multiplanar reformats were obtained. Dose reduction techniques were used.  CONTRAST: None.    FINDINGS:    LOWER CHEST: Unremarkable.    HEPATOBILIARY: A few punctate calcifications in the liver are nonspecific, but could relate to prior granulomatous infection. Prior cholecystectomy.    SPLEEN: Unremarkable.    PANCREAS: Moderate diffuse parenchymal atrophy.    ADRENAL GLANDS: Unremarkable.    KIDNEYS/BLADDER: Probable tiny nonobstructing calculus in the inferior pole of the right kidney.    BOWEL: A moderate amount of stool is present in the colon. The appendix is not visualized.    LYMPH NODES: Unremarkable.    PELVIC ORGANS: No acute findings.    MUSCULOSKELETAL: No acute findings.    OTHER: A device is present in the right  anterior abdominal wall and connected to a lead that courses anterior to the stomach. A device is present in the subcutaneous fat of the lower left back and connected to a left sacral electrode. A residual portion   of a right sacral electrode is present. Tiny paraumbilical hernia containing fat.      Impression    IMPRESSION: No convincing cause of acute pain identified in the abdomen or pelvis.    [place] Draw     Status: None    Narrative    The following orders were created for panel order [place] Draw.  Procedure                               Abnormality         Status                     ---------                               -----------         ------                     Extra Blue Top Tube[[id number]]                              Final result               Extra Red Top Tube[[id number]]                               Final result               Extra Green Top (Lithium...[[id number]]                      Final result               Extra Purple Top Tube[[id number]]                            Final result               Extra Heparinized Syringe[[id number]]                        Final result                 Please view results for these tests on the individual orders.   Extra Blue Top Tube     Status: None   Result Value Ref Range    Hold Specimen JIC    Extra Red Top Tube     Status: None   Result Value Ref Range    Hold Specimen JIC    Extra Green Top (Lithium Heparin) Tube     Status: None   Result Value Ref Range    Hold Specimen JIC    Extra Purple Top Tube     Status: None   Result Value Ref Range    Hold Specimen JIC    Extra Heparinized Syringe     Status: None   Result Value Ref Range    Hold Specimen JIC    Comprehensive metabolic panel     Status: Abnormal   Result Value Ref Range    Sodium 129 (L) 136 - 145 mmol/L    Potassium 3.3 (L) 3.4 - 5.3 mmol/L    Chloride 98 98 - 107 mmol/L    Carbon Dioxide (CO2) 22 22 - 29 mmol/L    Anion Gap 9 7 - 15 mmol/L    Urea Nitrogen 10.6 8.0 - 23.0 mg/dL    Creatinine  0.53 0.51 - 0.95 mg/dL    Calcium 9.7 8.8 - 10.2 mg/dL    Glucose 281 (H) 70 - 99 mg/dL    Alkaline Phosphatase 129 (H) 35 - 104 U/L    AST 21 10 - 35 U/L    ALT 24 10 - 35 U/L    Protein Total 6.7 6.4 - 8.3 g/dL    Albumin 3.7 3.5 - 5.2 g/dL    Bilirubin Total <0.2 <=1.2 mg/dL    GFR Estimate >90 >60 mL/min/1.73m2   Lipase     Status: Normal   Result Value Ref Range    Lipase 13 13 - 60 U/L   Troponin T, High Sensitivity     Status: Normal   Result Value Ref Range    Troponin T, High Sensitivity 9 <=14 ng/L   CBC (+ platelets, no diff)     Status: Abnormal   Result Value Ref Range    WBC Count 17.5 (H) 4.0 - 11.0 10e3/uL    RBC Count 4.28 3.80 - 5.20 10e6/uL    Hemoglobin 13.8 11.7 - 15.7 g/dL    Hematocrit 40.5 35.0 - 47.0 %    MCV 95 78 - 100 fL    MCH 32.2 26.5 - 33.0 pg    MCHC 34.1 31.5 - 36.5 g/dL    RDW 13.6 10.0 - 15.0 %    Platelet Count 205 150 - 450 10e3/uL   Lactic Acid STAT     Status: Normal   Result Value Ref Range    Lactic Acid 1.4 0.7 - 2.0 mmol/L     RADIOLOGY:  Reviewed all pertinent imaging. Please see official radiology report.  No orders to display       EKG:    Normal sinus rhythm with first-degree AV block.  Low voltage QRS.  No acute ST segment abnormalities.  Unchanged compared to July 14, 2019  I have independently reviewed and interpreted the EKG(s) documented above.           I, Sudarshan Chamorro, am serving as a scribe to document services personally performed by Aaron oMreau MD, based on my observation and the provider's statements to me. I, Aaron Moreau MD attest that Sudarshan Chamorro is acting in a scribe capacity, has observed my performance of the services and has documented them in accordance with my direction.    Aaron Moreau M.D.  Emergency Medicine  [facility] EMERGENCY DEPARTMENT     Aaron Moreau MD  04/13/23 0230       Aaron Moreau MD  04/13/23 0234

## 2023-04-14 LAB
ATRIAL RATE - MUSE: 94 BPM
DIASTOLIC BLOOD PRESSURE - MUSE: NORMAL MMHG
INTERPRETATION ECG - MUSE: NORMAL
P AXIS - MUSE: 76 DEGREES
PR INTERVAL - MUSE: 234 MS
QRS DURATION - MUSE: 80 MS
QT - MUSE: 378 MS
QTC - MUSE: 472 MS
R AXIS - MUSE: 89 DEGREES
SYSTOLIC BLOOD PRESSURE - MUSE: NORMAL MMHG
T AXIS - MUSE: 80 DEGREES
VENTRICULAR RATE- MUSE: 94 BPM

## 2023-05-01 ENCOUNTER — HOSPITAL ENCOUNTER (EMERGENCY)
Facility: HOSPITAL | Age: 63
Discharge: HOME | End: 2023-05-02
Attending: EMERGENCY MEDICINE | Admitting: EMERGENCY MEDICINE
Payer: MEDICAID

## 2023-05-01 DIAGNOSIS — R09.81 CONGESTION OF PARANASAL SINUS: ICD-10-CM

## 2023-05-01 DIAGNOSIS — R05.1 ACUTE COUGH: ICD-10-CM

## 2023-05-01 PROCEDURE — 93005 ELECTROCARDIOGRAM TRACING: CPT | Performed by: EMERGENCY MEDICINE

## 2023-05-01 PROCEDURE — C9803 HOPD COVID-19 SPEC COLLECT: HCPCS

## 2023-05-01 PROCEDURE — 87637 SARSCOV2&INF A&B&RSV AMP PRB: CPT | Performed by: EMERGENCY MEDICINE

## 2023-05-01 PROCEDURE — 96374 THER/PROPH/DIAG INJ IV PUSH: CPT

## 2023-05-01 PROCEDURE — 99285 EMERGENCY DEPT VISIT HI MDM: CPT | Mod: CS,25

## 2023-05-01 RX ORDER — METOCLOPRAMIDE HYDROCHLORIDE 5 MG/ML
5 INJECTION INTRAMUSCULAR; INTRAVENOUS ONCE
Status: COMPLETED | OUTPATIENT
Start: 2023-05-02 | End: 2023-05-02

## 2023-05-02 ENCOUNTER — APPOINTMENT (OUTPATIENT)
Dept: RADIOLOGY | Facility: HOSPITAL | Age: 63
End: 2023-05-02
Attending: EMERGENCY MEDICINE
Payer: MEDICAID

## 2023-05-02 VITALS
RESPIRATION RATE: 22 BRPM | OXYGEN SATURATION: 91 % | SYSTOLIC BLOOD PRESSURE: 145 MMHG | DIASTOLIC BLOOD PRESSURE: 65 MMHG | HEART RATE: 91 BPM | TEMPERATURE: 98.4 F

## 2023-05-02 LAB
ALBUMIN SERPL BCG-MCNC: 3.6 G/DL (ref 3.5–5.2)
ALP SERPL-CCNC: 141 U/L (ref 35–104)
ALT SERPL W P-5'-P-CCNC: 21 U/L (ref 10–35)
ANION GAP SERPL CALCULATED.3IONS-SCNC: 8 MMOL/L (ref 7–15)
AST SERPL W P-5'-P-CCNC: 20 U/L (ref 10–35)
BASOPHILS # BLD AUTO: 0.1 10E3/UL (ref 0–0.2)
BASOPHILS NFR BLD AUTO: 1 %
BILIRUB DIRECT SERPL-MCNC: <0.2 MG/DL (ref 0–0.3)
BILIRUB SERPL-MCNC: <0.2 MG/DL
BUN SERPL-MCNC: 9 MG/DL (ref 8–23)
CALCIUM SERPL-MCNC: 10.2 MG/DL (ref 8.8–10.2)
CHLORIDE SERPL-SCNC: 101 MMOL/L (ref 98–107)
CREAT SERPL-MCNC: 0.6 MG/DL (ref 0.51–0.95)
DEPRECATED HCO3 PLAS-SCNC: 23 MMOL/L (ref 22–29)
EOSINOPHIL # BLD AUTO: 0.2 10E3/UL (ref 0–0.7)
EOSINOPHIL NFR BLD AUTO: 1 %
ERYTHROCYTE [DISTWIDTH] IN BLOOD BY AUTOMATED COUNT: 14.1 % (ref 10–15)
FLUAV RNA SPEC QL NAA+PROBE: NEGATIVE
FLUBV RNA RESP QL NAA+PROBE: NEGATIVE
GFR SERPL CREATININE-BSD FRML MDRD: >90 ML/MIN/1.73M2
GLUCOSE SERPL-MCNC: 272 MG/DL (ref 70–99)
HCT VFR BLD AUTO: 41.6 % (ref 35–47)
HGB BLD-MCNC: 14.1 G/DL (ref 11.7–15.7)
IMM GRANULOCYTES # BLD: 0.2 10E3/UL
IMM GRANULOCYTES NFR BLD: 1 %
LIPASE SERPL-CCNC: 19 U/L (ref 13–60)
LYMPHOCYTES # BLD AUTO: 3.2 10E3/UL (ref 0.8–5.3)
LYMPHOCYTES NFR BLD AUTO: 23 %
MAGNESIUM SERPL-MCNC: 1.7 MG/DL (ref 1.7–2.3)
MCH RBC QN AUTO: 31.9 PG (ref 26.5–33)
MCHC RBC AUTO-ENTMCNC: 33.9 G/DL (ref 31.5–36.5)
MCV RBC AUTO: 94 FL (ref 78–100)
MONOCYTES # BLD AUTO: 1.7 10E3/UL (ref 0–1.3)
MONOCYTES NFR BLD AUTO: 12 %
NEUTROPHILS # BLD AUTO: 8.7 10E3/UL (ref 1.6–8.3)
NEUTROPHILS NFR BLD AUTO: 62 %
NRBC # BLD AUTO: 0 10E3/UL
NRBC BLD AUTO-RTO: 0 /100
PLATELET # BLD AUTO: 225 10E3/UL (ref 150–450)
POTASSIUM SERPL-SCNC: 3.7 MMOL/L (ref 3.4–5.3)
PROT SERPL-MCNC: 6.9 G/DL (ref 6.4–8.3)
RBC # BLD AUTO: 4.42 10E6/UL (ref 3.8–5.2)
RSV RNA SPEC NAA+PROBE: NEGATIVE
SARS-COV-2 RNA RESP QL NAA+PROBE: NEGATIVE
SODIUM SERPL-SCNC: 132 MMOL/L (ref 136–145)
WBC # BLD AUTO: 13.9 10E3/UL (ref 4–11)

## 2023-05-02 PROCEDURE — 85025 COMPLETE CBC W/AUTO DIFF WBC: CPT | Performed by: EMERGENCY MEDICINE

## 2023-05-02 PROCEDURE — 250N000011 HC RX IP 250 OP 636: Performed by: EMERGENCY MEDICINE

## 2023-05-02 PROCEDURE — 80053 COMPREHEN METABOLIC PANEL: CPT | Performed by: EMERGENCY MEDICINE

## 2023-05-02 PROCEDURE — 36415 COLL VENOUS BLD VENIPUNCTURE: CPT | Performed by: EMERGENCY MEDICINE

## 2023-05-02 PROCEDURE — 999N000285 HC STATISTIC VASC ACCESS LAB DRAW WITH PIV START

## 2023-05-02 PROCEDURE — 71046 X-RAY EXAM CHEST 2 VIEWS: CPT

## 2023-05-02 PROCEDURE — 999N000127 HC STATISTIC PERIPHERAL IV START W US GUIDANCE

## 2023-05-02 PROCEDURE — 83690 ASSAY OF LIPASE: CPT | Performed by: EMERGENCY MEDICINE

## 2023-05-02 PROCEDURE — 83735 ASSAY OF MAGNESIUM: CPT | Performed by: EMERGENCY MEDICINE

## 2023-05-02 PROCEDURE — 82248 BILIRUBIN DIRECT: CPT | Performed by: EMERGENCY MEDICINE

## 2023-05-02 RX ADMIN — METOCLOPRAMIDE 5 MG: 5 INJECTION, SOLUTION INTRAMUSCULAR; INTRAVENOUS at 00:53

## 2023-05-02 ASSESSMENT — ACTIVITIES OF DAILY LIVING (ADL): ADLS_ACUITY_SCORE: 35

## 2023-05-02 NOTE — ED TRIAGE NOTES
Patient comes in w/ complaints of SOB x2 days. Productive cough (green sputum), weakness, 8/10 midsternal non-radiating chest pain, nausea/vomiting. Patient is A&Ox4.       Triage Assessment     Row Name 05/01/23 9092       Triage Assessment (Adult)    Airway WDL WDL       Respiratory WDL    Respiratory WDL X;rhythm/pattern

## 2023-05-02 NOTE — DISCHARGE INSTRUCTIONS
Follow up with primary clinic for re-evaluation and further cares.  You flu/covid/rsv testing was all negative, glucose was 272 and your chest xray was normal.

## 2023-05-02 NOTE — ED NOTES
Patient sent in [facility] wheelchair transport. Tolerated well, report given. Patient has keys to facility, sent home with walker, shoes, bag

## 2023-05-02 NOTE — ED PROVIDER NOTES
Emergency Department Encounter     Evaluation Date & Time:   5/1/2023 11:09 PM    CHIEF COMPLAINT:  Shortness of Breath      Triage Note:  Patient comes in w/ complaints of SOB x2 days. Productive cough (green sputum), weakness, 8/10 midsternal non-radiating chest pain, nausea/vomiting. Patient is A&Ox4.               ED COURSE & MEDICAL DECISION MAKING:     ED Course as of 05/02/23 0054   Tue May 02, 2023   0038 CXR (independent interpretation): no acute cardiopulmonary process    0038 WBC 13.9, which is below previous.  Flu/Covid/RSV all negative.   0049 Glucose 272, no DKA. Rest of labs all overall reassuring.   0050 Pt re-evaluated, updated on all results. Encouraged outpatient cares and follow up. Pt has 44 allergies, unable to prescribe much of anything for her cough/symptoms including tessalon perles as she as procaine allergy.       Pt here with 5 days of cough, congestion, green sputum and rhinorrhea.  Pt reports chest pain, worse with cough. She's nontoxic, afebrile with intact pulses and no calf tenderness/swelling. Chest pain is all reproducible with cough, palpation and low suspicion for ACS or aortic pathology.  Do not feel troponin testing or CTA chest indicated.  EKG non-ischemic, unremarkable.  She is IDDM and reports glucose at home in the 400s. Will get labs, CXR, covid/flu and reassess.     11:32 PM I met with patient for initial interview and encounter.     Medical Decision Making    History:    Supplemental history from: Documented in chart, if applicable and N/A    External Record(s) reviewed: Documented in chart, if applicable.    Work Up:    Chart documentation includes differential considered and any EKGs or imaging independently interpreted by provider, where specified.    In additional to work up documented, I considered the following work up: Documented in chart, if applicable.    External consultation:    Discussion of management with another provider: Documented in chart, if  applicable    Complicating factors:    Care impacted by chronic illness: Chronic Lung Disease and Diabetes    Care affected by social determinants of health: N/A    Disposition considerations: Discharge. No recommendations on prescription strength medication(s). I considered admission, but ultimately discharged patient after reassuring workup..      At the conclusion of the encounter I discussed the results of all the tests and the disposition. The questions were answered. The patient or family acknowledged understanding and was agreeable with the care plan.      MEDICATIONS GIVEN IN THE EMERGENCY DEPARTMENT:  Medications   metoclopramide (REGLAN) injection 5 mg (5 mg Intravenous $Given 5/2/23 0053)       NEW PRESCRIPTIONS STARTED AT TODAY'S ED VISIT:  New Prescriptions    No medications on file       HPI   HPI     Rukhsana Griffith is a 62 year old female with a pertinent history of COPD, DM type II, IBS, HTN, DLD, who presents to this ED via EMS for evaluation of shortness of breath.    Patient reports she has had 2 days of congestion and a cough productive of lime green sputum. She notes that she has had chest pains secondary to this cough, and today began coughing so hard that she vomits. She also reports some new dizziness today. She states that she does have asthma and took 4 nebulizers today without relief. She has not tried any OTC medications for her symptoms. She adds that her blood glucose has been over 400 since she developed symptoms despite her being compliant with her DM medications. She denies any fever, diarrhea, urinary symptoms, or any other complaints. Of note, patient currently lives in an independent living facility.      REVIEW OF SYSTEMS:  See HPI      Medical History     Past Medical History:   Diagnosis Date     COPD (chronic obstructive pulmonary disease) (H)      Diabetes (H)      HLD (hyperlipidemia)      Methamphetamine abuse (H)      Schizoaffective disorder (H)      Seizures (H)         Past Surgical History:   Procedure Laterality Date     HYSTERECTOMY       KNEE SURGERY       OTHER SURGICAL HISTORY      gastric pacemaker     OTHER SURGICAL HISTORY      interstim placement     NE REVISE/REMOVE PERIPH/GASTRIC NEUROSTIM/ N/A 5/15/2019    Procedure: PARTIAL REMOVAL OLD LEAD;  Surgeon: Jcarlos Muñiz MD;  Location: [facility] OR;  Service: Urology     RELEASE CARPAL TUNNEL         Family History   Adopted: Yes       Social History     Tobacco Use     Smoking status: Every Day     Packs/day: 0.25     Types: Cigarettes     Smokeless tobacco: Never   Substance Use Topics     Alcohol use: Not Currently     Drug use: Not Currently       acetaminophen (TYLENOL) 160 mg/5 mL (5 mL) Soln solution  albuterol (PROAIR HFA;PROVENTIL HFA;VENTOLIN HFA) 90 mcg/actuation inhaler  albuterol (PROVENTIL) 2.5 mg /3 mL (0.083 %) nebulizer solution  aluminum-magnesium hydroxide-simethicone (MAALOX MAX STRENGTH) 400-400-40 mg/5 mL suspension  ammonium lactate (AMLACTIN) 12 % cream  atropine 1 % ophthalmic solution  budesonide-formoterol (SYMBICORT) 160-4.5 mcg/actuation inhaler  cholecalciferol, vitamin D3, 2,000 unit Tab  cloZAPine (CLOZARIL) 100 MG tablet  diphenhydrAMINE (BENADRYL) 25 mg capsule  docusate sodium (COLACE) 100 MG capsule  EPINEPHrine (EPIPEN) 0.3 mg/0.3 mL atIn  escitalopram oxalate (LEXAPRO) 10 MG tablet  famotidine (PEPCID) 40 MG tablet  fluoride, sodium, (DENTAGEL) 1.1 % Gel dental gel  glucagon, human recombinant, (GLUCAGEN HYPOKIT) 1 mg injection  insulin aspart U-100 (NOVOLOG) 100 unit/mL injection  insulin glargine (LANTUS) 100 unit/mL injection  ipratropium-albuterol (DUO-NEB) 0.5-2.5 mg/3 mL nebulizer  lisinopril (PRINIVIL,ZESTRIL) 10 MG tablet  loratadine (CLARITIN) 10 mg tablet  lubiprostone (AMITIZA) 24 MCG capsule  magnesium hydroxide (MILK OF MAG) 400 mg/5 mL Susp suspension  mometasone (NASONEX) 50 mcg/actuation nasal spray  multivitamin with minerals (THERA-M) 9 mg  iron-400 mcg Tab tablet  nicotine (NICODERM CQ) 21 mg/24 hr  oxyCODONE-acetaminophen (PERCOCET/ENDOCET) 5-325 mg per tablet  polyethylene glycol (MIRALAX) 17 gram packet  pravastatin (PRAVACHOL) 40 MG tablet  ramelteon (ROZEREM) 8 mg tablet  SUMAtriptan (IMITREX) 100 MG tablet  topiramate (TOPAMAX) 200 MG tablet  triamcinolone (KENALOG) 0.1 % ointment  trimethobenzamide (TIGAN) 300 mg capsule        Physical Exam     Vitals:  BP (!) 145/65   Pulse 89   Temp 98.4  F (36.9  C) (Oral)   Resp 22   SpO2 96%     PHYSICAL EXAM:   Physical Exam  Vitals and nursing note reviewed.   Constitutional:       General: She is not in acute distress.     Appearance: Normal appearance.   HENT:      Head: Normocephalic and atraumatic.      Nose: Nose normal.      Mouth/Throat:      Mouth: Mucous membranes are moist.   Eyes:      Pupils: Pupils are equal, round, and reactive to light.   Neck:      Vascular: No JVD.   Cardiovascular:      Rate and Rhythm: Normal rate and regular rhythm.      Pulses: Normal pulses.           Radial pulses are 2+ on the right side and 2+ on the left side.        Dorsalis pedis pulses are 2+ on the right side and 2+ on the left side.   Pulmonary:      Effort: Pulmonary effort is normal. No respiratory distress.      Breath sounds: Normal breath sounds.   Chest:      Comments: Mild tenderness to the lower chest wall  Abdominal:      Palpations: Abdomen is soft.      Comments: Mild tenderness to the upper abdomin   Musculoskeletal:      Cervical back: Full passive range of motion without pain and neck supple.      Comments: No calf tenderness or swelling b/l   Skin:     General: Skin is warm.      Findings: No rash.   Neurological:      General: No focal deficit present.      Mental Status: She is alert. Mental status is at baseline.      Comments: Fluent speech, no acute lateralizing deficits   Psychiatric:         Mood and Affect: Mood normal.         Behavior: Behavior normal.         Results      LAB:  All pertinent labs reviewed and interpreted  Labs Ordered and Resulted from Time of ED Arrival to Time of ED Departure   BASIC METABOLIC PANEL - Abnormal       Result Value    Sodium 132 (*)     Potassium 3.7      Chloride 101      Carbon Dioxide (CO2) 23      Anion Gap 8      Urea Nitrogen 9.0      Creatinine 0.60      Calcium 10.2      Glucose 272 (*)     GFR Estimate >90     HEPATIC FUNCTION PANEL - Abnormal    Protein Total 6.9      Albumin 3.6      Bilirubin Total <0.2      Alkaline Phosphatase 141 (*)     AST 20      ALT 21      Bilirubin Direct <0.20     CBC WITH PLATELETS AND DIFFERENTIAL - Abnormal    WBC Count 13.9 (*)     RBC Count 4.42      Hemoglobin 14.1      Hematocrit 41.6      MCV 94      MCH 31.9      MCHC 33.9      RDW 14.1      Platelet Count 225      % Neutrophils 62      % Lymphocytes 23      % Monocytes 12      % Eosinophils 1      % Basophils 1      % Immature Granulocytes 1      NRBCs per 100 WBC 0      Absolute Neutrophils 8.7 (*)     Absolute Lymphocytes 3.2      Absolute Monocytes 1.7 (*)     Absolute Eosinophils 0.2      Absolute Basophils 0.1      Absolute Immature Granulocytes 0.2      Absolute NRBCs 0.0     MAGNESIUM - Normal    Magnesium 1.7     INFLUENZA A/B, RSV, & SARS-COV2 PCR - Normal    Influenza A PCR Negative      Influenza B PCR Negative      RSV PCR Negative      SARS CoV2 PCR Negative     LIPASE - Normal    Lipase 19         RADIOLOGY:  XR Chest 2 Views   Preliminary Result   IMPRESSION:    1. No evidence of active cardiopulmonary disease.   2. Hyperinflated lungs, suggestive of chronic obstructive pulmonary disease.                    ECG:  NSR, rate 88, 1st degree AV block, no acute ischemia, similar to previous EKG from 4/2023    I have independently reviewed and interpreted the EKG(s) documented above     PROCEDURES:  Procedures:  none      FINAL IMPRESSION:    ICD-10-CM    1. Acute cough  R05.1       2. Congestion of paranasal sinus  R09.81           0  minutes of critical care time      I, Aldair Camilo, am serving as a scribe to document services personally performed by Dr. Chase Shell, based on my observations and the provider's statements to me. I, Chase Shell, DO attest that Aldair Camilo is acting in a scribe capacity, has observed my performance of the services and has documented them in accordance with my direction.      Chase Shell DO  Emergency Medicine  [facility] EMERGENCY DEPARTMENT  5/1/2023  11:19 PM        Chase Shell MD  05/02/23 0054

## 2023-05-02 NOTE — ED NOTES
Bed: JNEDG  Expected date: 5/1/23  Expected time: 10:58 PM  Means of arrival: Ambulance  Comments:  [place] - weakness, dizzy, N/V

## 2023-05-04 ENCOUNTER — TRANSCRIBE ORDERS (OUTPATIENT)
Dept: OTHER | Age: 63
End: 2023-05-04

## 2023-05-04 DIAGNOSIS — G89.29 CHRONIC INTRACTABLE HEADACHE, UNSPECIFIED HEADACHE TYPE: Primary | ICD-10-CM

## 2023-05-04 DIAGNOSIS — R51.9 CHRONIC INTRACTABLE HEADACHE, UNSPECIFIED HEADACHE TYPE: Primary | ICD-10-CM

## 2023-05-18 DIAGNOSIS — H53.40 VISUAL FIELD DEFECT: Primary | ICD-10-CM

## 2023-05-31 NOTE — TELEPHONE ENCOUNTER
FUTURE VISIT INFORMATION      FUTURE VISIT INFORMATION:    Date: 7/5/23    Time: 9:00am    Location: [facility]  REFERRAL INFORMATION:    Referring provider:  Dr Abilio Rehman     Referring providers clinic:  [facility]    Reason for visit/diagnosis  chronic headache, neuritis of both eyes , retrobulbar neuritis    RECORDS REQUESTED FROM:       Clinic name Comments Records Status Imaging Status   [facility] OV/referral 5/3/23  Ov/notes 5/3/23-7/21/10 Care Everywhere    Imaging CT head 4/11/23 [facility] PAC

## 2023-06-03 ENCOUNTER — APPOINTMENT (OUTPATIENT)
Dept: RADIOLOGY | Facility: HOSPITAL | Age: 63
End: 2023-06-03
Attending: EMERGENCY MEDICINE
Payer: MEDICAID

## 2023-06-03 ENCOUNTER — HOSPITAL ENCOUNTER (INPATIENT)
Facility: HOSPITAL | Age: 63
LOS: 1 days | Discharge: LEFT BEFORE DISCHARGE | End: 2023-06-04
Attending: EMERGENCY MEDICINE | Admitting: INTERNAL MEDICINE
Payer: MEDICAID

## 2023-06-03 DIAGNOSIS — J44.1 COPD EXACERBATION (H): ICD-10-CM

## 2023-06-03 LAB
ANION GAP SERPL CALCULATED.3IONS-SCNC: 9 MMOL/L (ref 7–15)
BASOPHILS # BLD AUTO: 0.1 10E3/UL (ref 0–0.2)
BASOPHILS NFR BLD AUTO: 1 %
BUN SERPL-MCNC: 8.4 MG/DL (ref 8–23)
CALCIUM SERPL-MCNC: 10.5 MG/DL (ref 8.8–10.2)
CHLORIDE SERPL-SCNC: 103 MMOL/L (ref 98–107)
CREAT SERPL-MCNC: 0.67 MG/DL (ref 0.51–0.95)
DEPRECATED HCO3 PLAS-SCNC: 24 MMOL/L (ref 22–29)
EOSINOPHIL # BLD AUTO: 0.2 10E3/UL (ref 0–0.7)
EOSINOPHIL NFR BLD AUTO: 1 %
ERYTHROCYTE [DISTWIDTH] IN BLOOD BY AUTOMATED COUNT: 14.3 % (ref 10–15)
GFR SERPL CREATININE-BSD FRML MDRD: >90 ML/MIN/1.73M2
GLUCOSE SERPL-MCNC: 124 MG/DL (ref 70–99)
HCT VFR BLD AUTO: 43.5 % (ref 35–47)
HGB BLD-MCNC: 14.1 G/DL (ref 11.7–15.7)
IMM GRANULOCYTES # BLD: 0.1 10E3/UL
IMM GRANULOCYTES NFR BLD: 1 %
LYMPHOCYTES # BLD AUTO: 3.3 10E3/UL (ref 0.8–5.3)
LYMPHOCYTES NFR BLD AUTO: 20 %
MCH RBC QN AUTO: 30.9 PG (ref 26.5–33)
MCHC RBC AUTO-ENTMCNC: 32.4 G/DL (ref 31.5–36.5)
MCV RBC AUTO: 95 FL (ref 78–100)
MONOCYTES # BLD AUTO: 1.9 10E3/UL (ref 0–1.3)
MONOCYTES NFR BLD AUTO: 11 %
NEUTROPHILS # BLD AUTO: 11 10E3/UL (ref 1.6–8.3)
NEUTROPHILS NFR BLD AUTO: 66 %
NRBC # BLD AUTO: 0 10E3/UL
NRBC BLD AUTO-RTO: 0 /100
NT-PROBNP SERPL-MCNC: 101 PG/ML (ref 0–900)
PLATELET # BLD AUTO: 222 10E3/UL (ref 150–450)
POTASSIUM SERPL-SCNC: 3.2 MMOL/L (ref 3.4–5.3)
RBC # BLD AUTO: 4.57 10E6/UL (ref 3.8–5.2)
SODIUM SERPL-SCNC: 136 MMOL/L (ref 136–145)
TROPONIN T SERPL HS-MCNC: 12 NG/L
WBC # BLD AUTO: 16.6 10E3/UL (ref 4–11)

## 2023-06-03 PROCEDURE — 94640 AIRWAY INHALATION TREATMENT: CPT

## 2023-06-03 PROCEDURE — 83036 HEMOGLOBIN GLYCOSYLATED A1C: CPT | Performed by: INTERNAL MEDICINE

## 2023-06-03 PROCEDURE — 999N000157 HC STATISTIC RCP TIME EA 10 MIN

## 2023-06-03 PROCEDURE — 250N000011 HC RX IP 250 OP 636: Performed by: EMERGENCY MEDICINE

## 2023-06-03 PROCEDURE — 80048 BASIC METABOLIC PNL TOTAL CA: CPT | Performed by: EMERGENCY MEDICINE

## 2023-06-03 PROCEDURE — 83880 ASSAY OF NATRIURETIC PEPTIDE: CPT | Performed by: EMERGENCY MEDICINE

## 2023-06-03 PROCEDURE — 71045 X-RAY EXAM CHEST 1 VIEW: CPT

## 2023-06-03 PROCEDURE — 84145 PROCALCITONIN (PCT): CPT | Performed by: INTERNAL MEDICINE

## 2023-06-03 PROCEDURE — 93005 ELECTROCARDIOGRAM TRACING: CPT | Performed by: EMERGENCY MEDICINE

## 2023-06-03 PROCEDURE — C9803 HOPD COVID-19 SPEC COLLECT: HCPCS

## 2023-06-03 PROCEDURE — 99291 CRITICAL CARE FIRST HOUR: CPT | Mod: 25

## 2023-06-03 PROCEDURE — 85025 COMPLETE CBC W/AUTO DIFF WBC: CPT | Performed by: EMERGENCY MEDICINE

## 2023-06-03 PROCEDURE — 84484 ASSAY OF TROPONIN QUANT: CPT | Performed by: EMERGENCY MEDICINE

## 2023-06-03 PROCEDURE — 36415 COLL VENOUS BLD VENIPUNCTURE: CPT | Performed by: EMERGENCY MEDICINE

## 2023-06-03 PROCEDURE — 96374 THER/PROPH/DIAG INJ IV PUSH: CPT

## 2023-06-03 PROCEDURE — 82962 GLUCOSE BLOOD TEST: CPT

## 2023-06-03 PROCEDURE — 250N000009 HC RX 250: Performed by: EMERGENCY MEDICINE

## 2023-06-03 RX ORDER — ALBUTEROL SULFATE 0.83 MG/ML
5 SOLUTION RESPIRATORY (INHALATION) ONCE
Status: COMPLETED | OUTPATIENT
Start: 2023-06-03 | End: 2023-06-03

## 2023-06-03 RX ORDER — METHYLPREDNISOLONE SODIUM SUCCINATE 125 MG/2ML
125 INJECTION, POWDER, LYOPHILIZED, FOR SOLUTION INTRAMUSCULAR; INTRAVENOUS ONCE
Status: COMPLETED | OUTPATIENT
Start: 2023-06-03 | End: 2023-06-03

## 2023-06-03 RX ORDER — IPRATROPIUM BROMIDE AND ALBUTEROL SULFATE 2.5; .5 MG/3ML; MG/3ML
3 SOLUTION RESPIRATORY (INHALATION) ONCE
Status: COMPLETED | OUTPATIENT
Start: 2023-06-03 | End: 2023-06-03

## 2023-06-03 RX ADMIN — ALBUTEROL SULFATE 5 MG: 2.5 SOLUTION RESPIRATORY (INHALATION) at 22:25

## 2023-06-03 RX ADMIN — IPRATROPIUM BROMIDE AND ALBUTEROL SULFATE 3 ML: .5; 3 SOLUTION RESPIRATORY (INHALATION) at 22:25

## 2023-06-03 RX ADMIN — METHYLPREDNISOLONE SODIUM SUCCINATE 125 MG: 125 INJECTION INTRAMUSCULAR; INTRAVENOUS at 22:33

## 2023-06-03 ASSESSMENT — ACTIVITIES OF DAILY LIVING (ADL): ADLS_ACUITY_SCORE: 35

## 2023-06-04 VITALS
WEIGHT: 143.08 LBS | HEART RATE: 100 BPM | RESPIRATION RATE: 24 BRPM | OXYGEN SATURATION: 93 % | TEMPERATURE: 97.9 F | DIASTOLIC BLOOD PRESSURE: 71 MMHG | SYSTOLIC BLOOD PRESSURE: 147 MMHG | BODY MASS INDEX: 28.9 KG/M2

## 2023-06-04 PROBLEM — J44.1 COPD EXACERBATION (H): Status: ACTIVE | Noted: 2023-06-04

## 2023-06-04 LAB
BASE EXCESS BLDV CALC-SCNC: -2.3 MMOL/L
FLUAV RNA SPEC QL NAA+PROBE: NEGATIVE
FLUBV RNA RESP QL NAA+PROBE: NEGATIVE
GLUCOSE BLDC GLUCOMTR-MCNC: 196 MG/DL (ref 70–99)
GLUCOSE BLDC GLUCOMTR-MCNC: 233 MG/DL (ref 70–99)
HBA1C MFR BLD: 6.9 %
HCO3 BLDV-SCNC: 24 MMOL/L (ref 24–30)
OXYHGB MFR BLDV: 72.3 % (ref 70–75)
PCO2 BLDV: 50 MM HG (ref 35–50)
PH BLDV: 7.29 [PH] (ref 7.35–7.45)
PO2 BLDV: 41 MM HG (ref 25–47)
PROCALCITONIN SERPL IA-MCNC: 0.04 NG/ML
RSV RNA SPEC NAA+PROBE: NEGATIVE
SAO2 % BLDV: 75.5 % (ref 70–75)
SARS-COV-2 RNA RESP QL NAA+PROBE: NEGATIVE

## 2023-06-04 PROCEDURE — 82962 GLUCOSE BLOOD TEST: CPT

## 2023-06-04 PROCEDURE — 250N000011 HC RX IP 250 OP 636: Performed by: INTERNAL MEDICINE

## 2023-06-04 PROCEDURE — 99222 1ST HOSP IP/OBS MODERATE 55: CPT | Performed by: INTERNAL MEDICINE

## 2023-06-04 PROCEDURE — 36415 COLL VENOUS BLD VENIPUNCTURE: CPT | Performed by: INTERNAL MEDICINE

## 2023-06-04 PROCEDURE — 999N000157 HC STATISTIC RCP TIME EA 10 MIN

## 2023-06-04 PROCEDURE — 120N000001 HC R&B MED SURG/OB

## 2023-06-04 PROCEDURE — 250N000009 HC RX 250: Performed by: INTERNAL MEDICINE

## 2023-06-04 PROCEDURE — 94640 AIRWAY INHALATION TREATMENT: CPT | Mod: 76

## 2023-06-04 PROCEDURE — 82805 BLOOD GASES W/O2 SATURATION: CPT | Performed by: INTERNAL MEDICINE

## 2023-06-04 PROCEDURE — 87637 SARSCOV2&INF A&B&RSV AMP PRB: CPT | Performed by: INTERNAL MEDICINE

## 2023-06-04 PROCEDURE — 250N000012 HC RX MED GY IP 250 OP 636 PS 637: Performed by: INTERNAL MEDICINE

## 2023-06-04 RX ORDER — DEXTROSE MONOHYDRATE 25 G/50ML
25-50 INJECTION, SOLUTION INTRAVENOUS
Status: DISCONTINUED | OUTPATIENT
Start: 2023-06-04 | End: 2023-06-04 | Stop reason: HOSPADM

## 2023-06-04 RX ORDER — ALBUTEROL SULFATE 5 MG/ML
2.5 SOLUTION RESPIRATORY (INHALATION) EVERY 6 HOURS PRN
Status: DISCONTINUED | OUTPATIENT
Start: 2023-06-04 | End: 2023-06-04 | Stop reason: HOSPADM

## 2023-06-04 RX ORDER — AMOXICILLIN 250 MG
1 CAPSULE ORAL 2 TIMES DAILY PRN
Status: DISCONTINUED | OUTPATIENT
Start: 2023-06-04 | End: 2023-06-04 | Stop reason: HOSPADM

## 2023-06-04 RX ORDER — POTASSIUM CHLORIDE 1500 MG/1
40 TABLET, EXTENDED RELEASE ORAL ONCE
Status: DISCONTINUED | OUTPATIENT
Start: 2023-06-04 | End: 2023-06-04 | Stop reason: HOSPADM

## 2023-06-04 RX ORDER — NICOTINE POLACRILEX 4 MG
15-30 LOZENGE BUCCAL
Status: DISCONTINUED | OUTPATIENT
Start: 2023-06-04 | End: 2023-06-04 | Stop reason: HOSPADM

## 2023-06-04 RX ORDER — AMOXICILLIN 250 MG
2 CAPSULE ORAL 2 TIMES DAILY PRN
Status: DISCONTINUED | OUTPATIENT
Start: 2023-06-04 | End: 2023-06-04 | Stop reason: HOSPADM

## 2023-06-04 RX ORDER — IPRATROPIUM BROMIDE AND ALBUTEROL SULFATE 2.5; .5 MG/3ML; MG/3ML
3 SOLUTION RESPIRATORY (INHALATION)
Status: DISCONTINUED | OUTPATIENT
Start: 2023-06-04 | End: 2023-06-04

## 2023-06-04 RX ORDER — METHYLPREDNISOLONE SODIUM SUCCINATE 125 MG/2ML
60 INJECTION, POWDER, LYOPHILIZED, FOR SOLUTION INTRAMUSCULAR; INTRAVENOUS EVERY 8 HOURS
Status: DISCONTINUED | OUTPATIENT
Start: 2023-06-04 | End: 2023-06-04 | Stop reason: HOSPADM

## 2023-06-04 RX ORDER — LIDOCAINE 40 MG/G
CREAM TOPICAL
Status: DISCONTINUED | OUTPATIENT
Start: 2023-06-04 | End: 2023-06-04 | Stop reason: HOSPADM

## 2023-06-04 RX ORDER — ENOXAPARIN SODIUM 100 MG/ML
40 INJECTION SUBCUTANEOUS EVERY 24 HOURS
Status: DISCONTINUED | OUTPATIENT
Start: 2023-06-04 | End: 2023-06-04 | Stop reason: HOSPADM

## 2023-06-04 RX ORDER — LEVALBUTEROL INHALATION SOLUTION 1.25 MG/3ML
1.25 SOLUTION RESPIRATORY (INHALATION)
Status: DISCONTINUED | OUTPATIENT
Start: 2023-06-04 | End: 2023-06-04 | Stop reason: HOSPADM

## 2023-06-04 RX ADMIN — INSULIN ASPART 1 UNITS: 100 INJECTION, SOLUTION INTRAVENOUS; SUBCUTANEOUS at 08:05

## 2023-06-04 RX ADMIN — METHYLPREDNISOLONE SODIUM SUCCINATE 62.5 MG: 125 INJECTION INTRAMUSCULAR; INTRAVENOUS at 08:13

## 2023-06-04 RX ADMIN — ENOXAPARIN SODIUM 40 MG: 40 INJECTION SUBCUTANEOUS at 08:12

## 2023-06-04 RX ADMIN — IPRATROPIUM BROMIDE 0.5 MG: 0.5 SOLUTION RESPIRATORY (INHALATION) at 07:40

## 2023-06-04 RX ADMIN — LEVALBUTEROL HYDROCHLORIDE 1.25 MG: 1.25 SOLUTION RESPIRATORY (INHALATION) at 07:40

## 2023-06-04 ASSESSMENT — ACTIVITIES OF DAILY LIVING (ADL)
ADLS_ACUITY_SCORE: 35
ADLS_ACUITY_SCORE: 37
ADLS_ACUITY_SCORE: 35

## 2023-06-04 NOTE — H&P
[facility]    History and Physical - Hospitalist Service       Date of Admission:  6/3/2023    Assessment & Plan      Rukhsana Griffith is a 62 year old female with extensive past medical history including remote history of alcohol and drug use, tobacco use, DM2, schizoaffective disorder, seizures, asthma/COPD, recent acute bronchitis and asthma exacerbation  admitted on 6/3/2023 with shortness of breath, cough and hypoxia    1.  COPD/asthma exacerbation.  IV steroids, scheduled nebs.  Chest x-ray without clear infiltrates but will check procalcitonin.  Hold off antibiotics at this time.    2.  Acute respiratory failure with hypoxia secondary to above.  Pro BNP is not elevated.  Supplemental O2 to keep O2 sats above 90%    3.  Hypokalemia.  Replace per protocol    4.  DM2 with long-term insulin use.  Reportedly was hypoglycemic at home prior to arrival to the hospital.  Monitor blood sugars with meals and at bedtime.  Hold off long-acting insulin at this time, will use insulin sliding scale if needed    5.  Schizoaffective disorder.  Resume PTA meds once med rec is completed    Addendum 2:59AM Procalcitonin not elevated 0.04 - will monitor off antibiotics.         Diet: Combination Diet Moderate Consistent Carb (60 g CHO per Meal) Diet  DVT Prophylaxis: Enoxaparin (Lovenox) SQ  Chatterjee Catheter: Not present  Lines: None     Cardiac Monitoring: None  Code Status: Full CodeFull code (unassessed)    Clinically Significant Risk Factors Present on Admission        # Hypokalemia: Lowest K = 3.2 mmol/L in last 2 days, will replace as needed    # Hypercalcemia: Highest Ca = 10.5 mg/dL in last 2 days, will monitor as appropriate        # Hypertension: Noted on problem list               Disposition Plan      Expected Discharge Date: 06/06/2023                  Ellen Colin MD  Hospitalist Service  [facility]  Securely message with [employer] (more info)  Text page via [facility]  Paging/Directory     ______________________________________________________________________    Chief Complaint   Shortness of breath, productive cough     History is obtained from the ER provider and chart review    History of Present Illness   Rukhsana Griffith is a 62 year old female with extensive past medical history including remote history of alcohol and drug use, tobacco use, DM2, schizoaffective disorder, seizures, asthma/COPD, recent acute bronchitis and asthma exacerbation who presented for evaluation of the above complaints    At the time of my exam patient is sleeping and only responds to sternal rub.  Per ER provider note patient called EMS due to low blood sugar in the 50s.  Upon EMS arrival blood sugar was 137.  She also reported productive cough and shortness of breath and that she was out of her nebulizer.  Patient's O2 sats dropped to the mid 80s after walking a few steps.  EMS provided 1 DuoNeb in route, as well as 6 L O2.  To the ER provider patient reported gradually worsening productive cough and shortness of breath for about 3 days.  Also endorsed intermittent fever and chills, as well as left-sided chest pain.  On initial evaluation in the ER patient was tachycardic, hypertensive 155/85, O2 sats 90% on 4 LPM.  Her labs were significant for WBC 16.6, potassium 3.2, calcium 10.5, N-terminal proBNP 101, high-sensitivity troponin T 12.  Chest x-ray did not show any clear infiltrates.    Per chart review patient had virtual visit with her PCP on 5/2/2023, reported having cough with green sputum, was prescribed doxycycline and prednisone for acute bronchitis/asthma exacerbation.        Past Medical History    Past Medical History:   Diagnosis Date     COPD (chronic obstructive pulmonary disease) (H)      Diabetes (H)      HLD (hyperlipidemia)      Methamphetamine abuse (H)      Schizoaffective disorder (H)      Seizures (H)        Past Surgical History   Past Surgical History:   Procedure Laterality  Date     HYSTERECTOMY       KNEE SURGERY       OTHER SURGICAL HISTORY      gastric pacemaker     OTHER SURGICAL HISTORY      interstim placement     PA REVISE/REMOVE PERIPH/GASTRIC NEUROSTIM/ N/A 5/15/2019    Procedure: PARTIAL REMOVAL OLD LEAD;  Surgeon: Jcarlos Muñiz MD;  Location: [facility];  Service: Urology     RELEASE CARPAL TUNNEL         Prior to Admission Medications   Prior to Admission Medications   Prescriptions Last Dose Informant Patient Reported? Taking?   EPINEPHrine (EPIPEN) 0.3 mg/0.3 mL atIn   Yes No   Sig: [EPINEPHRINE (EPIPEN) 0.3 MG/0.3 ML ATIN] Inject 0.3 mg into the shoulder, thigh, or buttocks as needed.   SUMAtriptan (IMITREX) 100 MG tablet   Yes No   Sig: [SUMATRIPTAN (IMITREX) 100 MG TABLET] Take 100 mg by mouth every 2 (two) hours as needed for migraine.   acetaminophen (TYLENOL) 160 mg/5 mL (5 mL) Soln solution   Yes No   Sig: [ACETAMINOPHEN (TYLENOL) 160 MG/5 ML (5 ML) SOLN SOLUTION] Take 10 mL by mouth every 4 (four) hours as needed (Pain).          albuterol (PROAIR HFA;PROVENTIL HFA;VENTOLIN HFA) 90 mcg/actuation inhaler   Yes No   Sig: [ALBUTEROL (PROAIR HFA;PROVENTIL HFA;VENTOLIN HFA) 90 MCG/ACTUATION INHALER] Inhale 2 puffs every 6 (six) hours as needed for wheezing.   albuterol (PROVENTIL) 2.5 mg /3 mL (0.083 %) nebulizer solution   Yes No   Sig: [ALBUTEROL (PROVENTIL) 2.5 MG /3 ML (0.083 %) NEBULIZER SOLUTION] Take 2.5 mg by nebulization every 4 (four) hours as needed for wheezing.          aluminum-magnesium hydroxide-simethicone (MAALOX MAX STRENGTH) 400-400-40 mg/5 mL suspension   Yes No   Sig: [ALUMINUM-MAGNESIUM HYDROXIDE-SIMETHICONE (MAALOX MAX STRENGTH) 400-400-40 MG/5 ML SUSPENSION] Take 5 mL by mouth every 4 (four) hours as needed for indigestion.          ammonium lactate (AMLACTIN) 12 % cream   Yes No   Sig: [AMMONIUM LACTATE (AMLACTIN) 12 % CREAM] Apply 1 application topically 2 (two) times a day.          atropine 1 % ophthalmic solution   Yes  No   Sig: [ATROPINE 1 % OPHTHALMIC SOLUTION] Place 1-2 drops under the tongue 2 (two) times a day. Takes under tongue to stop drooling         budesonide-formoterol (SYMBICORT) 160-4.5 mcg/actuation inhaler   Yes No   Sig: [BUDESONIDE-FORMOTEROL (SYMBICORT) 160-4.5 MCG/ACTUATION INHALER] Inhale 2 puffs 2 (two) times a day.          cholecalciferol, vitamin D3, 2,000 unit Tab   Yes No   Sig: [CHOLECALCIFEROL, VITAMIN D3, 2,000 UNIT TAB] Take 2,000 Units by mouth daily.   cloZAPine (CLOZARIL) 100 MG tablet   Yes No   Sig: [CLOZAPINE (CLOZARIL) 100 MG TABLET] Take 200 mg by mouth at bedtime.          diphenhydrAMINE (BENADRYL) 25 mg capsule   Yes No   Sig: [DIPHENHYDRAMINE (BENADRYL) 25 MG CAPSULE] Take 25 mg by mouth at bedtime as needed for itching.   docusate sodium (COLACE) 100 MG capsule   Yes No   Sig: [DOCUSATE SODIUM (COLACE) 100 MG CAPSULE] Take 100 mg by mouth 2 (two) times a day.          escitalopram oxalate (LEXAPRO) 10 MG tablet   Yes No   Sig: [ESCITALOPRAM OXALATE (LEXAPRO) 10 MG TABLET] Take 10 mg by mouth daily.          famotidine (PEPCID) 40 MG tablet   Yes No   Sig: [FAMOTIDINE (PEPCID) 40 MG TABLET] Take 40 mg by mouth 2 (two) times a day.   fluoride, sodium, (DENTAGEL) 1.1 % Gel dental gel   Yes No   Sig: [FLUORIDE, SODIUM, (DENTAGEL) 1.1 % GEL DENTAL GEL] Apply 1 application to teeth at bedtime.   glucagon, human recombinant, (GLUCAGEN HYPOKIT) 1 mg injection   Yes No   Sig: [GLUCAGON, HUMAN RECOMBINANT, (GLUCAGEN HYPOKIT) 1 MG INJECTION] Infuse 1 mg into a venous catheter once as needed (Low blood sugars).          insulin aspart U-100 (NOVOLOG) 100 unit/mL injection   Yes No   Sig: [INSULIN ASPART U-100 (NOVOLOG) 100 UNIT/ML INJECTION] Inject 15 Units under the skin 3 (three) times a day before meals. Sliding scale          insulin glargine (LANTUS) 100 unit/mL injection   Yes No   Sig: [INSULIN GLARGINE (LANTUS) 100 UNIT/ML INJECTION] Inject 25 Units under the skin every morning.           ipratropium-albuterol (DUO-NEB) 0.5-2.5 mg/3 mL nebulizer   Yes No   Sig: [IPRATROPIUM-ALBUTEROL (DUO-NEB) 0.5-2.5 MG/3 ML NEBULIZER] Take 3 mL by nebulization 4 (four) times a day as needed.          lisinopril (PRINIVIL,ZESTRIL) 10 MG tablet   Yes No   Sig: [LISINOPRIL (PRINIVIL,ZESTRIL) 10 MG TABLET] Take 10 mg by mouth daily.   loratadine (CLARITIN) 10 mg tablet   Yes No   Sig: [LORATADINE (CLARITIN) 10 MG TABLET] Take 10 mg by mouth daily.   lubiprostone (AMITIZA) 24 MCG capsule   Yes No   Sig: [LUBIPROSTONE (AMITIZA) 24 MCG CAPSULE] Take 24 mcg by mouth 2 (two) times a day.   magnesium hydroxide (MILK OF MAG) 400 mg/5 mL Susp suspension   Yes No   Sig: [MAGNESIUM HYDROXIDE (MILK OF MAG) 400 MG/5 ML SUSP SUSPENSION] Take 30 mL by mouth 2 (two) times a day as needed.          mometasone (NASONEX) 50 mcg/actuation nasal spray   Yes No   Sig: [MOMETASONE (NASONEX) 50 MCG/ACTUATION NASAL SPRAY] 2 sprays into each nostril daily.   multivitamin with minerals (THERA-M) 9 mg iron-400 mcg Tab tablet   Yes No   Sig: [MULTIVITAMIN WITH MINERALS (THERA-M) 9 MG IRON-400 MCG TAB TABLET] Take 1 tablet by mouth daily.   nicotine (NICODERM CQ) 21 mg/24 hr   Yes No   Sig: [NICOTINE (NICODERM CQ) 21 MG/24 HR] Place 1 patch on the skin daily as needed for smoking cessation.          oxyCODONE-acetaminophen (PERCOCET/ENDOCET) 5-325 mg per tablet   No No   Sig: [OXYCODONE-ACETAMINOPHEN (PERCOCET/ENDOCET) 5-325 MG PER TABLET] Take 1 tablet by mouth every 6 (six) hours as needed for pain.   polyethylene glycol (MIRALAX) 17 gram packet   Yes No   Sig: [POLYETHYLENE GLYCOL (MIRALAX) 17 GRAM PACKET] Take 17 g by mouth 2 (two) times a day.          pravastatin (PRAVACHOL) 40 MG tablet   Yes No   Sig: [PRAVASTATIN (PRAVACHOL) 40 MG TABLET] Take 40 mg by mouth every morning.          ramelteon (ROZEREM) 8 mg tablet   Yes No   Sig: [RAMELTEON (ROZEREM) 8 MG TABLET] Take 8 mg by mouth at bedtime.   topiramate (TOPAMAX) 200 MG tablet   Yes  No   Sig: [TOPIRAMATE (TOPAMAX) 200 MG TABLET] Take 200 mg by mouth 2 (two) times a day.   triamcinolone (KENALOG) 0.1 % ointment   Yes No   Sig: [TRIAMCINOLONE (KENALOG) 0.1 % OINTMENT] Apply 1 application topically 2 (two) times a day.   trimethobenzamide (TIGAN) 300 mg capsule   Yes No   Sig: [TRIMETHOBENZAMIDE (TIGAN) 300 MG CAPSULE] Take 300 mg by mouth 3 (three) times a day as needed (N/V).      Facility-Administered Medications: None        Physical Exam   Vital Signs:     BP: (!) 147/68 Pulse: 91   Resp: 26 SpO2: 92 % O2 Device: Simple face mask Oxygen Delivery: 4 LPM  Weight: 143 lbs 1.26 oz    General: No acute distress, sleeping without oxygen mask  CV: Regular rate and rhythm, normal S1-S2  Lungs: Expiratory wheezes bilaterally  Abdomen: Soft, nontender  Extremities: No edema    Medical Decision Making       65 MINUTES SPENT BY ME on the date of service doing chart review, history, exam, documentation & further activities per the note.  MANAGEMENT DISCUSSED with the following over the past 24 hours: Patient and nursing staff       Data     I have personally reviewed the following data over the past 24 hrs:    16.6 (H)  \   14.1   / 222     136 103 8.4 /  196 (H)   3.2 (L) 24 0.67 \       Trop: 12 BNP: 101       Imaging results reviewed over the past 24 hrs:   Recent Results (from the past 24 hour(s))   XR Chest Port 1 View    Narrative    EXAM: XR CHEST PORT 1 VIEW  LOCATION: [facility]  DATE/TIME: 6/3/2023 11:08 PM CDT    INDICATION: Dyspnea, productive cough  COMPARISON: 05/02/2023      Impression    IMPRESSION:   EKG wires and leads projecting over the chest obscures some details. Heart size and pulmonary vascularity within normal limits. Mild opacity projecting over the left lower lung may represent superimposed soft tissues. No other infiltrates   seen. Osseous structures grossly intact. Visualized upper abdomen unremarkable.

## 2023-06-04 NOTE — DISCHARGE SUMMARY
Patient admitted this morning by my colleague for COPD/asthma flare up. Patient left AMA even before I could seen the patient on my rounds.

## 2023-06-04 NOTE — ED PROVIDER NOTES
EMERGENCY DEPARTMENT ENCOUNTER      NAME: Rukhsana Griffith  AGE: 62 year old female  YOB: 1960  MRN: [id number]  EVALUATION DATE & TIME: 6/3/2023 10:13 PM    PCP: Kei De Leon    ED PROVIDER: Domingo Gimenez D.O.      Chief Complaint   Patient presents with     Shortness of Breath       FINAL IMPRESSION:  1. COPD exacerbation (H)        ED COURSE & MEDICAL DECISION MAKING:    10:12 PM I met with the patient to gather history and to perform my initial exam. I discussed the plan for care while in the Emergency Department.  10:45 PM Rechecked and updated patient.   12:39 AM Spoke with the Hospitalist, Dr. Colin who accepts the patient for admission.        Pertinent Labs & Imaging studies reviewed. (See chart for details)    62 year old female presents to the Emergency Department for evaluation of shortness of breath.  Patient was quite diminished initial exam.  Initial concern was for pneumonia versus pneumothorax versus COPD exacerbation versus other emergent process.  On imaging, there is no obvious pneumothorax or pneumonia, potential bronchitis, but otherwise unremarkable.  Exam did show significant diminished breath sounds bilaterally, did have some improvement with work of breathing and his other symptoms with neb treatments.  Also was given Solu-Medrol.  At this time, based on clinical presentation, known history of COPD, and findings on imaging and lab testing, suspect likely COPD exacerbation and potentially secondary to infectious process.  Patient will be admitted for further management.  Discussed with the hospitalist who agreed to admission.    Medical Decision Making    History:    Supplemental history from: Documented in chart, if applicable    External Record(s) reviewed: Documented in chart, if applicable.    Work Up:    Chart documentation includes differential considered and any EKGs or imaging independently interpreted by provider, where specified.    In additional to  work up documented, I considered the following work up: Documented in chart, if applicable.    External consultation:    Discussion of management with another provider: Documented in chart, if applicable    Complicating factors:    Care impacted by chronic illness: Chronic Lung Disease, Chronic Pain, Diabetes, Hyperlipidemia, Hypertension and Mental Health    Care affected by social determinants of health: N/A    Disposition considerations: Admit.        At the conclusion of the encounter I discussed the results of all of the tests and the disposition. The questions were answered. The patient or family acknowledged understanding and was agreeable with the care plan.      CRITICAL CARE:  Critical Care  Performed by: ELLEN GARCIA  Authorized by: ELLEN GARCIA  Total critical care time: 30 minutes  Critical care time was exclusive of separately billable procedures and treating other patients.  Critical care was necessary to treat or prevent imminent or life-threatening deterioration of the following conditions: COPD exacerbation  Critical care was time spent personally by me on the following activities: development of treatment plan with patient or surrogate, discussions with consultants, examination of patient, evaluation of patient's response to treatment, obtaining history from patient or surrogate, ordering and performing treatments and interventions, ordering and review of laboratory studies, ordering and review of radiographic studies and re-evaluation of patient's condition, this excludes any separately billable procedures.          HPI    Patient information was obtained from: Patient    Use of : N/A     Rukhsana Griffith is a 62 year old female with a pertinent medical history of COPD, asthma, calcified granuloma of lung, T2DM, HLD, HTN, chronic abdominal pain, chronic hyponatremia, anemia, osteoporosis, seizure disorder, refractory epilepsy, tobacco abuse, anxiety methamphetamine abuse,  psychoactive substance dependence, schizoaffective disorder, who presents to the ED for evaluation of shortness of breath.    Per EMS, patient with PMH of DM and asthma called them for recently having low blood sugar, down to the 50s, however upon arrival patient's blood sugar was 137. Patient then had a discussion with them about also recently having a productive cough and shortness of breath, and she noted she was out of her nebulizer. Initially patient's oxygen saturation levels were  and vital signs were stable, however, patient then took approximately 5 steps and became extremely dyspneic and tachypneic and she developed dizziness and left-sided chest pain. Patient's O2 levels dropped to the mid-80s. Patient was provided 1 duoneb en route to the ED as well as high flow O2 and patient's O2 levels kyara to 89, and then upon arrival patient's O2 level was 96.    Patient endorses the history above and mentions that she has been having gradually worsening productive cough and shortness of breath for approximately 3 days. She also endorses recently having intermittent subjective fever and chills, but she denies any recent measured fevers. She endorses shortness of breath, light-headedness, and lower left chest pain within the ED. She endorses being hospitalized in the past due to asthma. She endorses regularly smoking, but she denies smoking today. She endorses a PMH of DM and HTN. She endorses undergoing a gastric bypass in the past. She denies any recent nausea, vomiting, abdominal pain, or any other complications at this time.     Per Chart Review: Patient was seen at the [facility] ED on 05/01/23 for evaluation of 5 days of cough, congestion, green sputum and rhinorrhea.  Patient also reported chest pain, worse with cough, as well as recent shortness of breath. On exam she was nontoxic, afebrile with intact pulses and no calf tenderness/swelling. Chest pain was all reproducible  with cough and palpation. EKG was non-ischemic and unremarkable.   Sodium 132, glucose 272,alkaline phosphatase 141, WBC 13.9, absolute neutrophils 8.7, and absolute 1.7, all labs otherwise unremarkable. CXR conveyed: 1. No evidence of active cardiopulmonary disease. 2. Hyperinflated lungs, suggestive of chronic obstructive pulmonary disease. Patient was discharged in stable condition.      REVIEW OF SYSTEMS  Constitutional:  Positive for fever (subjective, but not measured). Positive for chills. Denies weight loss or weakness  Eyes:  No pain, discharge, redness  HENT:  Denies sore throat, ear pain, congestion  Respiratory: Positive for cough (productive). Positive for shortness of breath.   Cardiovascular:  Positive for chest pain. Denies palpitations  GI:  Denies abdominal pain, nausea, vomiting, diarrhea  : Denies dysuria, hematuria  Musculoskeletal:  Denies any new muscle/joint pain, swelling or loss of function.  Skin:  Denies rash, pallor  Neurologic:  Positive for light-headedness. Denies headache.   Lymph: Denies swollen nodes    All other systems negative unless noted in HPI.    PAST MEDICAL HISTORY:  Past Medical History:   Diagnosis Date     COPD (chronic obstructive pulmonary disease) (H)      Diabetes (H)      HLD (hyperlipidemia)      Methamphetamine abuse (H)      Schizoaffective disorder (H)      Seizures (H)        PAST SURGICAL HISTORY:  Past Surgical History:   Procedure Laterality Date     HYSTERECTOMY       KNEE SURGERY       OTHER SURGICAL HISTORY      gastric pacemaker     OTHER SURGICAL HISTORY      interstim placement     NE REVISE/REMOVE PERIPH/GASTRIC NEUROSTIM/ N/A 5/15/2019    Procedure: PARTIAL REMOVAL OLD LEAD;  Surgeon: Jcarlos Muñiz MD;  Location: [facility];  Service: Urology     RELEASE CARPAL TUNNEL           CURRENT MEDICATIONS:    No current facility-administered medications for this encounter.     Current Outpatient Medications   Medication      acetaminophen (TYLENOL) 160 mg/5 mL (5 mL) Soln solution     albuterol (PROAIR HFA;PROVENTIL HFA;VENTOLIN HFA) 90 mcg/actuation inhaler     albuterol (PROVENTIL) 2.5 mg /3 mL (0.083 %) nebulizer solution     aluminum-magnesium hydroxide-simethicone (MAALOX MAX STRENGTH) 400-400-40 mg/5 mL suspension     ammonium lactate (AMLACTIN) 12 % cream     atropine 1 % ophthalmic solution     budesonide-formoterol (SYMBICORT) 160-4.5 mcg/actuation inhaler     cholecalciferol, vitamin D3, 2,000 unit Tab     cloZAPine (CLOZARIL) 100 MG tablet     diphenhydrAMINE (BENADRYL) 25 mg capsule     docusate sodium (COLACE) 100 MG capsule     EPINEPHrine (EPIPEN) 0.3 mg/0.3 mL atIn     escitalopram oxalate (LEXAPRO) 10 MG tablet     famotidine (PEPCID) 40 MG tablet     fluoride, sodium, (DENTAGEL) 1.1 % Gel dental gel     glucagon, human recombinant, (GLUCAGEN HYPOKIT) 1 mg injection     insulin aspart U-100 (NOVOLOG) 100 unit/mL injection     insulin glargine (LANTUS) 100 unit/mL injection     ipratropium-albuterol (DUO-NEB) 0.5-2.5 mg/3 mL nebulizer     lisinopril (PRINIVIL,ZESTRIL) 10 MG tablet     loratadine (CLARITIN) 10 mg tablet     lubiprostone (AMITIZA) 24 MCG capsule     magnesium hydroxide (MILK OF MAG) 400 mg/5 mL Susp suspension     mometasone (NASONEX) 50 mcg/actuation nasal spray     multivitamin with minerals (THERA-M) 9 mg iron-400 mcg Tab tablet     nicotine (NICODERM CQ) 21 mg/24 hr     oxyCODONE-acetaminophen (PERCOCET/ENDOCET) 5-325 mg per tablet     polyethylene glycol (MIRALAX) 17 gram packet     pravastatin (PRAVACHOL) 40 MG tablet     ramelteon (ROZEREM) 8 mg tablet     SUMAtriptan (IMITREX) 100 MG tablet     topiramate (TOPAMAX) 200 MG tablet     triamcinolone (KENALOG) 0.1 % ointment     trimethobenzamide (TIGAN) 300 mg capsule         ALLERGIES:  Allergies   Allergen Reactions     Aspirin Unknown     Other reaction(s): Bleeding     Bee Sting Kit [Bee Venom] Anaphylaxis     Botox [Onabotulinumtoxina] Hives and  "Shortness Of Breath     Garlic Hives     Hydrocodone Headache, Hives and Itching     Hydromorphone Other (See Comments) and Rash     Seizures     Hydroxyzine Hives, Other (See Comments), Anaphylaxis and Shortness Of Breath     Dyspnea     Hymenoptera Allergenic Extract [Wasp Venom Protein] Anaphylaxis     Iodinated Contrast Media Hives, Other (See Comments), Itching and Swelling     EDEMA     Latex Shortness Of Breath     Lidocaine Hives     lidoderm patch only; tolerates injectable lidocaine     Onion Hives     green, red, yellow, mushrooms, garlic---all cause pt to have hives     Oxycodone Hives and Rash     Procaine Hives     tolerates injectable bupivacaine and lidocaine       Sucralose Anaphylaxis     Diazepam Rash     also: parasomnia, as pt reported waking up in bathtub     Haloperidol Rash     Ketorolac Rash     Lithium Rash     Meperidine Rash     Diatrizoate Meglumine [Diatrizoate] Hives and Swelling     Green Pepper [Capsicum] Hives     Metformin Other (See Comments)     \"Stomach bleeding and kidney infection\"     Adhesive Tape-Silicones [Adhesive Tape] Rash     [place] [Nuts] Rash     Cephalosporins Rash     Chlorpromazine Rash     THORAZINE     Chlorpropamide Rash     Coconut (Cocos Nucifera) Rash     Codeine Headache     Migraines        Darvocet A500 [Propoxyphene N-Apap] Rash     Dipyridamole Rash     Fentanyl Rash     Fluoxetine Rash     Furosemide Rash     Glyburide Other (See Comments) and Rash     Other reaction(s): hypoglycemia (patient is very sensitive to sulfonylureas)     Ondansetron Rash     Penicillins Rash and Other (See Comments)     Migraines, dizzy and sweating     Shellfish Containing Products [Shellfish-Derived Products] Rash     Sulfa (Sulfonamide Antibiotics) [Sulfa Antibiotics] Rash     Thallium-201 [Thallous Chloride Tl 201] Rash     Thioridazine Rash     Tizanidine Rash       FAMILY HISTORY:  Family History   Adopted: Yes       SOCIAL HISTORY:  Social History     Socioeconomic " History     Marital status:    Tobacco Use     Smoking status: Every Day     Packs/day: 0.25     Types: Cigarettes     Smokeless tobacco: Never   Substance and Sexual Activity     Alcohol use: Not Currently     Drug use: Not Currently       VITALS:  Patient Vitals for the past 24 hrs:   BP Pulse Resp SpO2   06/03/23 2345 128/55 95 25 90 %   06/03/23 2330 129/60 96 24 91 %   06/03/23 2315 131/63 96 24 90 %   06/03/23 2245 (!) 150/70 100 27 94 %   06/03/23 2231 (!) 155/85 93 22 99 %       PHYSICAL EXAM    VITAL SIGNS: /55   Pulse 95   Resp 25   SpO2 90%     General Appearance: Well-appearing, well-nourished, no acute distress   Head:  Normocephalic, without obvious abnormality, atraumatic  Eyes:  PERRL, conjunctiva/corneas clear, EOM's intact,  ENT:  Lips, mucosa, and tongue normal, membranes are moist without pallor  Neck:  Normal ROM, symmetrical, trachea midline    Cardio:  Borderline tachycardic. Regular rhythm, no murmur, rub or gallop, 2+ pulses symmetric in all extremities  Pulm:  Diminished breath sounds throughout. Moderately increased work and rate of breathing.   Abdomen:  Soft, non-tender, no rebound or guarding.  Musculoskeletal: Full ROM, no edema, no cyanosis, good ROM of major joints  Integument:  Warm, Dry, No erythema, No rash.    Neurologic:  Alert & oriented.  No focal deficits appreciated.  Ambulatory.  Psychiatric:  Affect normal, Judgment normal, Mood normal.      LABS  Results for orders placed or performed during the hospital encounter of 06/03/23 (from the past 24 hour(s))   CBC with platelets + differential    Narrative    The following orders were created for panel order CBC with platelets + differential.  Procedure                               Abnormality         Status                     ---------                               -----------         ------                     CBC with platelets and d...[[id number]]  Abnormal            Final result                 Please  view results for these tests on the individual orders.   Basic metabolic panel   Result Value Ref Range    Sodium 136 136 - 145 mmol/L    Potassium 3.2 (L) 3.4 - 5.3 mmol/L    Chloride 103 98 - 107 mmol/L    Carbon Dioxide (CO2) 24 22 - 29 mmol/L    Anion Gap 9 7 - 15 mmol/L    Urea Nitrogen 8.4 8.0 - 23.0 mg/dL    Creatinine 0.67 0.51 - 0.95 mg/dL    Calcium 10.5 (H) 8.8 - 10.2 mg/dL    Glucose 124 (H) 70 - 99 mg/dL    GFR Estimate >90 >60 mL/min/1.73m2   Troponin T, High Sensitivity (now)   Result Value Ref Range    Troponin T, High Sensitivity 12 <=14 ng/L   Nt probnp inpatient   Result Value Ref Range    N terminal Pro BNP Inpatient 101 0 - 900 pg/mL   CBC with platelets and differential   Result Value Ref Range    WBC Count 16.6 (H) 4.0 - 11.0 10e3/uL    RBC Count 4.57 3.80 - 5.20 10e6/uL    Hemoglobin 14.1 11.7 - 15.7 g/dL    Hematocrit 43.5 35.0 - 47.0 %    MCV 95 78 - 100 fL    MCH 30.9 26.5 - 33.0 pg    MCHC 32.4 31.5 - 36.5 g/dL    RDW 14.3 10.0 - 15.0 %    Platelet Count 222 150 - 450 10e3/uL    % Neutrophils 66 %    % Lymphocytes 20 %    % Monocytes 11 %    % Eosinophils 1 %    % Basophils 1 %    % Immature Granulocytes 1 %    NRBCs per 100 WBC 0 <1 /100    Absolute Neutrophils 11.0 (H) 1.6 - 8.3 10e3/uL    Absolute Lymphocytes 3.3 0.8 - 5.3 10e3/uL    Absolute Monocytes 1.9 (H) 0.0 - 1.3 10e3/uL    Absolute Eosinophils 0.2 0.0 - 0.7 10e3/uL    Absolute Basophils 0.1 0.0 - 0.2 10e3/uL    Absolute Immature Granulocytes 0.1 <=0.4 10e3/uL    Absolute NRBCs 0.0 10e3/uL   XR Chest Port 1 View    Narrative    EXAM: XR CHEST PORT 1 VIEW  LOCATION: [facility]  DATE/TIME: 6/3/2023 11:08 PM CDT    INDICATION: Dyspnea, productive cough  COMPARISON: 05/02/2023      Impression    IMPRESSION:   EKG wires and leads projecting over the chest obscures some details. Heart size and pulmonary vascularity within normal limits. Mild opacity projecting over the left lower lung may represent  superimposed soft tissues. No other infiltrates   seen. Osseous structures grossly intact. Visualized upper abdomen unremarkable.   Glucose by meter   Result Value Ref Range    GLUCOSE BY METER POCT 196 (H) 70 - 99 mg/dL         RADIOLOGY  XR Chest Port 1 View   Final Result   IMPRESSION:   EKG wires and leads projecting over the chest obscures some details. Heart size and pulmonary vascularity within normal limits. Mild opacity projecting over the left lower lung may represent superimposed soft tissues. No other infiltrates    seen. Osseous structures grossly intact. Visualized upper abdomen unremarkable.           EKG:    Rate: 95 bpm  Rhythm: Normal Sinus Rhythm  Axis: Right  Interval: Normal  Conduction: Normal  QRS: Normal  ST: Normal  T-wave: Normal  QT: Not prolonged  Comparison EKG: no significant change compared to 1 May 2023  Impression:  No acute ischemic change   I have independently reviewed and interpreted today's EKG, pending Cardiologist read    PROCEDURES:  None.    MEDICATIONS GIVEN IN THE EMERGENCY:  Medications   ipratropium - albuterol 0.5 mg/2.5 mg/3 mL (DUONEB) neb solution 3 mL (3 mLs Nebulization $Given 6/3/23 2225)   albuterol (PROVENTIL) neb solution 5 mg (5 mg Nebulization $Given 6/3/23 2225)   methylPREDNISolone sodium succinate (solu-MEDROL) injection 125 mg (125 mg Intravenous $Given 6/3/23 2233)       NEW PRESCRIPTIONS STARTED AT TODAY'S ER VISIT  New Prescriptions    No medications on file        I, Anjel Hernandez, am serving as a scribe to document services personally performed by Domingo Gimenez D.O. based on my observations and the provider's statements to me.  I,   Domingo Gimenez D.O., attest that Anjel Hernandez is acting in a scribe capacity, has observed my performance of the services and has documented them in accordance with my direction.     Domingo Gimenez D.O.  Emergency Medicine  [facility] EMERGENCY DEPARTMENT  [address]  [id number]  [phone]  Dept: [phone]       Domingo Gimenez,   06/04/23 0131       Domingo Gimenez,   06/04/23 0133

## 2023-06-04 NOTE — SIGNIFICANT EVENT
Patient demanding to see the doctor now, states she has to leave now to care for her sick cat.  Dr Coyne notified, unable to see patient now.  Explained to patient that she is requiring O2 to keep her O2 saturations above 90%, she states she is often at 87% at home. Reviewed the dangers of leaving the hospital before treatment is completed.  Patient states she understands but she is leaving anyway.  Instructed to return to hospital if dyspnea worsens.

## 2023-06-04 NOTE — ED TRIAGE NOTES
Pt BIB [facility] from home. Pt states that she has been SOB today. History of Asthma and is out of her nebs. Pt was given 1 duoneb  Enroute. Pt arrived on NRB.

## 2023-06-04 NOTE — ED NOTES
Bed: JNED-02  Expected date: 6/3/23  Expected time: 9:51 PM  Means of arrival: Ambulance  Comments:  Bernice SANDOVAL sob- 89% on neb and 6L- RT called

## 2023-06-04 NOTE — ED NOTES
Bed: [place] [address]  Expected date:   Expected time:   Means of arrival:   Comments:  [facility] Room 2

## 2023-06-04 NOTE — PROGRESS NOTES
[facility] Treatment Plan    Patient Score: 12  Patient Acuity: 3    Clinical Indication for Therapy: Asthma/COPD, Acute bronchitis     Therapy Ordered: Atrovent and Xopenex QID    Assessment Summary:   Respiratory assess and treat is complete. Pt is on 4L OxyMask with expiratory wheezes BS. Pt has weak non productive cough. CXR results were unremarkable. pt will benefit from scheduled nebs and will re-evaluate after 72hrs.     Josias Martinez, RT

## 2023-06-05 ENCOUNTER — PATIENT OUTREACH (OUTPATIENT)
Dept: CARE COORDINATION | Facility: CLINIC | Age: 63
End: 2023-06-05
Payer: MEDICAID

## 2023-06-05 NOTE — PROGRESS NOTES
[facility]    Background: Transitional Care Management program identified per system criteria and reviewed by [facility] Care Resource Center team for possible outreach.    Assessment: Upon chart review, [facility] Team member will not proceed with patient outreach related to this episode of Transitional Care Management program due to reason below:    Patient declined to answer all post hospital discharge questions with CCRC team member and disconnected call.    Plan: Transitional Care Management episode addressed appropriately per reason noted above.      Patient was upset with one of their nurses while in the hospital. They expressed concerns on the phone and I was unable to get any answers to post hospital follow up questions. I did give the patient the line to our patient relations.    Angelica Holloway  Community Health Worker  [facility] [place]  [phone]      *Connected Care Resource Team does NOT follow patient ongoing. Referrals are identified based on internal discharge reports and the outreach is to ensure patient has an understanding of their discharge instructions.

## 2023-06-06 LAB
ATRIAL RATE - MUSE: 95 BPM
DIASTOLIC BLOOD PRESSURE - MUSE: 75 MMHG
INTERPRETATION ECG - MUSE: NORMAL
P AXIS - MUSE: 85 DEGREES
PR INTERVAL - MUSE: 206 MS
QRS DURATION - MUSE: 82 MS
QT - MUSE: 356 MS
QTC - MUSE: 447 MS
R AXIS - MUSE: 92 DEGREES
SYSTOLIC BLOOD PRESSURE - MUSE: 170 MMHG
T AXIS - MUSE: 88 DEGREES
VENTRICULAR RATE- MUSE: 95 BPM

## 2023-06-23 ENCOUNTER — HOSPITAL ENCOUNTER (INPATIENT)
Facility: HOSPITAL | Age: 63
LOS: 1 days | Discharge: LEFT BEFORE DISCHARGE | End: 2023-06-24
Attending: EMERGENCY MEDICINE | Admitting: HOSPITALIST
Payer: MEDICAID

## 2023-06-23 DIAGNOSIS — R09.02 HYPOXIA: ICD-10-CM

## 2023-06-23 DIAGNOSIS — J44.1 COPD EXACERBATION (H): ICD-10-CM

## 2023-06-23 PROCEDURE — 99285 EMERGENCY DEPT VISIT HI MDM: CPT | Mod: 25

## 2023-06-23 PROCEDURE — 93005 ELECTROCARDIOGRAM TRACING: CPT | Performed by: STUDENT IN AN ORGANIZED HEALTH CARE EDUCATION/TRAINING PROGRAM

## 2023-06-24 ENCOUNTER — APPOINTMENT (OUTPATIENT)
Dept: RADIOLOGY | Facility: HOSPITAL | Age: 63
End: 2023-06-24
Attending: EMERGENCY MEDICINE
Payer: MEDICAID

## 2023-06-24 VITALS
OXYGEN SATURATION: 89 % | HEART RATE: 95 BPM | RESPIRATION RATE: 20 BRPM | TEMPERATURE: 98.2 F | SYSTOLIC BLOOD PRESSURE: 134 MMHG | DIASTOLIC BLOOD PRESSURE: 56 MMHG

## 2023-06-24 PROBLEM — R09.02 HYPOXIA: Status: ACTIVE | Noted: 2023-06-24

## 2023-06-24 LAB
ALBUMIN SERPL BCG-MCNC: 3.6 G/DL (ref 3.5–5.2)
ALP SERPL-CCNC: 128 U/L (ref 35–104)
ALT SERPL W P-5'-P-CCNC: 17 U/L (ref 0–50)
ANION GAP SERPL CALCULATED.3IONS-SCNC: 11 MMOL/L (ref 7–15)
AST SERPL W P-5'-P-CCNC: 22 U/L (ref 0–45)
ATRIAL RATE - MUSE: 102 BPM
BASOPHILS # BLD AUTO: 0.1 10E3/UL (ref 0–0.2)
BASOPHILS NFR BLD AUTO: 1 %
BILIRUB SERPL-MCNC: 0.2 MG/DL
BUN SERPL-MCNC: 7.8 MG/DL (ref 8–23)
CALCIUM SERPL-MCNC: 10.3 MG/DL (ref 8.8–10.2)
CHLORIDE SERPL-SCNC: 100 MMOL/L (ref 98–107)
CREAT SERPL-MCNC: 0.58 MG/DL (ref 0.51–0.95)
CRP SERPL-MCNC: 74.6 MG/L
DEPRECATED HCO3 PLAS-SCNC: 22 MMOL/L (ref 22–29)
DIASTOLIC BLOOD PRESSURE - MUSE: NORMAL MMHG
EOSINOPHIL # BLD AUTO: 0.2 10E3/UL (ref 0–0.7)
EOSINOPHIL NFR BLD AUTO: 1 %
ERYTHROCYTE [DISTWIDTH] IN BLOOD BY AUTOMATED COUNT: 14.3 % (ref 10–15)
GFR SERPL CREATININE-BSD FRML MDRD: >90 ML/MIN/1.73M2
GLUCOSE SERPL-MCNC: 184 MG/DL (ref 70–99)
HCT VFR BLD AUTO: 42.6 % (ref 35–47)
HGB BLD-MCNC: 14.1 G/DL (ref 11.7–15.7)
HOLD SPECIMEN: NORMAL
HOLD SPECIMEN: NORMAL
IMM GRANULOCYTES # BLD: 0.1 10E3/UL
IMM GRANULOCYTES NFR BLD: 1 %
INTERPRETATION ECG - MUSE: NORMAL
LACTATE SERPL-SCNC: 0.7 MMOL/L (ref 0.7–2)
LIPASE SERPL-CCNC: 12 U/L (ref 13–60)
LYMPHOCYTES # BLD AUTO: 3 10E3/UL (ref 0.8–5.3)
LYMPHOCYTES NFR BLD AUTO: 21 %
MAGNESIUM SERPL-MCNC: 1.8 MG/DL (ref 1.7–2.3)
MCH RBC QN AUTO: 31 PG (ref 26.5–33)
MCHC RBC AUTO-ENTMCNC: 33.1 G/DL (ref 31.5–36.5)
MCV RBC AUTO: 94 FL (ref 78–100)
MONOCYTES # BLD AUTO: 1.7 10E3/UL (ref 0–1.3)
MONOCYTES NFR BLD AUTO: 12 %
NEUTROPHILS # BLD AUTO: 9.4 10E3/UL (ref 1.6–8.3)
NEUTROPHILS NFR BLD AUTO: 64 %
NRBC # BLD AUTO: 0 10E3/UL
NRBC BLD AUTO-RTO: 0 /100
NT-PROBNP SERPL-MCNC: 92 PG/ML (ref 0–900)
P AXIS - MUSE: 74 DEGREES
PLATELET # BLD AUTO: 210 10E3/UL (ref 150–450)
POTASSIUM SERPL-SCNC: 3.4 MMOL/L (ref 3.4–5.3)
PR INTERVAL - MUSE: 200 MS
PROCALCITONIN SERPL IA-MCNC: 0.05 NG/ML
PROT SERPL-MCNC: 7.1 G/DL (ref 6.4–8.3)
QRS DURATION - MUSE: 80 MS
QT - MUSE: 364 MS
QTC - MUSE: 474 MS
R AXIS - MUSE: 93 DEGREES
RBC # BLD AUTO: 4.55 10E6/UL (ref 3.8–5.2)
SODIUM SERPL-SCNC: 133 MMOL/L (ref 136–145)
SYSTOLIC BLOOD PRESSURE - MUSE: NORMAL MMHG
T AXIS - MUSE: 69 DEGREES
VENTRICULAR RATE- MUSE: 102 BPM
WBC # BLD AUTO: 14.5 10E3/UL (ref 4–11)

## 2023-06-24 PROCEDURE — 250N000011 HC RX IP 250 OP 636: Mod: JZ | Performed by: EMERGENCY MEDICINE

## 2023-06-24 PROCEDURE — 250N000009 HC RX 250: Performed by: EMERGENCY MEDICINE

## 2023-06-24 PROCEDURE — 87040 BLOOD CULTURE FOR BACTERIA: CPT | Performed by: EMERGENCY MEDICINE

## 2023-06-24 PROCEDURE — 80053 COMPREHEN METABOLIC PANEL: CPT | Performed by: EMERGENCY MEDICINE

## 2023-06-24 PROCEDURE — 83605 ASSAY OF LACTIC ACID: CPT | Performed by: EMERGENCY MEDICINE

## 2023-06-24 PROCEDURE — 83690 ASSAY OF LIPASE: CPT | Performed by: EMERGENCY MEDICINE

## 2023-06-24 PROCEDURE — 85025 COMPLETE CBC W/AUTO DIFF WBC: CPT | Performed by: EMERGENCY MEDICINE

## 2023-06-24 PROCEDURE — 83735 ASSAY OF MAGNESIUM: CPT | Performed by: EMERGENCY MEDICINE

## 2023-06-24 PROCEDURE — 36415 COLL VENOUS BLD VENIPUNCTURE: CPT | Performed by: EMERGENCY MEDICINE

## 2023-06-24 PROCEDURE — 120N000001 HC R&B MED SURG/OB

## 2023-06-24 PROCEDURE — 84145 PROCALCITONIN (PCT): CPT | Performed by: EMERGENCY MEDICINE

## 2023-06-24 PROCEDURE — 71045 X-RAY EXAM CHEST 1 VIEW: CPT

## 2023-06-24 PROCEDURE — 86140 C-REACTIVE PROTEIN: CPT | Performed by: EMERGENCY MEDICINE

## 2023-06-24 PROCEDURE — 83880 ASSAY OF NATRIURETIC PEPTIDE: CPT | Performed by: EMERGENCY MEDICINE

## 2023-06-24 PROCEDURE — 96374 THER/PROPH/DIAG INJ IV PUSH: CPT

## 2023-06-24 PROCEDURE — 258N000003 HC RX IP 258 OP 636: Performed by: EMERGENCY MEDICINE

## 2023-06-24 PROCEDURE — 94640 AIRWAY INHALATION TREATMENT: CPT

## 2023-06-24 PROCEDURE — 99222 1ST HOSP IP/OBS MODERATE 55: CPT | Performed by: HOSPITALIST

## 2023-06-24 PROCEDURE — 96361 HYDRATE IV INFUSION ADD-ON: CPT

## 2023-06-24 RX ORDER — ALBUTEROL SULFATE 90 UG/1
2 AEROSOL, METERED RESPIRATORY (INHALATION) EVERY 6 HOURS PRN
Status: DISCONTINUED | OUTPATIENT
Start: 2023-06-24 | End: 2023-06-24 | Stop reason: HOSPADM

## 2023-06-24 RX ORDER — METHYLPREDNISOLONE SODIUM SUCCINATE 125 MG/2ML
125 INJECTION, POWDER, LYOPHILIZED, FOR SOLUTION INTRAMUSCULAR; INTRAVENOUS ONCE
Status: COMPLETED | OUTPATIENT
Start: 2023-06-24 | End: 2023-06-24

## 2023-06-24 RX ORDER — CLOZAPINE 100 MG/1
200 TABLET ORAL AT BEDTIME
Status: DISCONTINUED | OUTPATIENT
Start: 2023-06-24 | End: 2023-06-24 | Stop reason: HOSPADM

## 2023-06-24 RX ORDER — TOPIRAMATE 100 MG/1
200 TABLET, FILM COATED ORAL 2 TIMES DAILY
Status: DISCONTINUED | OUTPATIENT
Start: 2023-06-24 | End: 2023-06-24 | Stop reason: HOSPADM

## 2023-06-24 RX ORDER — RAMELTEON 8 MG/1
8 TABLET ORAL AT BEDTIME
Status: DISCONTINUED | OUTPATIENT
Start: 2023-06-24 | End: 2023-06-24 | Stop reason: HOSPADM

## 2023-06-24 RX ORDER — ACETAMINOPHEN 325 MG/1
650 TABLET ORAL EVERY 6 HOURS PRN
Status: DISCONTINUED | OUTPATIENT
Start: 2023-06-24 | End: 2023-06-24 | Stop reason: HOSPADM

## 2023-06-24 RX ORDER — TRIMETHOBENZAMIDE HYDROCHLORIDE 300 MG/1
300 CAPSULE ORAL 3 TIMES DAILY PRN
Status: DISCONTINUED | OUTPATIENT
Start: 2023-06-24 | End: 2023-06-24 | Stop reason: HOSPADM

## 2023-06-24 RX ORDER — FLUTICASONE FUROATE AND VILANTEROL 200; 25 UG/1; UG/1
1 POWDER RESPIRATORY (INHALATION) DAILY
Status: DISCONTINUED | OUTPATIENT
Start: 2023-06-24 | End: 2023-06-24 | Stop reason: HOSPADM

## 2023-06-24 RX ORDER — NICOTINE 21 MG/24HR
1 PATCH, TRANSDERMAL 24 HOURS TRANSDERMAL DAILY
Status: DISCONTINUED | OUTPATIENT
Start: 2023-06-24 | End: 2023-06-24 | Stop reason: HOSPADM

## 2023-06-24 RX ORDER — OXYCODONE AND ACETAMINOPHEN 5; 325 MG/1; MG/1
1 TABLET ORAL EVERY 8 HOURS PRN
Status: DISCONTINUED | OUTPATIENT
Start: 2023-06-24 | End: 2023-06-24 | Stop reason: HOSPADM

## 2023-06-24 RX ORDER — LISINOPRIL 5 MG/1
10 TABLET ORAL DAILY
Status: DISCONTINUED | OUTPATIENT
Start: 2023-06-24 | End: 2023-06-24 | Stop reason: HOSPADM

## 2023-06-24 RX ORDER — ESCITALOPRAM OXALATE 10 MG/1
10 TABLET ORAL DAILY
Status: DISCONTINUED | OUTPATIENT
Start: 2023-06-24 | End: 2023-06-24 | Stop reason: HOSPADM

## 2023-06-24 RX ORDER — ALBUTEROL SULFATE 0.83 MG/ML
2.5 SOLUTION RESPIRATORY (INHALATION)
Status: DISCONTINUED | OUTPATIENT
Start: 2023-06-24 | End: 2023-06-24 | Stop reason: HOSPADM

## 2023-06-24 RX ORDER — LIDOCAINE 40 MG/G
CREAM TOPICAL
Status: DISCONTINUED | OUTPATIENT
Start: 2023-06-24 | End: 2023-06-24 | Stop reason: HOSPADM

## 2023-06-24 RX ORDER — IPRATROPIUM BROMIDE AND ALBUTEROL SULFATE 2.5; .5 MG/3ML; MG/3ML
3 SOLUTION RESPIRATORY (INHALATION) ONCE
Status: COMPLETED | OUTPATIENT
Start: 2023-06-24 | End: 2023-06-24

## 2023-06-24 RX ORDER — DIPHENHYDRAMINE HYDROCHLORIDE 50 MG/ML
25 INJECTION INTRAMUSCULAR; INTRAVENOUS EVERY 6 HOURS PRN
Status: DISCONTINUED | OUTPATIENT
Start: 2023-06-24 | End: 2023-06-24 | Stop reason: HOSPADM

## 2023-06-24 RX ORDER — FAMOTIDINE 20 MG/1
40 TABLET, FILM COATED ORAL 2 TIMES DAILY
Status: DISCONTINUED | OUTPATIENT
Start: 2023-06-24 | End: 2023-06-24 | Stop reason: HOSPADM

## 2023-06-24 RX ORDER — PRAVASTATIN SODIUM 20 MG
40 TABLET ORAL EVERY MORNING
Status: DISCONTINUED | OUTPATIENT
Start: 2023-06-24 | End: 2023-06-24 | Stop reason: HOSPADM

## 2023-06-24 RX ORDER — LEVOFLOXACIN 5 MG/ML
750 INJECTION, SOLUTION INTRAVENOUS ONCE
Status: COMPLETED | OUTPATIENT
Start: 2023-06-24 | End: 2023-06-24

## 2023-06-24 RX ORDER — ACETAMINOPHEN 650 MG/1
650 SUPPOSITORY RECTAL EVERY 6 HOURS PRN
Status: DISCONTINUED | OUTPATIENT
Start: 2023-06-24 | End: 2023-06-24 | Stop reason: HOSPADM

## 2023-06-24 RX ADMIN — SODIUM CHLORIDE 500 ML: 9 INJECTION, SOLUTION INTRAVENOUS at 01:11

## 2023-06-24 RX ADMIN — IPRATROPIUM BROMIDE AND ALBUTEROL SULFATE 3 ML: .5; 3 SOLUTION RESPIRATORY (INHALATION) at 00:47

## 2023-06-24 RX ADMIN — LEVOFLOXACIN 750 MG: 5 INJECTION, SOLUTION INTRAVENOUS at 01:53

## 2023-06-24 RX ADMIN — IPRATROPIUM BROMIDE AND ALBUTEROL SULFATE 3 ML: 2.5; .5 SOLUTION RESPIRATORY (INHALATION) at 01:49

## 2023-06-24 RX ADMIN — METHYLPREDNISOLONE SODIUM SUCCINATE 125 MG: 125 INJECTION, POWDER, FOR SOLUTION INTRAMUSCULAR; INTRAVENOUS at 01:11

## 2023-06-24 ASSESSMENT — ACTIVITIES OF DAILY LIVING (ADL)
ADLS_ACUITY_SCORE: 35

## 2023-06-24 ASSESSMENT — ENCOUNTER SYMPTOMS
FATIGUE: 1
CHILLS: 1
SHORTNESS OF BREATH: 1
ABDOMINAL PAIN: 1
WEAKNESS: 1
CHEST TIGHTNESS: 1
VOMITING: 1
FEVER: 1
CHOKING: 1

## 2023-06-24 NOTE — ED NOTES
Pt  informed writer that she does not want to be admitted. Pt wants to go home. Dr. Melissa was informed of this and talked to the pt.

## 2023-06-24 NOTE — ED TRIAGE NOTES
Pt arrives by [facility] for cough x5 days with green sputum. Duoneb given in route. Breath sounds wet and rhonchi. O2 at 89% RA.      Triage Assessment     Row Name 06/23/23 0370       Triage Assessment (Adult)    Airway WDL WDL       Respiratory WDL    Respiratory WDL X;cough    Cough Frequency frequent       Cardiac WDL    Cardiac WDL chest pain;rhythm       Chest Pain Assessment    Chest Pain Location midsternal    Chest Pain Radiation shoulder;arm    Associated Signs/Symptoms tachycardia

## 2023-06-24 NOTE — ED NOTES
"Pt woke up stating \"I am ready to go.\" Provider notified to sign AMA form with patient. Pt states \" I need to get home to my kitties and she has surgery at 8:41 this morning.\" Pt does not have family to call and refused the medical taxi offered to her. Pt states \"I live 2-3 miles from here and will just walk home.\" Pt states that she uses a walker to get around at home. Pt left without shoes.  "

## 2023-06-24 NOTE — ED PROVIDER NOTES
Emergency Department Encounter      NAME: Rukhsana Griffith  AGE: 62 year old female  YOB: 1960  MRN: [id number]  EVALUATION DATE & TIME: 2023 11:47 PM    PCP: Kei De Leon    ED PROVIDER: Royal Melissa M.D.      Chief Complaint   Patient presents with     Shortness of Breath         FINAL IMPRESSION:  1. COPD exacerbation (H)    2. Hypoxia        MEDICAL DECISION MAKIN:05 PM I met with the patient, obtained history, performed an initial exam, and discussed options and plan for diagnostics and treatment here in the ED.   1:23 AM Rechecked and re-examined patient, updated them on results  2:15 AM Discussed the case with the hospitalist, Dr. Roberts, who agrees to take in the patient    This patient is a 62-year-old female with a history of COPD, asthma, type 2 diabetes, hypertension, polysubstance abuse and schizoaffective disorder who is brought to the ER by ambulance for shortness of breath.  She says that that for the past 5 days she has been symptomatic with a cough which is productive with green phlegm.  She does not complain of feeling [place] and weak.  She has been feverish with chills and chest congestion.  She also noted in passing that she has had some difficulty with swallowing solids for the past 4 to 5 weeks.  She is able tolerate liquids down.  On exam the patient had wheezing noted    And some crackles.   Patient was given a DuoNeb and IV Solu-Medrol for the COPD flare.  She received a dose of Levaquin as a antibiotic coverage for possible pneumonia but for definite purulent bronchitis.  She has a history allergies to penicillins and cephalosporins.  A  EKG was done which did not show any sign of acute ischemia or MI.  I have independently reviewed and interpreted the EKG.  A chest x-ray was done and did not show any evidence of pneumonia.  I had evaluated interpreted the chest x-ray.  The patient was satting high 90s at rest after the treatment.  She was admitted  to the hospitalist service.  She did mention briefly that she wanted to be discharged home to take care of her pet cats.  After some discussion she was willing to stay in the hospital for treatment until she is able to find some in the care of her cats at home.    Pertinent Labs & Imaging studies reviewed. (See chart for details)    Medical Decision Making     History:    Supplemental history from: Documented in chart, if applicable    External Record(s) reviewed: Documented in chart, if applicable.     Work Up:    Chart documentation includes differential considered and any EKGs or imaging independently interpreted by provider, where specified.    In additional to work up documented, I considered the following work up: Documented in chart, if applicable.     External consultation:    Discussion of management with another provider: Documented in chart, if applicable     Complicating factors:    Care impacted by chronic illness: COPD, asthma, calcified granuloma of lung, DM Type 2, HDL, HTN, chronic abdominal pain, chronic hyponatremia, anemia, osteoporosis, seizure disorder, refractory epilepsy, tobacco abuse, anxiety methamphetamine abuse, psychoactive substance dependence, and schizoaffective disorder,    Care affected by social determinants of health: Access to Medical Care     Disposition considerations: Admit       MEDICATIONS GIVEN IN THE EMERGENCY:  Medications   lidocaine 1 % 0.1-1 mL (has no administration in time range)   lidocaine (LMX4) cream (has no administration in time range)   sodium chloride (PF) 0.9% PF flush 3 mL (3 mLs Intracatheter Not Given 6/24/23 0244)   sodium chloride (PF) 0.9% PF flush 3 mL (has no administration in time range)   melatonin tablet 1 mg (has no administration in time range)   acetaminophen (TYLENOL) tablet 650 mg (has no administration in time range)     Or   acetaminophen (TYLENOL) Suppository 650 mg (has no administration in time range)   diphenhydrAMINE (BENADRYL)  injection 25 mg (has no administration in time range)   albuterol (PROVENTIL HFA/VENTOLIN HFA) inhaler (has no administration in time range)   albuterol (PROVENTIL) neb solution 2.5 mg (has no administration in time range)   fluticasone-vilanterol (BREO ELLIPTA) 200-25 MCG/ACT inhaler 1 puff (has no administration in time range)   cholecalciferol TABS 50 mcg (has no administration in time range)   cloZAPine (CLOZARIL) tablet 200 mg (has no administration in time range)   escitalopram (LEXAPRO) tablet 10 mg (has no administration in time range)   famotidine (PEPCID) tablet 40 mg (has no administration in time range)   insulin glargine (LANTUS PEN) injection 25 Units (has no administration in time range)   lisinopril (ZESTRIL) tablet 10 mg (has no administration in time range)   nicotine (NICODERM CQ) 21 MG/24HR 24 hr patch 1 patch (has no administration in time range)     And   nicotine Patch in Place (has no administration in time range)   oxyCODONE-acetaminophen (PERCOCET) 5-325 MG per tablet 1 tablet (has no administration in time range)   pravastatin (PRAVACHOL) tablet 40 mg (has no administration in time range)   ramelteon (ROZEREM) tablet 8 mg (has no administration in time range)   topiramate (TOPAMAX) tablet 200 mg (has no administration in time range)   trimethobenzamide (TIGAN) capsule 300 mg (has no administration in time range)   0.9% sodium chloride BOLUS (0 mLs Intravenous Stopped 6/24/23 0154)   ipratropium - albuterol 0.5 mg/2.5 mg/3 mL (DUONEB) neb solution 3 mL (3 mLs Nebulization $Given 6/24/23 0047)   methylPREDNISolone sodium succinate (solu-MEDROL) injection 125 mg (125 mg Intravenous $Given 6/24/23 0111)   ipratropium - albuterol 0.5 mg/2.5 mg/3 mL (DUONEB) neb solution 3 mL (3 mLs Nebulization $Given 6/24/23 0149)   levofloxacin (LEVAQUIN) infusion 750 mg (0 mg Intravenous Stopped 6/24/23 0345)       NEW PRESCRIPTIONS STARTED AT TODAY'S ER VISIT:  Discharge Medication List as of 6/24/2023   6:22 AM             =================================================================    HPI    Patient information was obtained from: Patient    Use of : N/A        Rukhsana Griffith is a 62 year old female with a past medical history of COPD, asthma, calcified granuloma of lung, DM Type 2, HDL, HTN, chronic abdominal pain, chronic hyponatremia, anemia, osteoporosis, seizure disorder, refractory epilepsy, tobacco abuse, anxiety methamphetamine abuse, psychoactive substance dependence, and schizoaffective disorder, who presents with shortness of breath.     As per patient chart review, the patient was admitted to [facility] from 06/03 to 06/04 for COPD exacerbation. Imaging with no obvious pneumothorax or pneumonia, potential bronchitis. Exam with significantly diminished breath sounds bilaterally with christin improvement with work of breathing and other symptoms with neb treatments. Given Solu-medrol in ED    Patient endorses ongoing since 06/01 (23 days ago) worsening in the past 5 days with a productive cough and chest tightness. She additionally endorses fever, chills, weakness, and fatigue.     Patient additionally repots epigastric abdominal pain and difficulty keeping solids down, explain that she throws food back out. Reports this issue is ongoing for the past 4-5 weeks.       REVIEW OF SYSTEMS   Review of Systems   Constitutional: Positive for chills, fatigue and fever.   Respiratory: Positive for choking (productive), chest tightness and shortness of breath.    Gastrointestinal: Positive for abdominal pain (epigastric) and vomiting.   Neurological: Positive for weakness.   All other systems reviewed and are negative.       PAST MEDICAL HISTORY:  Past Medical History:   Diagnosis Date     COPD (chronic obstructive pulmonary disease) (H)      Diabetes (H)      HLD (hyperlipidemia)      Methamphetamine abuse (H)      Schizoaffective disorder (H)      Seizures (H)        PAST SURGICAL  HISTORY:  Past Surgical History:   Procedure Laterality Date     HYSTERECTOMY       KNEE SURGERY       OTHER SURGICAL HISTORY      gastric pacemaker     OTHER SURGICAL HISTORY      interstim placement     MN REVISE/REMOVE PERIPH/GASTRIC NEUROSTIM/ N/A 5/15/2019    Procedure: PARTIAL REMOVAL OLD LEAD;  Surgeon: Jcarlos Muñiz MD;  Location: [facility];  Service: Urology     RELEASE CARPAL TUNNEL         CURRENT MEDICATIONS:      Current Facility-Administered Medications:      acetaminophen (TYLENOL) tablet 650 mg, 650 mg, Oral, Q6H PRN **OR** acetaminophen (TYLENOL) Suppository 650 mg, 650 mg, Rectal, Q6H PRN, Amarilys Roberts MD     albuterol (PROVENTIL HFA/VENTOLIN HFA) inhaler, 2 puff, Inhalation, Q6H PRN, Amarilys Roberts MD     albuterol (PROVENTIL) neb solution 2.5 mg, 2.5 mg, Nebulization, Q6H, Amarilys Roberts MD     cholecalciferol TABS 50 mcg, 2,000 Units, Oral, Daily, Amarilys Roberts MD     cloZAPine (CLOZARIL) tablet 200 mg, 200 mg, Oral, At Bedtime, Amarilys Roberts MD     diphenhydrAMINE (BENADRYL) injection 25 mg, 25 mg, Intravenous, Q6H PRN, Amarilys Roberts MD     escitalopram (LEXAPRO) tablet 10 mg, 10 mg, Oral, Daily, Amarilys Roberts MD     famotidine (PEPCID) tablet 40 mg, 40 mg, Oral, BID, Amarilys Roberts MD     fluticasone-vilanterol (BREO ELLIPTA) 200-25 MCG/ACT inhaler 1 puff, 1 puff, Inhalation, Daily, Amarilys Roberts MD     insulin glargine (LANTUS PEN) injection 25 Units, 25 Units, Subcutaneous, QAM, Amarilys Roberts MD     lidocaine (LMX4) cream, , Topical, Q1H PRN, Amarilys Roberts MD     lidocaine 1 % 0.1-1 mL, 0.1-1 mL, Other, Q1H PRN, Amarilys Roberts MD     lisinopril (ZESTRIL) tablet 10 mg, 10 mg, Oral, Daily, Amarilys Roberts MD     melatonin tablet 1 mg, 1 mg, Oral, At Bedtime PRN, Amarilys Roberts MD     nicotine (NICODERM CQ) 21 MG/24HR 24 hr patch 1 patch, 1 patch, Transdermal, Daily **AND** nicotine Patch in Place, , Transdermal, Q8H ANTHONY, Amarilys Roberts MD     oxyCODONE-acetaminophen (PERCOCET) 5-325 MG per tablet 1 tablet, 1  tablet, Oral, Q8H PRN, Amarilys Roberts MD     pravastatin (PRAVACHOL) tablet 40 mg, 40 mg, Oral, QAM, Amarilys Roberts MD     ramelteon (ROZEREM) tablet 8 mg, 8 mg, Oral, At Bedtime, Amarilys Roberts MD     sodium chloride (PF) 0.9% PF flush 3 mL, 3 mL, Intracatheter, Q8H, Amarilys Roberts MD     sodium chloride (PF) 0.9% PF flush 3 mL, 3 mL, Intracatheter, q1 min prn, Amarilys Roberts MD     topiramate (TOPAMAX) tablet 200 mg, 200 mg, Oral, BID, Amarilys Roberts MD     trimethobenzamide (TIGAN) capsule 300 mg, 300 mg, Oral, TID PRN, Amarilys Roberts MD    Current Outpatient Medications:      acetaminophen (TYLENOL) 160 mg/5 mL (5 mL) Soln solution, [ACETAMINOPHEN (TYLENOL) 160 MG/5 ML (5 ML) SOLN SOLUTION] Take 10 mL by mouth every 4 (four) hours as needed (Pain).       , Disp: , Rfl:      albuterol (PROAIR HFA;PROVENTIL HFA;VENTOLIN HFA) 90 mcg/actuation inhaler, [ALBUTEROL (PROAIR HFA;PROVENTIL HFA;VENTOLIN HFA) 90 MCG/ACTUATION INHALER] Inhale 2 puffs every 6 (six) hours as needed for wheezing., Disp: , Rfl:      albuterol (PROVENTIL) 2.5 mg /3 mL (0.083 %) nebulizer solution, [ALBUTEROL (PROVENTIL) 2.5 MG /3 ML (0.083 %) NEBULIZER SOLUTION] Take 2.5 mg by nebulization every 4 (four) hours as needed for wheezing.       , Disp: , Rfl:      aluminum-magnesium hydroxide-simethicone (MAALOX MAX STRENGTH) 400-400-40 mg/5 mL suspension, [ALUMINUM-MAGNESIUM HYDROXIDE-SIMETHICONE (MAALOX MAX STRENGTH) 400-400-40 MG/5 ML SUSPENSION] Take 5 mL by mouth every 4 (four) hours as needed for indigestion.       , Disp: , Rfl:      ammonium lactate (AMLACTIN) 12 % cream, [AMMONIUM LACTATE (AMLACTIN) 12 % CREAM] Apply 1 application topically 2 (two) times a day.       , Disp: , Rfl:      atropine 1 % ophthalmic solution, [ATROPINE 1 % OPHTHALMIC SOLUTION] Place 1-2 drops under the tongue 2 (two) times a day. Takes under tongue to stop drooling      , Disp: , Rfl:      budesonide-formoterol (SYMBICORT) 160-4.5 mcg/actuation inhaler, [BUDESONIDE-FORMOTEROL  (SYMBICORT) 160-4.5 MCG/ACTUATION INHALER] Inhale 2 puffs 2 (two) times a day.       , Disp: , Rfl:      cholecalciferol, vitamin D3, 2,000 unit Tab, [CHOLECALCIFEROL, VITAMIN D3, 2,000 UNIT TAB] Take 2,000 Units by mouth daily., Disp: , Rfl:      cloZAPine (CLOZARIL) 100 MG tablet, [CLOZAPINE (CLOZARIL) 100 MG TABLET] Take 200 mg by mouth at bedtime.       , Disp: , Rfl:      diphenhydrAMINE (BENADRYL) 25 mg capsule, [DIPHENHYDRAMINE (BENADRYL) 25 MG CAPSULE] Take 25 mg by mouth at bedtime as needed for itching., Disp: , Rfl:      docusate sodium (COLACE) 100 MG capsule, [DOCUSATE SODIUM (COLACE) 100 MG CAPSULE] Take 100 mg by mouth 2 (two) times a day.       , Disp: , Rfl:      EPINEPHrine (EPIPEN) 0.3 mg/0.3 mL atIn, [EPINEPHRINE (EPIPEN) 0.3 MG/0.3 ML ATIN] Inject 0.3 mg into the shoulder, thigh, or buttocks as needed., Disp: , Rfl:      escitalopram oxalate (LEXAPRO) 10 MG tablet, [ESCITALOPRAM OXALATE (LEXAPRO) 10 MG TABLET] Take 10 mg by mouth daily.       , Disp: , Rfl:      famotidine (PEPCID) 40 MG tablet, [FAMOTIDINE (PEPCID) 40 MG TABLET] Take 40 mg by mouth 2 (two) times a day., Disp: , Rfl:      fluoride, sodium, (DENTAGEL) 1.1 % Gel dental gel, [FLUORIDE, SODIUM, (DENTAGEL) 1.1 % GEL DENTAL GEL] Apply 1 application to teeth at bedtime., Disp: , Rfl:      glucagon, human recombinant, (GLUCAGEN HYPOKIT) 1 mg injection, [GLUCAGON, HUMAN RECOMBINANT, (GLUCAGEN HYPOKIT) 1 MG INJECTION] Infuse 1 mg into a venous catheter once as needed (Low blood sugars).       , Disp: , Rfl:      insulin aspart U-100 (NOVOLOG) 100 unit/mL injection, [INSULIN ASPART U-100 (NOVOLOG) 100 UNIT/ML INJECTION] Inject 15 Units under the skin 3 (three) times a day before meals. Sliding scale       , Disp: , Rfl:      insulin glargine (LANTUS) 100 unit/mL injection, [INSULIN GLARGINE (LANTUS) 100 UNIT/ML INJECTION] Inject 25 Units under the skin every morning.       , Disp: , Rfl:      ipratropium-albuterol (DUO-NEB) 0.5-2.5 mg/3 mL  nebulizer, [IPRATROPIUM-ALBUTEROL (DUO-NEB) 0.5-2.5 MG/3 ML NEBULIZER] Take 3 mL by nebulization 4 (four) times a day as needed.       , Disp: , Rfl:      lisinopril (PRINIVIL,ZESTRIL) 10 MG tablet, [LISINOPRIL (PRINIVIL,ZESTRIL) 10 MG TABLET] Take 10 mg by mouth daily., Disp: , Rfl:      loratadine (CLARITIN) 10 mg tablet, [LORATADINE (CLARITIN) 10 MG TABLET] Take 10 mg by mouth daily., Disp: , Rfl:      lubiprostone (AMITIZA) 24 MCG capsule, [LUBIPROSTONE (AMITIZA) 24 MCG CAPSULE] Take 24 mcg by mouth 2 (two) times a day., Disp: , Rfl:      magnesium hydroxide (MILK OF MAG) 400 mg/5 mL Susp suspension, [MAGNESIUM HYDROXIDE (MILK OF MAG) 400 MG/5 ML SUSP SUSPENSION] Take 30 mL by mouth 2 (two) times a day as needed.       , Disp: , Rfl:      mometasone (NASONEX) 50 mcg/actuation nasal spray, [MOMETASONE (NASONEX) 50 MCG/ACTUATION NASAL SPRAY] 2 sprays into each nostril daily., Disp: , Rfl:      multivitamin with minerals (THERA-M) 9 mg iron-400 mcg Tab tablet, [MULTIVITAMIN WITH MINERALS (THERA-M) 9 MG IRON-400 MCG TAB TABLET] Take 1 tablet by mouth daily., Disp: , Rfl:      nicotine (NICODERM CQ) 21 mg/24 hr, [NICOTINE (NICODERM CQ) 21 MG/24 HR] Place 1 patch on the skin daily as needed for smoking cessation.       , Disp: , Rfl:      oxyCODONE-acetaminophen (PERCOCET/ENDOCET) 5-325 mg per tablet, [OXYCODONE-ACETAMINOPHEN (PERCOCET/ENDOCET) 5-325 MG PER TABLET] Take 1 tablet by mouth every 6 (six) hours as needed for pain., Disp: 12 tablet, Rfl: 0     polyethylene glycol (MIRALAX) 17 gram packet, [POLYETHYLENE GLYCOL (MIRALAX) 17 GRAM PACKET] Take 17 g by mouth 2 (two) times a day.       , Disp: , Rfl:      pravastatin (PRAVACHOL) 40 MG tablet, [PRAVASTATIN (PRAVACHOL) 40 MG TABLET] Take 40 mg by mouth every morning.       , Disp: , Rfl:      ramelteon (ROZEREM) 8 mg tablet, [RAMELTEON (ROZEREM) 8 MG TABLET] Take 8 mg by mouth at bedtime., Disp: , Rfl:      SUMAtriptan (IMITREX) 100 MG tablet, [SUMATRIPTAN  "(IMITREX) 100 MG TABLET] Take 100 mg by mouth every 2 (two) hours as needed for migraine., Disp: , Rfl:      topiramate (TOPAMAX) 200 MG tablet, [TOPIRAMATE (TOPAMAX) 200 MG TABLET] Take 200 mg by mouth 2 (two) times a day., Disp: , Rfl:      triamcinolone (KENALOG) 0.1 % ointment, [TRIAMCINOLONE (KENALOG) 0.1 % OINTMENT] Apply 1 application topically 2 (two) times a day., Disp: , Rfl:      trimethobenzamide (TIGAN) 300 mg capsule, [TRIMETHOBENZAMIDE (TIGAN) 300 MG CAPSULE] Take 300 mg by mouth 3 (three) times a day as needed (N/V)., Disp: , Rfl:     ALLERGIES:  Allergies   Allergen Reactions     Aspirin Unknown     Other reaction(s): Bleeding     Bee Sting Kit [Bee Venom] Anaphylaxis     Botox [Onabotulinumtoxina] Hives and Shortness Of Breath     Garlic Hives     Hydrocodone Headache, Hives and Itching     Hydromorphone Other (See Comments) and Rash     Seizures     Hydroxyzine Hives, Other (See Comments), Anaphylaxis and Shortness Of Breath     Dyspnea     Hymenoptera Allergenic Extract [Wasp Venom Protein] Anaphylaxis     Iodinated Contrast Media Hives, Other (See Comments), Itching and Swelling     EDEMA     Latex Shortness Of Breath     Lidocaine Hives     lidoderm patch only; tolerates injectable lidocaine     Onion Hives     green, red, yellow, mushrooms, garlic---all cause pt to have hives     Oxycodone Hives and Rash     Procaine Hives     tolerates injectable bupivacaine and lidocaine       Sucralose Anaphylaxis     Diazepam Rash     also: parasomnia, as pt reported waking up in bathtub     Haloperidol Rash     Ketorolac Rash     Lithium Rash     Meperidine Rash     Diatrizoate Meglumine [Diatrizoate] Hives and Swelling     Green Pepper [Capsicum] Hives     Metformin Other (See Comments)     \"Stomach bleeding and kidney infection\"     Adhesive Tape-Silicones [Adhesive Tape] Rash     [place] [Nuts] Rash     Cephalosporins Rash     Chlorpromazine Rash     THORAZINE     Chlorpropamide Rash     Coconut (Cocos " Nucifera) Rash     Codeine Headache     Migraines        Darvocet A500 [Propoxyphene N-Apap] Rash     Dipyridamole Rash     Fentanyl Rash     Fluoxetine Rash     Furosemide Rash     Glyburide Other (See Comments) and Rash     Other reaction(s): hypoglycemia (patient is very sensitive to sulfonylureas)     Ondansetron Rash     Penicillins Rash and Other (See Comments)     Migraines, dizzy and sweating     Shellfish Containing Products [Shellfish-Derived Products] Rash     Sulfa (Sulfonamide Antibiotics) [Sulfa Antibiotics] Rash     Thallium-201 [Thallous Chloride Tl 201] Rash     Thioridazine Rash     Tizanidine Rash       FAMILY HISTORY:  Family History   Adopted: Yes       SOCIAL HISTORY:   Social History     Socioeconomic History     Marital status:    Tobacco Use     Smoking status: Every Day     Packs/day: 0.25     Types: Cigarettes     Smokeless tobacco: Never   Substance and Sexual Activity     Alcohol use: Not Currently     Drug use: Not Currently       PHYSICAL EXAM:    Vitals: /56   Pulse 95   Temp 98.2  F (36.8  C) (Oral)   Resp 20   SpO2 (!) 89%    Constitutional: Disheveled female with hoarse wet cough  HEAD:Normocephalic, atraumatic,   Eyes: PERRLA, EOM intact, conjunctiva clear, no discharge  ENT: mucous membranes moist, nose normal.   Neck- Supple, gross ROM intact.  No JVD.  No palpable nodes.  Pulmonary: Bilateral wheezing, bilateral basilar crackles, moving air well, no retractions. Able to speak in full sentences.   Chest: No chest wall tenderness  Cardiovascular: Normal heart rate, regular rhythm, no murmurs. No lower extremity edema, 2+ DP pulses.   GI: Soft, not distended, no masses.  No hepatosplenomegaly. Mild tenderness to epigastric abdominal pain.  Musculoskeletal:No calf tenderness or leg edema.  Moving all 4 extremities intentionally and without pain. No obvious deformity.  Back: No CVA tenderness   Skin: Warm, dry, no rash.  Neurologic: Alert & oriented x 3, speech  clear, moving all extremities spontaneously   Psychiatric: Affect normal, cooperative.     LAB:  All pertinent labs reviewed and interpreted.  Labs Ordered and Resulted from Time of ED Arrival to Time of ED Departure   PROCALCITONIN - Abnormal       Result Value    Procalcitonin 0.05 (*)    CRP INFLAMMATION - Abnormal    CRP Inflammation 74.60 (*)    COMPREHENSIVE METABOLIC PANEL - Abnormal    Sodium 133 (*)     Potassium 3.4      Chloride 100      Carbon Dioxide (CO2) 22      Anion Gap 11      Urea Nitrogen 7.8 (*)     Creatinine 0.58      Calcium 10.3 (*)     Glucose 184 (*)     Alkaline Phosphatase 128 (*)     AST 22      ALT 17      Protein Total 7.1      Albumin 3.6      Bilirubin Total 0.2      GFR Estimate >90     LIPASE - Abnormal    Lipase 12 (*)    CBC WITH PLATELETS AND DIFFERENTIAL - Abnormal    WBC Count 14.5 (*)     RBC Count 4.55      Hemoglobin 14.1      Hematocrit 42.6      MCV 94      MCH 31.0      MCHC 33.1      RDW 14.3      Platelet Count 210      % Neutrophils 64      % Lymphocytes 21      % Monocytes 12      % Eosinophils 1      % Basophils 1      % Immature Granulocytes 1      NRBCs per 100 WBC 0      Absolute Neutrophils 9.4 (*)     Absolute Lymphocytes 3.0      Absolute Monocytes 1.7 (*)     Absolute Eosinophils 0.2      Absolute Basophils 0.1      Absolute Immature Granulocytes 0.1      Absolute NRBCs 0.0     LACTIC ACID WHOLE BLOOD - Normal    Lactic Acid 0.7     NT PROBNP INPATIENT - Normal    N terminal Pro BNP Inpatient 92     MAGNESIUM - Normal    Magnesium 1.8     D DIMER QUANTITATIVE   COMPREHENSIVE METABOLIC PANEL   CBC WITH PLATELETS   BLOOD CULTURE       RADIOLOGY:  XR Chest Port 1 View   Final Result   IMPRESSION: Negative chest.          EKG:   Performed at: 24-Jun-2023  Impression: Borderline ECG  Rightward axis  Low voltage QRS    Rate: 102  Rhythm: Sinus  QRS Interval: 80  QTc Interval: 474  Comparison: Compared to 03-June-2023  No significant change was found.   I have  independently reviewed and interpreted the EKG(s) documented above.     PROCEDURES:   Procedures       I, Kevinmark Alexus, am serving as a scribe to document services personally performed by Dr. Royal Melissa based on my observation and the provider's statements to me. I, Royal Melissa M.D. attest that Marcus Vaughan is acting in a scribe capacity, has observed my performance of the services and has documented them in accordance with my direction.      Royal Melissa M.D.  Emergency Medicine  [facility] EMERGENCY DEPARTMENT  [address] [place] [id number]  [phone]  Dept: [phone]       Royal Melissa MD  06/24/23 0710

## 2023-06-24 NOTE — CONSULTS
[facility]  Consult Note - Hospitalist Service  Date of Admission:  6/23/2023  Consult Requested by: ER  Reason for Consult: productive cough and dyspnea    Assessment & Plan   Rukhsana Griffith is a 62 year old female admitted on 6/23/2023.         Acute respiratory failure  Acute bronchitis  Nicotine dependence  -ER gave 750mg iv levaquin and 125mg iv solumedrol before patient left AMA, she is aware that she may die from respiratory problem without oxygen and antibiotics etc, she insisted on going home because her cat is having hip surgery at 8:41am, she insisted on walking home 2 miles away, but our review indicates the distance is more than that. She answered that she will not come back to ER even if her condition deteriorates  -d-dimer pending     Hyponatremia  Hypercalcemia  -monitor with PCP     HTN  DM  -follow up with PCP     Schizoaffective disorder  -follow up with mental health as outpatient     Clinically Significant Risk Factors Present on Admission           # Hypercalcemia: Highest Ca = 10.3 mg/dL in last 2 days, will monitor as appropriate        # Hypertension: Noted on problem list   # Acute Respiratory Failure: Documented O2 saturation < 91%.  Continue supplemental oxygen as needed    # DMII: A1C = 6.9 % (Ref range: <5.7 %) within past 6 months            Amarilys Roberts MD  Hospitalist Service  Securely message with [employer] (more info)  Text page via [employer] Paging/Directory   ______________________________________________________________________    Chief Complaint   Dyspnea and productive cough    History of Present Illness   Rukhsana Griffith is a 62 year old female with a past medical history of COPD, asthma, calcified granuloma of lung, DM Type 2, HDL, HTN, chronic abdominal pain, chronic hyponatremia, anemia, osteoporosis, seizure disorder, refractory epilepsy, tobacco abuse, anxiety methamphetamine abuse, psychoactive substance dependence, and schizoaffective disorder, who  presents with shortness of breath.      As per patient chart review, the patient was admitted to [facility] from 06/03 to 06/04 for COPD exacerbation. Imaging with no obvious pneumothorax or pneumonia, potential bronchitis. Exam with significantly diminished breath sounds bilaterally with some improvement with work of breathing and other symptoms with neb treatments. Given Solu-medrol in ED     Patient endorses productive cough and chest tightness worsening in the past 5 days, associated with fever, chills, weakness, and fatigue.      Patient additionally repots epigastric abdominal pain and difficulty keeping solids down for the past 4-5 weeks.     Past Medical History    Past Medical History:   Diagnosis Date     COPD (chronic obstructive pulmonary disease) (H)      Diabetes (H)      HLD (hyperlipidemia)      Methamphetamine abuse (H)      Schizoaffective disorder (H)      Seizures (H)        Past Surgical History   Past Surgical History:   Procedure Laterality Date     HYSTERECTOMY       KNEE SURGERY       OTHER SURGICAL HISTORY      gastric pacemaker     OTHER SURGICAL HISTORY      interstim placement     [place] REVISE/REMOVE PERIPH/GASTRIC NEUROSTIM/ N/A 5/15/2019    Procedure: PARTIAL REMOVAL OLD LEAD;  Surgeon: Jcarlos Muñiz MD;  Location: [facility];  Service: Urology     RELEASE CARPAL TUNNEL         Medications   I have reviewed this patient's current medications       Review of Systems    The 10 point Review of Systems is negative other than noted in the HPI or here.      Physical Exam   Vital Signs: Temp: 98.2  F (36.8  C) Temp src: Oral BP: 134/56 Pulse: 95   Resp: 20 SpO2: (!) 89 % O2 Device: Nasal cannula Oxygen Delivery: 4 LPM  Weight: 0 lbs 0 oz    Constitutional: Awake, alert, cooperative, no apparent distress.  Eyes: Conjunctiva and pupils examined and normal.  HEENT: Moist mucous membranes, normal dentition.  Respiratory: coarse bilaterally, no wheezing.  Cardiovascular:  Regular rate and rhythm, normal S1 and S2, and no murmur noted.  GI: Soft, non-distended, non-tender, normal bowel sounds.  Lymph/Hematologic: No anterior cervical or supraclavicular adenopathy.  Skin: No rashes, no cyanosis, no edema.  Musculoskeletal: No joint swelling, erythema or tenderness.  Neurologic: Cranial nerves 2-12 intact, normal strength and sensation.  Psychiatric: Alert, oriented to person, place and time, no obvious anxiety or depression.    Medical Decision Making       75 MINUTES SPENT BY ME on the date of service doing chart review, history, exam, documentation & further activities per the note.      Data     I have personally reviewed the following data over the past 24 hrs:    14.5 (H)  \   14.1   / 210     133 (L) 100 7.8 (L) /  184 (H)   3.4 22 0.58 \       ALT: 17 AST: 22 AP: 128 (H) TBILI: 0.2   ALB: 3.6 TOT PROTEIN: 7.1 LIPASE: 12 (L)       Trop: N/A BNP: 92       Procal: 0.05 (H) CRP: 74.60 (H) Lactic Acid: 0.7          CXR  Negative chest.

## 2023-06-24 NOTE — ED NOTES
"Pt requesting to leave because she needs to go home and take care of her kitties. She states \"my kitties can not be without food for 24 hours and I have no one to help take care of them.\" Pt was told that if she leaves it will be considered against medical advice. Writer asked pt if she took anything to help with sleep and pt states she takes trazodone. When asked if she wants anything to help her sleep she stated \"you wont trap me like that.\" Pt agreed to stay until IV antibiotics are completed and understands that she could get worse if going home AMA. Provider notified and spoke with patient.  "

## 2023-06-24 NOTE — ED NOTES
Informed Dr. Melissa that pt's O2 sats at 2L/ NC is 88% and pt is sleeping. Pt is not on home O2 and a COPD pt. Per Dr. Melissa it is okay for pt to have her O2 sats at 88% on 2L/NC.

## 2023-06-24 NOTE — ED NOTES
Dr Roberts came to speak with pt about leaving AMA. Pt was sleeping and hard to wake. Provider stated she will try to get in touch with family and speak with pt when she wakes up.

## 2023-06-24 NOTE — ED NOTES
"Pt returned with [employer] after being found falling down while trying to walk \"home.\" Pt was offered a cab ride prior to leaving [place] and declined. PD and writer spoke with pt and pt agreed to sit and wait for cab. Pt is very unsteady on feet and short of breath, continues to refuse further treatment. Pt waiting on cab in lobby.  "

## 2023-06-24 NOTE — ED NOTES
Bed: JNED-02  Expected date: 6/23/23  Expected time: 11:33 PM  Means of arrival: Ambulance  Comments:  Tesfaye 68F fever, SOB, duonebs given, asthma hx

## 2023-06-26 ENCOUNTER — PATIENT OUTREACH (OUTPATIENT)
Dept: CARE COORDINATION | Facility: CLINIC | Age: 63
End: 2023-06-26
Payer: MEDICAID

## 2023-06-26 NOTE — PROGRESS NOTES
"Clinic Care Coordination Contact  [facility]: Post-Discharge Note  SITUATION                                                      Admission:    Admission Date: 06/23/23   Reason for Admission: Shortness of breath  Discharge:   Discharge Date: 06/24/23  Discharge Diagnosis: COPD exacerbation, Hypoxia    BACKGROUND                                                      Per hospital discharge summary and inpatient provider notes:    Rukhsana Griffith is a 62 year old female with a past medical history of COPD, asthma, calcified granuloma of lung, DM Type 2, HDL, HTN, chronic abdominal pain, chronic hyponatremia, anemia, osteoporosis, seizure disorder, refractory epilepsy, tobacco abuse, anxiety methamphetamine abuse, psychoactive substance dependence, and schizoaffective disorder, who presents with shortness of breath.      As per patient chart review, the patient was admitted to [facility] from 06/03 to 06/04 for COPD exacerbation. Imaging with no obvious pneumothorax or pneumonia, potential bronchitis. Exam with significantly diminished breath sounds bilaterally with christin improvement with work of breathing and other symptoms with neb treatments. Given Solu-medrol in ED     Patient endorses ongoing since 06/01 (23 days ago) worsening in the past 5 days with a productive cough and chest tightness. She additionally endorses fever, chills, weakness, and fatigue.      Patient additionally repots epigastric abdominal pain and difficulty keeping solids down, explain that she throws food back out. Reports this issue is ongoing for the past 4-5 weeks.     ASSESSMENT      Discharge Assessment  How are you doing now that you are home?: \"I'm still coughing and spitting up a lot of stuff\"  How are your symptoms? (Red Flag symptoms escalate to triage hotline per guidelines): Unchanged  Do you feel your condition is stable enough to be safe at home until your provider visit?: Yes  Does the patient have their discharge " instructions? : Yes  Does the patient have questions regarding their discharge instructions? : No  Were you started on any new medications or were there changes to any of your previous medications? : No  Does the patient have all of their medications?: Yes  Do you have questions regarding any of your medications? : No  Do you have all of your needed medical supplies or equipment (DME)?  (i.e. oxygen tank, CPAP, cane, etc.): Yes  Discharge follow-up appointment scheduled within 14 calendar days? : Yes  Discharge Follow Up Appointment Date: 07/07/23  Discharge Follow Up Appointment Scheduled with?: Primary Care Provider    Post-op ([facility] Only)  If the patient had a surgery or procedure, do they have any questions for a nurse?: No    PLAN                                                      Outpatient Plan:      Follow up with primary care provider.    Follow up with mental health as outpatient.    Future Appointments   Date Time Provider Department Center   7/5/2023  9:00 AM Reymundo Mcclelland MD [facility]         For any urgent concerns, please contact our 24 hour nurse triage line: [phone] ([phone])       Yasmin Ceballos  Community Health Worker  [facility]  Ph: [phone]

## 2023-06-28 NOTE — TELEPHONE ENCOUNTER
FUTURE VISIT INFORMATION      FUTURE VISIT INFORMATION:    Date: 7/12/23    Time: 8AM    Location: [facility]  REFERRAL INFORMATION:    Referring provider:  Dr Abilio Rehman     Referring providers clinic:  [facility]    Reason for visit/diagnosis  chronic headache, neuritis of both eyes , retrobulbar neuritis     RECORDS REQUESTED FROM:       Clinic name Comments Records Status Imaging Status   EYE [facility]   5/3/23 Shane Silverio, OD [facility]     [facility]     MRN: [id number]    Fx. [phone] CT HEAD: 4/11/23    req 6/28/23  CT Temp: 6/8/22 - 2010  MR Brain: 1/21/2009 Care [facility]  PACS           6/28/23 2PM sent a fax to [facility] for images - Amay

## 2023-06-29 LAB — BACTERIA BLD CULT: NO GROWTH

## 2023-07-05 ENCOUNTER — PRE VISIT (OUTPATIENT)
Dept: OPHTHALMOLOGY | Facility: CLINIC | Age: 63
End: 2023-07-05

## 2023-07-12 ENCOUNTER — PRE VISIT (OUTPATIENT)
Dept: OPHTHALMOLOGY | Facility: CLINIC | Age: 63
End: 2023-07-12

## 2023-07-12 DIAGNOSIS — H53.40 VISUAL FIELD DEFECT: Primary | ICD-10-CM

## 2023-07-19 ENCOUNTER — TELEPHONE (OUTPATIENT)
Dept: OPHTHALMOLOGY | Facility: CLINIC | Age: 63
End: 2023-07-19
Payer: MEDICAID

## 2023-07-19 NOTE — TELEPHONE ENCOUNTER
Spoke with patient re no show and late cancel policy.  Patient is aware she can make one more appointment and if she no shows or cancels we will refer her back to her primary.  She has some other medical issues to take care of and will call after those are resolved.  Brooks Christine

## 2023-09-02 ENCOUNTER — APPOINTMENT (OUTPATIENT)
Dept: RADIOLOGY | Facility: HOSPITAL | Age: 63
End: 2023-09-02
Attending: EMERGENCY MEDICINE
Payer: MEDICAID

## 2023-09-02 ENCOUNTER — HOSPITAL ENCOUNTER (EMERGENCY)
Facility: HOSPITAL | Age: 63
Discharge: HOME | End: 2023-09-02
Attending: EMERGENCY MEDICINE | Admitting: EMERGENCY MEDICINE
Payer: MEDICAID

## 2023-09-02 VITALS
RESPIRATION RATE: 23 BRPM | HEART RATE: 84 BPM | BODY MASS INDEX: 28.85 KG/M2 | DIASTOLIC BLOOD PRESSURE: 62 MMHG | HEIGHT: 64 IN | WEIGHT: 169 LBS | SYSTOLIC BLOOD PRESSURE: 142 MMHG | OXYGEN SATURATION: 90 % | TEMPERATURE: 98.2 F

## 2023-09-02 DIAGNOSIS — J44.89 OBSTRUCTIVE CHRONIC BRONCHITIS WITHOUT EXACERBATION (H): ICD-10-CM

## 2023-09-02 DIAGNOSIS — G89.29 CHRONIC ABDOMINAL PAIN: ICD-10-CM

## 2023-09-02 DIAGNOSIS — R10.9 CHRONIC ABDOMINAL PAIN: ICD-10-CM

## 2023-09-02 LAB
ALBUMIN SERPL BCG-MCNC: 3.7 G/DL (ref 3.5–5.2)
ALBUMIN UR-MCNC: NEGATIVE MG/DL
ALP SERPL-CCNC: 112 U/L (ref 35–104)
ALT SERPL W P-5'-P-CCNC: 22 U/L (ref 0–50)
ANION GAP SERPL CALCULATED.3IONS-SCNC: 10 MMOL/L (ref 7–15)
APPEARANCE UR: CLEAR
AST SERPL W P-5'-P-CCNC: 27 U/L (ref 0–45)
BACTERIA #/AREA URNS HPF: NORMAL /HPF
BASOPHILS # BLD AUTO: 0.1 10E3/UL (ref 0–0.2)
BASOPHILS NFR BLD AUTO: 1 %
BILIRUB SERPL-MCNC: <0.2 MG/DL
BILIRUB UR QL STRIP: NEGATIVE
BUN SERPL-MCNC: 11.8 MG/DL (ref 8–23)
CALCIUM SERPL-MCNC: 10 MG/DL (ref 8.8–10.2)
CHLORIDE SERPL-SCNC: 99 MMOL/L (ref 98–107)
COLOR UR AUTO: NORMAL
CREAT SERPL-MCNC: 0.55 MG/DL (ref 0.51–0.95)
DEPRECATED HCO3 PLAS-SCNC: 24 MMOL/L (ref 22–29)
EOSINOPHIL # BLD AUTO: 0.2 10E3/UL (ref 0–0.7)
EOSINOPHIL NFR BLD AUTO: 1 %
ERYTHROCYTE [DISTWIDTH] IN BLOOD BY AUTOMATED COUNT: 14.9 % (ref 10–15)
GFR SERPL CREATININE-BSD FRML MDRD: >90 ML/MIN/1.73M2
GLUCOSE BLDC GLUCOMTR-MCNC: 155 MG/DL (ref 70–99)
GLUCOSE SERPL-MCNC: 157 MG/DL (ref 70–99)
GLUCOSE UR STRIP-MCNC: NEGATIVE MG/DL
HCT VFR BLD AUTO: 41.8 % (ref 35–47)
HGB BLD-MCNC: 13.6 G/DL (ref 11.7–15.7)
HGB UR QL STRIP: NEGATIVE
IMM GRANULOCYTES # BLD: 0.2 10E3/UL
IMM GRANULOCYTES NFR BLD: 1 %
KETONES UR STRIP-MCNC: NEGATIVE MG/DL
LEUKOCYTE ESTERASE UR QL STRIP: NEGATIVE
LIPASE SERPL-CCNC: 14 U/L (ref 13–60)
LYMPHOCYTES # BLD AUTO: 3.3 10E3/UL (ref 0.8–5.3)
LYMPHOCYTES NFR BLD AUTO: 20 %
MCH RBC QN AUTO: 30.2 PG (ref 26.5–33)
MCHC RBC AUTO-ENTMCNC: 32.5 G/DL (ref 31.5–36.5)
MCV RBC AUTO: 93 FL (ref 78–100)
MONOCYTES # BLD AUTO: 1.8 10E3/UL (ref 0–1.3)
MONOCYTES NFR BLD AUTO: 11 %
NEUTROPHILS # BLD AUTO: 10.7 10E3/UL (ref 1.6–8.3)
NEUTROPHILS NFR BLD AUTO: 66 %
NITRATE UR QL: NEGATIVE
NRBC # BLD AUTO: 0 10E3/UL
NRBC BLD AUTO-RTO: 0 /100
NT-PROBNP SERPL-MCNC: 50 PG/ML (ref 0–900)
PH UR STRIP: 7 [PH] (ref 5–7)
PLATELET # BLD AUTO: 226 10E3/UL (ref 150–450)
POTASSIUM SERPL-SCNC: 3.7 MMOL/L (ref 3.4–5.3)
PROT SERPL-MCNC: 6.8 G/DL (ref 6.4–8.3)
RBC # BLD AUTO: 4.5 10E6/UL (ref 3.8–5.2)
RBC URINE: <1 /HPF
SARS-COV-2 RNA RESP QL NAA+PROBE: NEGATIVE
SODIUM SERPL-SCNC: 133 MMOL/L (ref 136–145)
SP GR UR STRIP: 1.01 (ref 1–1.03)
TROPONIN T SERPL HS-MCNC: 13 NG/L
TROPONIN T SERPL HS-MCNC: 15 NG/L
UROBILINOGEN UR STRIP-MCNC: <2 MG/DL
WBC # BLD AUTO: 16.3 10E3/UL (ref 4–11)
WBC URINE: 1 /HPF

## 2023-09-02 PROCEDURE — 80053 COMPREHEN METABOLIC PANEL: CPT | Performed by: EMERGENCY MEDICINE

## 2023-09-02 PROCEDURE — 84484 ASSAY OF TROPONIN QUANT: CPT | Mod: 91 | Performed by: EMERGENCY MEDICINE

## 2023-09-02 PROCEDURE — 36415 COLL VENOUS BLD VENIPUNCTURE: CPT | Performed by: EMERGENCY MEDICINE

## 2023-09-02 PROCEDURE — 83690 ASSAY OF LIPASE: CPT | Performed by: EMERGENCY MEDICINE

## 2023-09-02 PROCEDURE — 85025 COMPLETE CBC W/AUTO DIFF WBC: CPT | Performed by: EMERGENCY MEDICINE

## 2023-09-02 PROCEDURE — 87635 SARS-COV-2 COVID-19 AMP PRB: CPT | Performed by: EMERGENCY MEDICINE

## 2023-09-02 PROCEDURE — 81001 URINALYSIS AUTO W/SCOPE: CPT | Performed by: EMERGENCY MEDICINE

## 2023-09-02 PROCEDURE — 93005 ELECTROCARDIOGRAM TRACING: CPT | Performed by: EMERGENCY MEDICINE

## 2023-09-02 PROCEDURE — 71046 X-RAY EXAM CHEST 2 VIEWS: CPT

## 2023-09-02 PROCEDURE — 83880 ASSAY OF NATRIURETIC PEPTIDE: CPT | Performed by: EMERGENCY MEDICINE

## 2023-09-02 PROCEDURE — 99285 EMERGENCY DEPT VISIT HI MDM: CPT | Mod: 25

## 2023-09-02 PROCEDURE — 82962 GLUCOSE BLOOD TEST: CPT

## 2023-09-02 ASSESSMENT — ACTIVITIES OF DAILY LIVING (ADL)
ADLS_ACUITY_SCORE: 35

## 2023-09-02 NOTE — ED PROVIDER NOTES
EMERGENCY DEPARTMENT ENCOUNTER      NAME: Rukhsana Griffith  AGE: 62 year old female  YOB: 1960  MRN: [id number]  EVALUATION DATE & TIME: 9/2/2023 12:23 AM    PCP: Kei De Leon    ED PROVIDER: Brynn Romero MD    Chief Complaint   Patient presents with    Fall    Dizziness    Shortness of Breath         FINAL IMPRESSION:  1. Obstructive chronic bronchitis without exacerbation (H)    2. Chronic abdominal pain          ED COURSE & MEDICAL DECISION MAKING:    Pertinent Labs & Imaging studies reviewed. (See chart for details)  62 year old female with history of HTN, HLD, diabetes with gastroparesis status post stimulator, COPD/chronic bronchitis, schizoaffective disorder, methamphetamine abuse who presents to the Emergency Department for evaluation of complaints of dizziness and shortness of breath.    Patient actually has numerous complaints.  She complains of chronic dyspnea, chronic cough.  Per chart review was seen recently at [place] for hypoglycemia.  Was wheezing at that time and was given a prednisone burst for bronchitis.  Her lungs are actually clear today.  She does not look like she is in any respiratory distress.  Her O2 saturations are around 90 to 92% on room air.  This appears to be her baseline.  In fact she is often below 90 at around 88 to 89%.  Symptoms do not can seem consistent with COPD exacerbation, active bronchitis.  Certainly symptomatic anemia, arrhythmia, ACS or new onset heart failure on the differential but I suspect less likely.    Secondarily she complains of some epigastric to right upper quadrant abdominal pain overlying her gastric stimulator.  This is reproducible with palpation on examination.  Per chart review this appears to be a chronic issue for her.  I suspect her pain is related to chronic abdominal pain and gastroparesis but will obtain lipase and hepatobiliary pathology to rule out acute abnormalities.  Certainly electrolyte abnormality, DKA or HHS  would may be contribute to the above as well.    Lastly she fell x2 today.  No injuries of as a result of the falls.  States she was not using her walker either time.  Appears to have poor functional capacity at baseline.  Would potentially benefit from transitioning to assisted living facility.  Counseled patient regarding same.    Patient placed on monitor, IV established and blood obtained.  Twelve-lead EKG shows sinus rhythm.  COVID swab negative.  Urinalysis unremarkable.  CBC, CMP, lipase notable for WBC of 16.3.  Recent glucocorticoids, and this does also appear to be chronic in nature.  BMP unremarkable.  Troponin in indeterminate range at 15.  Will repeat.  Chest x-ray with minimal pulmonary evaluate his congestion.  Repeat troponin downtrending.  Trial of ambulation, able ambulate with walker without difficulty.  Hospital admission considered, but with reassuring serial cardiac enzymes, able to ambulate with walker, patient is safe for discharge to home.       ED Course as of 09/02/23 0345   Sat Sep 02, 2023   0024 I met with the patient, obtained history, performed an initial exam, and discussed options and plan for diagnostics and treatment here in the ED.    0039 Seen 8/23/2023 at [place] for an episode of hypoglycemia.  Also wheezing at that time, treated with prednisone for bronchitis.   0040 7/25/2023 clinic visit note reviewed with chronic complaints of epigastric and right upper quadrant abdominal pain associated nausea and vomiting related to her gastroparesis   0113 WBC(!): 16.3  Recent steroids, also appears chronically elevated   0130 Chest XR,  PA & LAT  X-ray independently interpreted by myself without visualized infiltrate or effusion   0143 SARS CoV2 PCR: Negative   0144 Troponin T, High Sensitivity(!): 15   0344 Troponin T, High Sensitivity: 13       Medical Decision Making    History:  Supplemental history from: EMS  External Record(s) reviewed: Outpatient Record: recent ED and pcp visit  as above    Work Up:  Chart documentation includes differential considered and any EKGs or imaging independently interpreted by provider, see MDM  In additional to work up documented, I considered the following work up: see MDM    External consultation:  Discussion of management with another provider: ronal    Complicating factors:  Care impacted by chronic illness: Chronic Lung Disease, Diabetes, Hyperlipidemia, and Hypertension  Care affected by social determinants of health: Access to Medical Care weekend night shift no access to PCP    Disposition considerations: Discharge. No recommendations on prescription strength medication(s). See documentation for any additional details.        At the conclusion of the encounter I discussed the results of all of the tests and the disposition. The questions were answered. The patient or family acknowledged understanding and was agreeable with the care plan.    MEDICATIONS GIVEN IN THE EMERGENCY:  Medications - No data to display    NEW PRESCRIPTIONS STARTED AT TODAY'S ER VISIT  New Prescriptions    No medications on file          =================================================================    HPI    Patient information was obtained from: The patient    Use of Intrepreter: N/A        Rukhsana Griffith is a 62 year old female with a pertinent medical history of COPD, diabetes, hyperlipidemia, schizoaffective disorder, seizures, methamphetamine abuse who presents to the ER via ambulance for an evaluation of fall and shortness of breath.    The patient lives independently and has been complaining of dizziness with light-headedness and shortness of breath for the past 2 weeks. She called the ambulance tonight for worsening symptoms. She is complaining of epigastric pain with a distended abdomen for the past 2 weeks. She has a gastric stimulator in place with a history of cholecystectomy and appendectomy. She does use a walker to help her get around.     Otherwise, the patient  denied having cough, congestion, and any other medical complaints or concerns at this time.    PAST MEDICAL HISTORY:  Past Medical History:   Diagnosis Date    COPD (chronic obstructive pulmonary disease) (H)     Diabetes (H)     HLD (hyperlipidemia)     Methamphetamine abuse (H)     Schizoaffective disorder (H)     Seizures (H)        PAST SURGICAL HISTORY:  Past Surgical History:   Procedure Laterality Date    HYSTERECTOMY      KNEE SURGERY      OTHER SURGICAL HISTORY      gastric pacemaker    OTHER SURGICAL HISTORY      interstim placement    WI REVISE/REMOVE PERIPH/GASTRIC NEUROSTIM/ N/A 5/15/2019    Procedure: PARTIAL REMOVAL OLD LEAD;  Surgeon: Jcarlos Muñiz MD;  Location: [facility];  Service: Urology    RELEASE CARPAL TUNNEL         CURRENT MEDICATIONS:    Prior to Admission Medications   Prescriptions Last Dose Informant Patient Reported? Taking?   EPINEPHrine (EPIPEN) 0.3 mg/0.3 mL atIn   Yes No   Sig: [EPINEPHRINE (EPIPEN) 0.3 MG/0.3 ML ATIN] Inject 0.3 mg into the shoulder, thigh, or buttocks as needed.   SUMAtriptan (IMITREX) 100 MG tablet   Yes No   Sig: [SUMATRIPTAN (IMITREX) 100 MG TABLET] Take 100 mg by mouth every 2 (two) hours as needed for migraine.   acetaminophen (TYLENOL) 160 mg/5 mL (5 mL) Soln solution   Yes No   Sig: [ACETAMINOPHEN (TYLENOL) 160 MG/5 ML (5 ML) SOLN SOLUTION] Take 10 mL by mouth every 4 (four) hours as needed (Pain).          albuterol (PROAIR HFA;PROVENTIL HFA;VENTOLIN HFA) 90 mcg/actuation inhaler   Yes No   Sig: [ALBUTEROL (PROAIR HFA;PROVENTIL HFA;VENTOLIN HFA) 90 MCG/ACTUATION INHALER] Inhale 2 puffs every 6 (six) hours as needed for wheezing.   albuterol (PROVENTIL) 2.5 mg /3 mL (0.083 %) nebulizer solution   Yes No   Sig: [ALBUTEROL (PROVENTIL) 2.5 MG /3 ML (0.083 %) NEBULIZER SOLUTION] Take 2.5 mg by nebulization every 4 (four) hours as needed for wheezing.          aluminum-magnesium hydroxide-simethicone (MAALOX MAX STRENGTH) 400-400-40 mg/5 mL  suspension   Yes No   Sig: [ALUMINUM-MAGNESIUM HYDROXIDE-SIMETHICONE (MAALOX MAX STRENGTH) 400-400-40 MG/5 ML SUSPENSION] Take 5 mL by mouth every 4 (four) hours as needed for indigestion.          ammonium lactate (AMLACTIN) 12 % cream   Yes No   Sig: [AMMONIUM LACTATE (AMLACTIN) 12 % CREAM] Apply 1 application topically 2 (two) times a day.          atropine 1 % ophthalmic solution   Yes No   Sig: [ATROPINE 1 % OPHTHALMIC SOLUTION] Place 1-2 drops under the tongue 2 (two) times a day. Takes under tongue to stop drooling         budesonide-formoterol (SYMBICORT) 160-4.5 mcg/actuation inhaler   Yes No   Sig: [BUDESONIDE-FORMOTEROL (SYMBICORT) 160-4.5 MCG/ACTUATION INHALER] Inhale 2 puffs 2 (two) times a day.          cholecalciferol, vitamin D3, 2,000 unit Tab   Yes No   Sig: [CHOLECALCIFEROL, VITAMIN D3, 2,000 UNIT TAB] Take 2,000 Units by mouth daily.   cloZAPine (CLOZARIL) 100 MG tablet   Yes No   Sig: [CLOZAPINE (CLOZARIL) 100 MG TABLET] Take 200 mg by mouth at bedtime.          diphenhydrAMINE (BENADRYL) 25 mg capsule   Yes No   Sig: [DIPHENHYDRAMINE (BENADRYL) 25 MG CAPSULE] Take 25 mg by mouth at bedtime as needed for itching.   docusate sodium (COLACE) 100 MG capsule   Yes No   Sig: [DOCUSATE SODIUM (COLACE) 100 MG CAPSULE] Take 100 mg by mouth 2 (two) times a day.          escitalopram oxalate (LEXAPRO) 10 MG tablet   Yes No   Sig: [ESCITALOPRAM OXALATE (LEXAPRO) 10 MG TABLET] Take 10 mg by mouth daily.          famotidine (PEPCID) 40 MG tablet   Yes No   Sig: [FAMOTIDINE (PEPCID) 40 MG TABLET] Take 40 mg by mouth 2 (two) times a day.   fluoride, sodium, (DENTAGEL) 1.1 % Gel dental gel   Yes No   Sig: [FLUORIDE, SODIUM, (DENTAGEL) 1.1 % GEL DENTAL GEL] Apply 1 application to teeth at bedtime.   glucagon, human recombinant, (GLUCAGEN HYPOKIT) 1 mg injection   Yes No   Sig: [GLUCAGON, HUMAN RECOMBINANT, (GLUCAGEN HYPOKIT) 1 MG INJECTION] Infuse 1 mg into a venous catheter once as needed (Low blood sugars).           insulin aspart U-100 (NOVOLOG) 100 unit/mL injection   Yes No   Sig: [INSULIN ASPART U-100 (NOVOLOG) 100 UNIT/ML INJECTION] Inject 15 Units under the skin 3 (three) times a day before meals. Sliding scale          insulin glargine (LANTUS) 100 unit/mL injection   Yes No   Sig: [INSULIN GLARGINE (LANTUS) 100 UNIT/ML INJECTION] Inject 25 Units under the skin every morning.          ipratropium-albuterol (DUO-NEB) 0.5-2.5 mg/3 mL nebulizer   Yes No   Sig: [IPRATROPIUM-ALBUTEROL (DUO-NEB) 0.5-2.5 MG/3 ML NEBULIZER] Take 3 mL by nebulization 4 (four) times a day as needed.          lisinopril (PRINIVIL,ZESTRIL) 10 MG tablet   Yes No   Sig: [LISINOPRIL (PRINIVIL,ZESTRIL) 10 MG TABLET] Take 10 mg by mouth daily.   loratadine (CLARITIN) 10 mg tablet   Yes No   Sig: [LORATADINE (CLARITIN) 10 MG TABLET] Take 10 mg by mouth daily.   lubiprostone (AMITIZA) 24 MCG capsule   Yes No   Sig: [LUBIPROSTONE (AMITIZA) 24 MCG CAPSULE] Take 24 mcg by mouth 2 (two) times a day.   magnesium hydroxide (MILK OF MAG) 400 mg/5 mL Susp suspension   Yes No   Sig: [MAGNESIUM HYDROXIDE (MILK OF MAG) 400 MG/5 ML SUSP SUSPENSION] Take 30 mL by mouth 2 (two) times a day as needed.          mometasone (NASONEX) 50 mcg/actuation nasal spray   Yes No   Sig: [MOMETASONE (NASONEX) 50 MCG/ACTUATION NASAL SPRAY] 2 sprays into each nostril daily.   multivitamin with minerals (THERA-M) 9 mg iron-400 mcg Tab tablet   Yes No   Sig: [MULTIVITAMIN WITH MINERALS (THERA-M) 9 MG IRON-400 MCG TAB TABLET] Take 1 tablet by mouth daily.   nicotine (NICODERM CQ) 21 mg/24 hr   Yes No   Sig: [NICOTINE (NICODERM CQ) 21 MG/24 HR] Place 1 patch on the skin daily as needed for smoking cessation.          oxyCODONE-acetaminophen (PERCOCET/ENDOCET) 5-325 mg per tablet   No No   Sig: [OXYCODONE-ACETAMINOPHEN (PERCOCET/ENDOCET) 5-325 MG PER TABLET] Take 1 tablet by mouth every 6 (six) hours as needed for pain.   polyethylene glycol (MIRALAX) 17 gram packet   Yes No   Sig:  [POLYETHYLENE GLYCOL (MIRALAX) 17 GRAM PACKET] Take 17 g by mouth 2 (two) times a day.          pravastatin (PRAVACHOL) 40 MG tablet   Yes No   Sig: [PRAVASTATIN (PRAVACHOL) 40 MG TABLET] Take 40 mg by mouth every morning.          ramelteon (ROZEREM) 8 mg tablet   Yes No   Sig: [RAMELTEON (ROZEREM) 8 MG TABLET] Take 8 mg by mouth at bedtime.   topiramate (TOPAMAX) 200 MG tablet   Yes No   Sig: [TOPIRAMATE (TOPAMAX) 200 MG TABLET] Take 200 mg by mouth 2 (two) times a day.   triamcinolone (KENALOG) 0.1 % ointment   Yes No   Sig: [TRIAMCINOLONE (KENALOG) 0.1 % OINTMENT] Apply 1 application topically 2 (two) times a day.   trimethobenzamide (TIGAN) 300 mg capsule   Yes No   Sig: [TRIMETHOBENZAMIDE (TIGAN) 300 MG CAPSULE] Take 300 mg by mouth 3 (three) times a day as needed (N/V).      Facility-Administered Medications: None       ALLERGIES:  Allergies   Allergen Reactions    Aspirin Unknown     Other reaction(s): Bleeding    Bee Sting Kit [Bee Venom] Anaphylaxis    Botox [Onabotulinumtoxina] Hives and Shortness Of Breath    Garlic Hives    Hydrocodone Headache, Hives and Itching    Hydromorphone Other (See Comments) and Rash     Seizures    Hydroxyzine Hives, Other (See Comments), Anaphylaxis and Shortness Of Breath     Dyspnea    Hymenoptera Allergenic Extract [Wasp Venom Protein] Anaphylaxis    Iodinated Contrast Media Hives, Other (See Comments), Itching and Swelling     EDEMA    Latex Shortness Of Breath    Lidocaine Hives     lidoderm patch only; tolerates injectable lidocaine    Onion Hives     green, red, yellow, mushrooms, garlic---all cause pt to have hives    Oxycodone Hives and Rash    Procaine Hives     tolerates injectable bupivacaine and lidocaine      Sucralose Anaphylaxis    Diazepam Rash     also: parasomnia, as pt reported waking up in bathtub    Haloperidol Rash    Ketorolac Rash    Lithium Rash    Meperidine Rash    Diatrizoate Meglumine [Diatrizoate] Hives and Swelling    Green Pepper [Capsicum]  "Hives    Metformin Other (See Comments)     \"Stomach bleeding and kidney infection\"    Adhesive Tape-Silicones [Adhesive Tape] Rash    [place] [Nuts] Rash    Cephalosporins Rash    Chlorpromazine Rash     THORAZINE    Chlorpropamide Rash    Coconut (Cocos Nucifera) Rash    Codeine Headache     Migraines       Darvocet A500 [Propoxyphene N-Apap] Rash    Dipyridamole Rash    Fentanyl Rash    Fluoxetine Rash    Furosemide Rash    Glyburide Other (See Comments) and Rash     Other reaction(s): hypoglycemia (patient is very sensitive to sulfonylureas)    Ondansetron Rash    Penicillins Rash and Other (See Comments)     Migraines, dizzy and sweating    Shellfish Containing Products [Shellfish-Derived Products] Rash    Sulfa (Sulfonamide Antibiotics) [Sulfa Antibiotics] Rash    Thallium-201 [Thallous Chloride Tl 201] Rash    Thioridazine Rash    Tizanidine Rash       FAMILY HISTORY:  Family History   Adopted: Yes       SOCIAL HISTORY:  Social History     Tobacco Use    Smoking status: Every Day     Packs/day: 0.25     Types: Cigarettes    Smokeless tobacco: Never   Substance Use Topics    Alcohol use: Not Currently    Drug use: Not Currently        VITALS:  Patient Vitals for the past 24 hrs:   BP Temp Temp src Pulse Resp SpO2 Height Weight   09/02/23 0325 -- -- -- -- -- 90 % -- --   09/02/23 0230 -- -- -- 80 -- 94 % -- --   09/02/23 0100 125/59 -- -- 88 23 92 % -- --   09/02/23 0030 -- 98.2  F (36.8  C) Oral -- -- -- 1.626 m (5' 4\") 76.7 kg (169 lb)   09/02/23 0029 -- -- -- -- -- 90 % -- --   09/02/23 0026 125/58 -- -- 91 18 95 % -- --       PHYSICAL EXAM    General Appearance: Well-appearing, well-nourished, no acute distress, drowsy  Head:  Normocephalic  Eyes:  PERRL, conjunctiva/corneas clear, EOM's intact  ENT:  membranes are moist without pallor  Neck:  Supple  Cardio:  Regular rate and rhythm, no murmur/gallop/rub, 2+ pulses symmetric in all extremities  Pulm:  No respiratory distress, clear to auscultation " bilaterally, 90% on room air, breath sounds decreased  Back:  No CVA tenderness, normal ROM  Abdomen:  Soft, epigastric and RUQ tenderness over gastric stimulator, no rebound or guarding.  Extremities:  Extremities normal, atraumatic, no cyanosis or edema, full ROM and motor tone intact, bilateral pulses intact upper and lower  Skin:  Skin warm, dry, no rashes  Neuro:  oriented ×3, moving all extremities, no gross sensory defects     RADIOLOGY/LABS:  Reviewed all pertinent imaging. Please see official radiology report. All pertinent labs reviewed and interpreted.    Results for orders placed or performed during the hospital encounter of 09/02/23   Chest XR,  PA & LAT    Impression    IMPRESSION: The heart is unchanged in size and contour. There is mild central pulmonary venous congestion, the lungs are otherwise clear.   Comprehensive metabolic panel   Result Value Ref Range    Sodium 133 (L) 136 - 145 mmol/L    Potassium 3.7 3.4 - 5.3 mmol/L    Chloride 99 98 - 107 mmol/L    Carbon Dioxide (CO2) 24 22 - 29 mmol/L    Anion Gap 10 7 - 15 mmol/L    Urea Nitrogen 11.8 8.0 - 23.0 mg/dL    Creatinine 0.55 0.51 - 0.95 mg/dL    Calcium 10.0 8.8 - 10.2 mg/dL    Glucose 157 (H) 70 - 99 mg/dL    Alkaline Phosphatase 112 (H) 35 - 104 U/L    AST 27 0 - 45 U/L    ALT 22 0 - 50 U/L    Protein Total 6.8 6.4 - 8.3 g/dL    Albumin 3.7 3.5 - 5.2 g/dL    Bilirubin Total <0.2 <=1.2 mg/dL    GFR Estimate >90 >60 mL/min/1.73m2   Result Value Ref Range    Lipase 14 13 - 60 U/L   Troponin T, High Sensitivity (now)   Result Value Ref Range    Troponin T, High Sensitivity 15 (H) <=14 ng/L   UA with Microscopic reflex to Culture    Specimen: Urine, Catheter   Result Value Ref Range    Color Urine Light Yellow Colorless, Straw, Light Yellow, Yellow    Appearance Urine Clear Clear    Glucose Urine Negative Negative mg/dL    Bilirubin Urine Negative Negative    Ketones Urine Negative Negative mg/dL    Specific Gravity Urine 1.010 1.001 - 1.030     Blood Urine Negative Negative    pH Urine 7.0 5.0 - 7.0    Protein Albumin Urine Negative Negative mg/dL    Urobilinogen Urine <2.0 <2.0 mg/dL    Nitrite Urine Negative Negative    Leukocyte Esterase Urine Negative Negative    Bacteria Urine None Seen None Seen /HPF    RBC Urine <1 <=2 /HPF    WBC Urine 1 <=5 /HPF   Symptomatic COVID-19 Virus (Coronavirus) by PCR Nasopharyngeal    Specimen: Nasopharyngeal; Swab   Result Value Ref Range    SARS CoV2 PCR Negative Negative   Nt probnp inpatient   Result Value Ref Range    N terminal Pro BNP Inpatient 50 0 - 900 pg/mL   CBC with platelets and differential   Result Value Ref Range    WBC Count 16.3 (H) 4.0 - 11.0 10e3/uL    RBC Count 4.50 3.80 - 5.20 10e6/uL    Hemoglobin 13.6 11.7 - 15.7 g/dL    Hematocrit 41.8 35.0 - 47.0 %    MCV 93 78 - 100 fL    MCH 30.2 26.5 - 33.0 pg    MCHC 32.5 31.5 - 36.5 g/dL    RDW 14.9 10.0 - 15.0 %    Platelet Count 226 150 - 450 10e3/uL    % Neutrophils 66 %    % Lymphocytes 20 %    % Monocytes 11 %    % Eosinophils 1 %    % Basophils 1 %    % Immature Granulocytes 1 %    NRBCs per 100 WBC 0 <1 /100    Absolute Neutrophils 10.7 (H) 1.6 - 8.3 10e3/uL    Absolute Lymphocytes 3.3 0.8 - 5.3 10e3/uL    Absolute Monocytes 1.8 (H) 0.0 - 1.3 10e3/uL    Absolute Eosinophils 0.2 0.0 - 0.7 10e3/uL    Absolute Basophils 0.1 0.0 - 0.2 10e3/uL    Absolute Immature Granulocytes 0.2 <=0.4 10e3/uL    Absolute NRBCs 0.0 10e3/uL   Glucose by meter   Result Value Ref Range    GLUCOSE BY METER POCT 155 (H) 70 - 99 mg/dL   Troponin T, High Sensitivity (now)   Result Value Ref Range    Troponin T, High Sensitivity 13 <=14 ng/L       EKG:  Performed at: 09/02/2023    Impression: Sinus rhythm. Low voltage QRS. Borderline ECG. No significant change was found when compared with ECG of 06/24/2023 at 00:16    Rate: 90 BPM  Rhythm: Sinus rhythm  Axis: 86  MN Interval: 188 ms  QRS Interval: 80 ms  QTc Interval: 442 ms  ST Changes: No ST changes  Comparison:  06/24/2023 at 00:16  I have independently reviewed and interpreted the EKG(s) documented above.    The creation of this record is based on the scribe s observations of the work being performed by Brynn Romero MD and the provider s statements to them. It was created on her behalf by Eder Villar, a trained medical scribe. This document has been checked and approved by the attending provider.    Brynn Romero MD  Emergency Medicine  [facility] EMERGENCY DEPARTMENT  [address] [place] [id number]  [phone]  Dept: [phone]     Brynn Romero MD  09/02/23 9824

## 2023-09-02 NOTE — ED NOTES
Bed: JNED-05  Expected date: 9/2/23  Expected time: 12:14 AM  Means of arrival: Ambulance  Comments:  Tesfaye Hicks yo F dizziness

## 2023-09-04 LAB
ATRIAL RATE - MUSE: 90 BPM
DIASTOLIC BLOOD PRESSURE - MUSE: 58 MMHG
INTERPRETATION ECG - MUSE: NORMAL
P AXIS - MUSE: 67 DEGREES
PR INTERVAL - MUSE: 188 MS
QRS DURATION - MUSE: 80 MS
QT - MUSE: 362 MS
QTC - MUSE: 442 MS
R AXIS - MUSE: 86 DEGREES
SYSTOLIC BLOOD PRESSURE - MUSE: 122 MMHG
T AXIS - MUSE: 69 DEGREES
VENTRICULAR RATE- MUSE: 90 BPM

## 2023-09-11 ENCOUNTER — APPOINTMENT (OUTPATIENT)
Dept: CT IMAGING | Facility: HOSPITAL | Age: 63
End: 2023-09-11
Attending: EMERGENCY MEDICINE
Payer: MEDICAID

## 2023-09-11 ENCOUNTER — APPOINTMENT (OUTPATIENT)
Dept: RADIOLOGY | Facility: HOSPITAL | Age: 63
End: 2023-09-11
Attending: EMERGENCY MEDICINE
Payer: MEDICAID

## 2023-09-11 ENCOUNTER — HOSPITAL ENCOUNTER (INPATIENT)
Facility: HOSPITAL | Age: 63
LOS: 1 days | Discharge: HOME | End: 2023-09-12
Attending: EMERGENCY MEDICINE | Admitting: FAMILY MEDICINE
Payer: MEDICAID

## 2023-09-11 DIAGNOSIS — R09.02 HYPOXIA: ICD-10-CM

## 2023-09-11 DIAGNOSIS — Z87.828 CHRONIC SEIZURE DISORDER WITH HISTORY OF HEAD TRAUMA (H): ICD-10-CM

## 2023-09-11 DIAGNOSIS — R55 EPISODE OF LOSS OF CONSCIOUSNESS: ICD-10-CM

## 2023-09-11 DIAGNOSIS — G40.909 CHRONIC SEIZURE DISORDER WITH HISTORY OF HEAD TRAUMA (H): ICD-10-CM

## 2023-09-11 LAB
ALBUMIN SERPL BCG-MCNC: 3.8 G/DL (ref 3.5–5.2)
ALBUMIN UR-MCNC: NEGATIVE MG/DL
ALP SERPL-CCNC: 128 U/L (ref 35–104)
ALT SERPL W P-5'-P-CCNC: 18 U/L (ref 0–50)
AMPHETAMINES UR QL SCN: NORMAL
ANION GAP SERPL CALCULATED.3IONS-SCNC: 9 MMOL/L (ref 7–15)
APPEARANCE UR: CLEAR
AST SERPL W P-5'-P-CCNC: 26 U/L (ref 0–45)
BACTERIA #/AREA URNS HPF: ABNORMAL /HPF
BARBITURATES UR QL SCN: NORMAL
BASOPHILS # BLD AUTO: 0.1 10E3/UL (ref 0–0.2)
BASOPHILS NFR BLD AUTO: 1 %
BENZODIAZ UR QL SCN: NORMAL
BILIRUB SERPL-MCNC: <0.2 MG/DL
BILIRUB UR QL STRIP: NEGATIVE
BUN SERPL-MCNC: 9 MG/DL (ref 8–23)
BZE UR QL SCN: NORMAL
CALCIUM SERPL-MCNC: 10.4 MG/DL (ref 8.8–10.2)
CANNABINOIDS UR QL SCN: NORMAL
CHLORIDE SERPL-SCNC: 99 MMOL/L (ref 98–107)
COLOR UR AUTO: COLORLESS
CREAT SERPL-MCNC: 0.55 MG/DL (ref 0.51–0.95)
DEPRECATED HCO3 PLAS-SCNC: 23 MMOL/L (ref 22–29)
EGFRCR SERPLBLD CKD-EPI 2021: >90 ML/MIN/1.73M2
EOSINOPHIL # BLD AUTO: 0.2 10E3/UL (ref 0–0.7)
EOSINOPHIL NFR BLD AUTO: 1 %
ERYTHROCYTE [DISTWIDTH] IN BLOOD BY AUTOMATED COUNT: 15.3 % (ref 10–15)
ETHANOL SERPL-MCNC: <0.01 G/DL
GLUCOSE SERPL-MCNC: 139 MG/DL (ref 70–99)
GLUCOSE UR STRIP-MCNC: NEGATIVE MG/DL
HCT VFR BLD AUTO: 43 % (ref 35–47)
HGB BLD-MCNC: 14.2 G/DL (ref 11.7–15.7)
HGB UR QL STRIP: NEGATIVE
IMM GRANULOCYTES # BLD: 0.2 10E3/UL
IMM GRANULOCYTES NFR BLD: 1 %
INR PPP: 0.9 (ref 0.85–1.15)
KETONES UR STRIP-MCNC: NEGATIVE MG/DL
LACTATE SERPL-SCNC: 1 MMOL/L (ref 0.7–2)
LEUKOCYTE ESTERASE UR QL STRIP: NEGATIVE
LYMPHOCYTES # BLD AUTO: 3.4 10E3/UL (ref 0.8–5.3)
LYMPHOCYTES NFR BLD AUTO: 21 %
MAGNESIUM SERPL-MCNC: 1.7 MG/DL (ref 1.7–2.3)
MCH RBC QN AUTO: 30.2 PG (ref 26.5–33)
MCHC RBC AUTO-ENTMCNC: 33 G/DL (ref 31.5–36.5)
MCV RBC AUTO: 92 FL (ref 78–100)
MONOCYTES # BLD AUTO: 1.6 10E3/UL (ref 0–1.3)
MONOCYTES NFR BLD AUTO: 10 %
MUCOUS THREADS #/AREA URNS LPF: PRESENT /LPF
NEUTROPHILS # BLD AUTO: 10.8 10E3/UL (ref 1.6–8.3)
NEUTROPHILS NFR BLD AUTO: 66 %
NITRATE UR QL: NEGATIVE
NRBC # BLD AUTO: 0 10E3/UL
NRBC BLD AUTO-RTO: 0 /100
OPIATES UR QL SCN: NORMAL
PCP QUAL URINE (ROCHE): NORMAL
PH UR STRIP: 7 [PH] (ref 5–7)
PLATELET # BLD AUTO: 263 10E3/UL (ref 150–450)
POTASSIUM SERPL-SCNC: 4 MMOL/L (ref 3.4–5.3)
PROT SERPL-MCNC: 7.1 G/DL (ref 6.4–8.3)
RBC # BLD AUTO: 4.7 10E6/UL (ref 3.8–5.2)
RBC URINE: 0 /HPF
SODIUM SERPL-SCNC: 131 MMOL/L (ref 136–145)
SP GR UR STRIP: 1.01 (ref 1–1.03)
SQUAMOUS EPITHELIAL: 2 /HPF
TROPONIN T SERPL HS-MCNC: 13 NG/L
TSH SERPL DL<=0.005 MIU/L-ACNC: 1.76 UIU/ML (ref 0.3–4.2)
UROBILINOGEN UR STRIP-MCNC: <2 MG/DL
WBC # BLD AUTO: 16.2 10E3/UL (ref 4–11)
WBC URINE: <1 /HPF

## 2023-09-11 PROCEDURE — 71045 X-RAY EXAM CHEST 1 VIEW: CPT

## 2023-09-11 PROCEDURE — 83605 ASSAY OF LACTIC ACID: CPT | Performed by: EMERGENCY MEDICINE

## 2023-09-11 PROCEDURE — 80201 ASSAY OF TOPIRAMATE: CPT | Performed by: EMERGENCY MEDICINE

## 2023-09-11 PROCEDURE — 96361 HYDRATE IV INFUSION ADD-ON: CPT

## 2023-09-11 PROCEDURE — 80053 COMPREHEN METABOLIC PANEL: CPT | Performed by: EMERGENCY MEDICINE

## 2023-09-11 PROCEDURE — 99285 EMERGENCY DEPT VISIT HI MDM: CPT | Mod: 25

## 2023-09-11 PROCEDURE — 84443 ASSAY THYROID STIM HORMONE: CPT | Performed by: EMERGENCY MEDICINE

## 2023-09-11 PROCEDURE — 96360 HYDRATION IV INFUSION INIT: CPT

## 2023-09-11 PROCEDURE — 85610 PROTHROMBIN TIME: CPT | Performed by: EMERGENCY MEDICINE

## 2023-09-11 PROCEDURE — 85004 AUTOMATED DIFF WBC COUNT: CPT | Performed by: EMERGENCY MEDICINE

## 2023-09-11 PROCEDURE — 81001 URINALYSIS AUTO W/SCOPE: CPT | Performed by: EMERGENCY MEDICINE

## 2023-09-11 PROCEDURE — 258N000003 HC RX IP 258 OP 636: Performed by: EMERGENCY MEDICINE

## 2023-09-11 PROCEDURE — 72125 CT NECK SPINE W/O DYE: CPT

## 2023-09-11 PROCEDURE — 82077 ASSAY SPEC XCP UR&BREATH IA: CPT | Performed by: EMERGENCY MEDICINE

## 2023-09-11 PROCEDURE — 72131 CT LUMBAR SPINE W/O DYE: CPT

## 2023-09-11 PROCEDURE — 72128 CT CHEST SPINE W/O DYE: CPT

## 2023-09-11 PROCEDURE — 70450 CT HEAD/BRAIN W/O DYE: CPT

## 2023-09-11 PROCEDURE — 80307 DRUG TEST PRSMV CHEM ANLYZR: CPT | Performed by: EMERGENCY MEDICINE

## 2023-09-11 PROCEDURE — 83735 ASSAY OF MAGNESIUM: CPT | Performed by: EMERGENCY MEDICINE

## 2023-09-11 PROCEDURE — 84484 ASSAY OF TROPONIN QUANT: CPT | Performed by: EMERGENCY MEDICINE

## 2023-09-11 PROCEDURE — 36415 COLL VENOUS BLD VENIPUNCTURE: CPT | Performed by: EMERGENCY MEDICINE

## 2023-09-11 PROCEDURE — 93005 ELECTROCARDIOGRAM TRACING: CPT | Performed by: STUDENT IN AN ORGANIZED HEALTH CARE EDUCATION/TRAINING PROGRAM

## 2023-09-11 RX ADMIN — SODIUM CHLORIDE 1000 ML: 9 INJECTION, SOLUTION INTRAVENOUS at 22:31

## 2023-09-11 ASSESSMENT — ACTIVITIES OF DAILY LIVING (ADL): ADLS_ACUITY_SCORE: 35

## 2023-09-12 VITALS
TEMPERATURE: 98.2 F | RESPIRATION RATE: 18 BRPM | HEIGHT: 62 IN | SYSTOLIC BLOOD PRESSURE: 130 MMHG | BODY MASS INDEX: 27.97 KG/M2 | OXYGEN SATURATION: 91 % | DIASTOLIC BLOOD PRESSURE: 101 MMHG | HEART RATE: 96 BPM | WEIGHT: 152 LBS

## 2023-09-12 PROBLEM — R55 EPISODE OF LOSS OF CONSCIOUSNESS: Status: ACTIVE | Noted: 2023-09-12

## 2023-09-12 PROBLEM — Z87.828: Status: ACTIVE | Noted: 2023-09-12

## 2023-09-12 PROBLEM — G40.909: Status: ACTIVE | Noted: 2023-09-12

## 2023-09-12 LAB
ANION GAP SERPL CALCULATED.3IONS-SCNC: 8 MMOL/L (ref 7–15)
ATRIAL RATE - MUSE: 90 BPM
BASE EXCESS BLDV CALC-SCNC: -1.1 MMOL/L
BUN SERPL-MCNC: 8.1 MG/DL (ref 8–23)
CALCIUM SERPL-MCNC: 9.6 MG/DL (ref 8.8–10.2)
CHLORIDE SERPL-SCNC: 105 MMOL/L (ref 98–107)
CREAT SERPL-MCNC: 0.56 MG/DL (ref 0.51–0.95)
DEPRECATED HCO3 PLAS-SCNC: 24 MMOL/L (ref 22–29)
DIASTOLIC BLOOD PRESSURE - MUSE: 66 MMHG
EGFRCR SERPLBLD CKD-EPI 2021: >90 ML/MIN/1.73M2
ERYTHROCYTE [DISTWIDTH] IN BLOOD BY AUTOMATED COUNT: 15.4 % (ref 10–15)
GLUCOSE BLDC GLUCOMTR-MCNC: 153 MG/DL (ref 70–99)
GLUCOSE BLDC GLUCOMTR-MCNC: 69 MG/DL (ref 70–99)
GLUCOSE SERPL-MCNC: 135 MG/DL (ref 70–99)
HBA1C MFR BLD: 7 %
HCO3 BLDV-SCNC: 26 MMOL/L (ref 24–30)
HCT VFR BLD AUTO: 45.2 % (ref 35–47)
HGB BLD-MCNC: 14.2 G/DL (ref 11.7–15.7)
INTERPRETATION ECG - MUSE: NORMAL
MCH RBC QN AUTO: 29.6 PG (ref 26.5–33)
MCHC RBC AUTO-ENTMCNC: 31.4 G/DL (ref 31.5–36.5)
MCV RBC AUTO: 94 FL (ref 78–100)
OXYHGB MFR BLDV: 78.6 % (ref 70–75)
P AXIS - MUSE: 76 DEGREES
PCO2 BLDV: 56 MM HG (ref 35–50)
PH BLDV: 7.27 [PH] (ref 7.35–7.45)
PLATELET # BLD AUTO: 244 10E3/UL (ref 150–450)
PO2 BLDV: 54 MM HG (ref 25–47)
POTASSIUM SERPL-SCNC: 4.1 MMOL/L (ref 3.4–5.3)
PR INTERVAL - MUSE: 208 MS
QRS DURATION - MUSE: 82 MS
QT - MUSE: 368 MS
QTC - MUSE: 450 MS
R AXIS - MUSE: 85 DEGREES
RBC # BLD AUTO: 4.79 10E6/UL (ref 3.8–5.2)
SAO2 % BLDV: 89 % (ref 70–75)
SARS-COV-2 RNA RESP QL NAA+PROBE: NEGATIVE
SODIUM SERPL-SCNC: 137 MMOL/L (ref 136–145)
SYSTOLIC BLOOD PRESSURE - MUSE: 143 MMHG
T AXIS - MUSE: 74 DEGREES
VENTRICULAR RATE- MUSE: 90 BPM
WBC # BLD AUTO: 13.3 10E3/UL (ref 4–11)

## 2023-09-12 PROCEDURE — 99223 1ST HOSP IP/OBS HIGH 75: CPT | Performed by: PSYCHIATRY & NEUROLOGY

## 2023-09-12 PROCEDURE — 99222 1ST HOSP IP/OBS MODERATE 55: CPT | Mod: GC | Performed by: FAMILY MEDICINE

## 2023-09-12 PROCEDURE — 85027 COMPLETE CBC AUTOMATED: CPT | Performed by: MASSAGE THERAPIST

## 2023-09-12 PROCEDURE — 120N000001 HC R&B MED SURG/OB

## 2023-09-12 PROCEDURE — 80048 BASIC METABOLIC PNL TOTAL CA: CPT | Performed by: MASSAGE THERAPIST

## 2023-09-12 PROCEDURE — 36415 COLL VENOUS BLD VENIPUNCTURE: CPT | Performed by: MASSAGE THERAPIST

## 2023-09-12 PROCEDURE — 87635 SARS-COV-2 COVID-19 AMP PRB: CPT | Performed by: MASSAGE THERAPIST

## 2023-09-12 PROCEDURE — 36415 COLL VENOUS BLD VENIPUNCTURE: CPT | Performed by: EMERGENCY MEDICINE

## 2023-09-12 PROCEDURE — 83036 HEMOGLOBIN GLYCOSYLATED A1C: CPT | Performed by: MASSAGE THERAPIST

## 2023-09-12 PROCEDURE — 82805 BLOOD GASES W/O2 SATURATION: CPT | Performed by: EMERGENCY MEDICINE

## 2023-09-12 PROCEDURE — 82962 GLUCOSE BLOOD TEST: CPT

## 2023-09-12 RX ORDER — ENOXAPARIN SODIUM 100 MG/ML
40 INJECTION SUBCUTANEOUS EVERY 24 HOURS
Status: DISCONTINUED | OUTPATIENT
Start: 2023-09-12 | End: 2023-09-12 | Stop reason: HOSPADM

## 2023-09-12 RX ORDER — DEXTROSE MONOHYDRATE 25 G/50ML
25-50 INJECTION, SOLUTION INTRAVENOUS
Status: DISCONTINUED | OUTPATIENT
Start: 2023-09-12 | End: 2023-09-12 | Stop reason: HOSPADM

## 2023-09-12 RX ORDER — NICOTINE POLACRILEX 4 MG
15-30 LOZENGE BUCCAL
Status: DISCONTINUED | OUTPATIENT
Start: 2023-09-12 | End: 2023-09-12 | Stop reason: HOSPADM

## 2023-09-12 RX ORDER — ACETAMINOPHEN 325 MG/1
650 TABLET ORAL EVERY 6 HOURS PRN
Status: DISCONTINUED | OUTPATIENT
Start: 2023-09-12 | End: 2023-09-12 | Stop reason: HOSPADM

## 2023-09-12 RX ORDER — ACETAMINOPHEN 650 MG/1
650 SUPPOSITORY RECTAL EVERY 6 HOURS PRN
Status: DISCONTINUED | OUTPATIENT
Start: 2023-09-12 | End: 2023-09-12 | Stop reason: HOSPADM

## 2023-09-12 RX ORDER — LIDOCAINE 40 MG/G
CREAM TOPICAL
Status: DISCONTINUED | OUTPATIENT
Start: 2023-09-12 | End: 2023-09-12 | Stop reason: HOSPADM

## 2023-09-12 ASSESSMENT — ACTIVITIES OF DAILY LIVING (ADL)
ADLS_ACUITY_SCORE: 35

## 2023-09-12 NOTE — ED TRIAGE NOTES
Patient arrives via [facility].  Medics called by patient d/t seizure activity. Medics found patient sitting in chair flailing arms and telling them she was currently seizing and had been for the past four days.     Patient does have lump on left side of head. States she doesn't remember falling. Medics placed C-collar at that time.
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
## 2023-09-12 NOTE — ED PROVIDER NOTES
EMERGENCY DEPARTMENT ENCOUNTER      NAME: Rukhsana Griffith  AGE: 62 year old female  YOB: 1960  MRN: [id number]  EVALUATION DATE & TIME: 9/11/2023  9:46 PM    PCP: Kei De Leon    ED PROVIDER: Grazyna Parra MD      Chief Complaint   Patient presents with    Fall    Seizures         FINAL IMPRESSION:  1. Hypoxia    2. Episode of loss of consciousness    3. Chronic seizure disorder with history of head trauma (H)          ED COURSE & MEDICAL DECISION MAKING:    Pertinent Labs & Imaging studies reviewed. (See chart for details)    9:54 PM I introduced myself to the patient, obtained patient history, performed a physical exam, and discussed plan for ED workup including potential diagnostic laboratory/imaging studies and interventions.    62 year old female with a pertinent history of diabetes type II, chronic abdominal pain, HLD, methamphetamine abuse, schizoaffective disorder, hyperlipidemia, seizure disorder, COPD, HTN who presents to this ED for evaluation of fall. Patient is reporting multiple episodes where she is losing consciousness without warning.  She states these are seizures which she reports a history of.  She states she can tell she is having a seizure with how she awakes on the floor.  However no one has witnessed these episodes.  She is fallen multiple times that she cannot quantify over the past 4 days.  Lives alone.  On exam, she does not have any obvious sign of tongue bite.  She denies bowel or bladder incontinence.  Does have a small hematoma to the occiput.  Also reports lower back pain with some diffuse tenderness in the area.  With her presentation, did have concern for the potential of recurrent seizures, syncope, cardiac arrhythmia or other cardiac etiology, intracranial hemorrhage, spinal injury from the fall, potential hypoxia leading to loss of consciousness, hypoglycemia, electrolyte abnormality, etc.    Blood sugar here within normal limits at 139.  EKG  obtained which does not reveal any evidence of acute ischemia.  No sign of WW, Brugada syndrome, or LVH.  No obvious sign of arrhythmia.  Initially she does appear somewhat tired but is answering questions appropriately.  Does not obviously appear postictal.  Work-up was initiated and then she began to become more somnolent.  She was sleeping and was arousable but difficult to arouse.  She also was hypoxic to the upper 80s while asleep and thus did place her on 3 L nasal cannula with improvement.  Did not have respiratory distress during this time.  Do question potential sleep apnea.  VBG was added which revealed a pH of 7.27 with PCO2 of 56 and a bicarb of 26.  Not significantly high enough to warrant BiPAP at this time but do question whether this may be adding to her episodes.  Uncertain if these are seizures.  Also considered syncope.  Initial troponin within normal limits.  TSH and magnesium within normal limits.  CMP reveals a sodium of 131.  Otherwise largely unremarkable.  CBC reveals episode of 16.2 however it appears it has been chronically elevated and she also had a recent course of steroids for COPD exacerbation.  Hemoglobin 14.2.  Lactic acid within normal limits.  Alcohol level less than 0.01.  She states she has been sober for many years from any illicit substances.  Urine drug screen also negative.  Urinalysis unremarkable.  Obtain CT of the head cervical spine thoracic spine lumbar spine and chest x-ray.  These did not reveal any evidence of acute injury.  No intracranial hemorrhage. Chest x-ray reveals a stable cardiomediastinal silhouette which is borderline enlarged.  Stable mild central pulmonary vascular congestion.  No focal pulmonary consolidation or significant pleural effusion.  No pneumothorax.  No acute osseous abnormality.    At this point with her altered mental status and hypoxia and these recurrent episodes do feel she warrants admission for further evaluation.  May potentially need  neurology evaluation for possible recurrent seizures.  Topiramate level is pending at this time.  She does not have obvious sign of seizure activity here.  Again she is arousable and able to speak in full sentences when she awakens just difficult to awaken.  She is protecting her airway at this time and does not require intubation.  Patient was in agreement with admission at this time.  Spoke to the medicine team who excepted the patient.  Patient was admitted in stable condition.         At the conclusion of the encounter I discussed the results of all of the tests and the disposition. The questions were answered. The patient or family acknowledged understanding and was agreeable with the care plan.           Medical Decision Making    History:  Supplemental history from: Documented in chart, if applicable  External Record(s) reviewed: Documented in chart, if applicable.    Work Up:  Chart documentation includes differential considered and any EKGs or imaging independently interpreted by provider.  In additional to work up documented, I considered the following work up: Documented in chart, if applicable.    External consultation:  Discussion of management with another provider: Documented in chart, if applicable    Complicating factors:  Care impacted by chronic illness: Hypertension  Care affected by social determinants of health: N/A    Disposition considerations: Admit.      MEDICATIONS GIVEN IN THE EMERGENCY:  Medications   0.9% sodium chloride BOLUS (0 mLs Intravenous Stopped 9/12/23 0015)       NEW PRESCRIPTIONS STARTED AT TODAY'S ER VISIT  Discharge Medication List as of 9/12/2023 10:08 AM             =================================================================    HPI    Patient information was obtained from: patient    Use of : N/A         Rukhsana Griffith is a 62 year old female with a pertinent history of diabetes type II, chronic abdominal pain, HLD, methamphetamine abuse,  "schizoaffective disorder, hyperlipidemia, seizure disorder, COPD, HTN who presents to this ED for evaluation of fall.    Patient reports a fall while having seizure tonight. She sustained a bump on the left side of her head. Patient did not bite her tongue. She felt lightheaded following the episode. She states she has been seizing for the last 4 days, unwitnessed. They come on randomly. She doesn't get a warning or aura prior to when it happens but states she knows she had a seizure based on the \"position\" she is laying afterwards. She had another fall with one of her seizures the other day which she developed a bump on her right forehead. Patient called EMS tonight.     She takes 200 mg of Topamax to control her seizures but has an appointment on Thursday with her neurologist because she wants to increase the dose as the normal dose are no longer helping. The last time she had a seizure was back in January. Patient also notes that since starting famotidine, her seizures are more frequent.     In the ED, patient states she has a migraine and low back pain from the fall. Denies chest pain, shortness of breath, abdominal pain, nausea, vomiting, diarrhea.  She denies urine or bladder incontinence. She reports with these episodes she feels no warning, just \"wakes up on the floor.\"       REVIEW OF SYSTEMS   Review of Systems   Pertinent positives and negatives are documented in the HPI. All other systems reviewed and are negative.      PAST MEDICAL HISTORY:  Past Medical History:   Diagnosis Date    COPD (chronic obstructive pulmonary disease) (H)     Diabetes (H)     HLD (hyperlipidemia)     Methamphetamine abuse (H)     Schizoaffective disorder (H)     Seizures (H)        PAST SURGICAL HISTORY:  Past Surgical History:   Procedure Laterality Date    HYSTERECTOMY      KNEE SURGERY      OTHER SURGICAL HISTORY      gastric pacemaker    OTHER SURGICAL HISTORY      interstim placement    SD REVISE/REMOVE PERIPH/GASTRIC " NEUROSTIM/ N/A 5/15/2019    Procedure: PARTIAL REMOVAL OLD LEAD;  Surgeon: Jcarlos Muñiz MD;  Location: [facility] OR;  Service: Urology    RELEASE CARPAL TUNNEL             CURRENT MEDICATIONS:    acetaminophen (TYLENOL) 160 mg/5 mL (5 mL) Soln solution  albuterol (PROAIR HFA;PROVENTIL HFA;VENTOLIN HFA) 90 mcg/actuation inhaler  albuterol (PROVENTIL) 2.5 mg /3 mL (0.083 %) nebulizer solution  aluminum-magnesium hydroxide-simethicone (MAALOX MAX STRENGTH) 400-400-40 mg/5 mL suspension  ammonium lactate (AMLACTIN) 12 % cream  atropine 1 % ophthalmic solution  budesonide-formoterol (SYMBICORT) 160-4.5 mcg/actuation inhaler  cholecalciferol, vitamin D3, 2,000 unit Tab  cloZAPine (CLOZARIL) 100 MG tablet  diphenhydrAMINE (BENADRYL) 25 mg capsule  docusate sodium (COLACE) 100 MG capsule  EPINEPHrine (EPIPEN) 0.3 mg/0.3 mL atIn  escitalopram oxalate (LEXAPRO) 10 MG tablet  famotidine (PEPCID) 40 MG tablet  fluoride, sodium, (DENTAGEL) 1.1 % Gel dental gel  glucagon, human recombinant, (GLUCAGEN HYPOKIT) 1 mg injection  insulin aspart U-100 (NOVOLOG) 100 unit/mL injection  insulin glargine (LANTUS) 100 unit/mL injection  ipratropium-albuterol (DUO-NEB) 0.5-2.5 mg/3 mL nebulizer  lisinopril (PRINIVIL,ZESTRIL) 10 MG tablet  loratadine (CLARITIN) 10 mg tablet  lubiprostone (AMITIZA) 24 MCG capsule  magnesium hydroxide (MILK OF MAG) 400 mg/5 mL Susp suspension  mometasone (NASONEX) 50 mcg/actuation nasal spray  multivitamin with minerals (THERA-M) 9 mg iron-400 mcg Tab tablet  nicotine (NICODERM CQ) 21 mg/24 hr  oxyCODONE-acetaminophen (PERCOCET/ENDOCET) 5-325 mg per tablet  polyethylene glycol (MIRALAX) 17 gram packet  pravastatin (PRAVACHOL) 40 MG tablet  ramelteon (ROZEREM) 8 mg tablet  SUMAtriptan (IMITREX) 100 MG tablet  topiramate (TOPAMAX) 200 MG tablet  triamcinolone (KENALOG) 0.1 % ointment  trimethobenzamide (TIGAN) 300 mg capsule        ALLERGIES:  Allergies   Allergen Reactions    Aspirin Unknown     " Other reaction(s): Bleeding    Bee Sting Kit [Bee Venom] Anaphylaxis    Botox [Onabotulinumtoxina] Hives and Shortness Of Breath    Garlic Hives    Hydrocodone Headache, Hives and Itching    Hydromorphone Other (See Comments) and Rash     Seizures    Hydroxyzine Hives, Other (See Comments), Anaphylaxis and Shortness Of Breath     Dyspnea    Hymenoptera Allergenic Extract [Wasp Venom Protein] Anaphylaxis    Iodinated Contrast Media Hives, Other (See Comments), Itching and Swelling     EDEMA    Latex Shortness Of Breath    Lidocaine Hives     lidoderm patch only; tolerates injectable lidocaine    Onion Hives     green, red, yellow, mushrooms, garlic---all cause pt to have hives    Oxycodone Hives and Rash    Procaine Hives     tolerates injectable bupivacaine and lidocaine      Sucralose Anaphylaxis    Diazepam Rash     also: parasomnia, as pt reported waking up in bathtub    Haloperidol Rash    Ketorolac Rash    Lithium Rash    Meperidine Rash    Diatrizoate Meglumine [Diatrizoate] Hives and Swelling    Green Pepper [Capsicum] Hives    Metformin Other (See Comments)     \"Stomach bleeding and kidney infection\"    Adhesive Tape-Silicones [Adhesive Tape] Rash    [place] [Nuts] Rash    Cephalosporins Rash    Chlorpromazine Rash     THORAZINE    Chlorpropamide Rash    Coconut (Cocos Nucifera) Rash    Codeine Headache     Migraines       Darvocet A500 [Propoxyphene N-Apap] Rash    Dipyridamole Rash    Fentanyl Rash    Fluoxetine Rash    Furosemide Rash    Glyburide Other (See Comments) and Rash     Other reaction(s): hypoglycemia (patient is very sensitive to sulfonylureas)    Ondansetron Rash    Penicillins Rash and Other (See Comments)     Migraines, dizzy and sweating    Shellfish Containing Products [Shellfish-Derived Products] Rash    Sulfa (Sulfonamide Antibiotics) [Sulfa Antibiotics] Rash    Thallium-201 [Thallous Chloride Tl 201] Rash    Thioridazine Rash    Tizanidine Rash       FAMILY HISTORY:  Family History " "  Adopted: Yes       SOCIAL HISTORY:   Social History     Socioeconomic History    Marital status:    Tobacco Use    Smoking status: Every Day     Packs/day: 0.25     Types: Cigarettes    Smokeless tobacco: Never   Substance and Sexual Activity    Alcohol use: Not Currently    Drug use: Not Currently       VITALS:  BP (!) 130/101   Pulse 96   Temp 98.2  F (36.8  C) (Oral)   Resp 18   Ht 1.575 m (5' 2\")   Wt 68.9 kg (152 lb)   SpO2 91%   BMI 27.80 kg/m      PHYSICAL EXAM    Physical Exam  Constitutional: Well developed, Well nourished, NAD  HENT: Normocephalic, Small posterior occiput hematoma, no lacerations, midface stable, no tongue bite, no septal hematoma, no hemotympanum bilaterally, Bilateral external ears normal, Oropharynx normal, mucous membranes moist, Nose normal. Neck-  Normal range of motion, No tenderness, Supple, No stridor.    Eyes: PERRL, EOMI, Conjunctiva normal, No discharge.   Respiratory: Normal breath sounds, No respiratory distress, No wheezing or crackles, Speaks in full sentences easily.    Cardiovascular: Normal heart rate, Regular rhythm, No murmurs, No rubs, No gallops. 2+ radial pulses bilaterally. No reproducible chest tenderness.    GI: Bowel sounds normal, Soft, No tenderness, No masses, No rebound or guarding. No CVA tenderness bilaterally  Musculoskeletal: 2+ DP pulses. No notable lower extremity edema. Good range of motion in all major joints. No tenderness to palpation or major deformities noted. Mild diffuse tenderness of the lumbar spine, no midline tenderness of the CTS spine. Pelvis is stable and non tender to compression. Compartment are soft and compressible.    Integument: Warm, Dry, No erythema, No rash. No petechiae.   Neurologic: initially somewhat tired appearing but awake and alert, oriented x 3, 5/5 strength in all 4 extremities bilaterally. Sensation intact to light touch in all 4 extremities and the face bilaterally. No focal deficits noted.  "   Psychiatric: Cooperative.      LAB:  All pertinent labs reviewed and interpreted.  Results for orders placed or performed during the hospital encounter of 09/11/23   Head CT w/o contrast    Impression    IMPRESSION:  HEAD CT:  1.  No acute intracranial process.    CERVICAL SPINE CT:  Moderate motion degradation.  1.  Allowing for motion, no convincing fracture. If clinical concern for acute cervical spine fracture persists, consider repeat examination.   Cervical spine CT w/o contrast    Impression    IMPRESSION:  HEAD CT:  1.  No acute intracranial process.    CERVICAL SPINE CT:  Moderate motion degradation.  1.  Allowing for motion, no convincing fracture. If clinical concern for acute cervical spine fracture persists, consider repeat examination.   CT Thoracic Spine w/o Contrast    Impression    IMPRESSION:  1.  No acute thoracolumbar spine fracture.   Lumbar spine CT w/o contrast    Impression    IMPRESSION:  1.  No acute thoracolumbar spine fracture.   XR Chest 1 View    Impression    IMPRESSION: Stable cardiomediastinal silhouette which is borderline enlarged. Stable mild central pulmonary vascular congestion. No focal pulmonary consolidation or significant pleural effusion. Atherosclerotic calcifications of the aortic arch. No   pneumothorax. No acute osseous abnormality.   Comprehensive metabolic panel   Result Value Ref Range    Sodium 131 (L) 136 - 145 mmol/L    Potassium 4.0 3.4 - 5.3 mmol/L    Chloride 99 98 - 107 mmol/L    Carbon Dioxide (CO2) 23 22 - 29 mmol/L    Anion Gap 9 7 - 15 mmol/L    Urea Nitrogen 9.0 8.0 - 23.0 mg/dL    Creatinine 0.55 0.51 - 0.95 mg/dL    Calcium 10.4 (H) 8.8 - 10.2 mg/dL    Glucose 139 (H) 70 - 99 mg/dL    Alkaline Phosphatase 128 (H) 35 - 104 U/L    AST 26 0 - 45 U/L    ALT 18 0 - 50 U/L    Protein Total 7.1 6.4 - 8.3 g/dL    Albumin 3.8 3.5 - 5.2 g/dL    Bilirubin Total <0.2 <=1.2 mg/dL    GFR Estimate >90 >60 mL/min/1.73m2   Lactic acid whole blood   Result Value Ref  Range    Lactic Acid 1.0 0.7 - 2.0 mmol/L   Result Value Ref Range    INR 0.90 0.85 - 1.15   Result Value Ref Range    Magnesium 1.7 1.7 - 2.3 mg/dL   TSH with free T4 reflex   Result Value Ref Range    TSH 1.76 0.30 - 4.20 uIU/mL   Ethyl Alcohol Level   Result Value Ref Range    Alcohol ethyl <0.01 <=0.01 g/dL   UA with Microscopic reflex to Culture    Specimen: Urine, Clean Catch   Result Value Ref Range    Color Urine Colorless Colorless, Straw, Light Yellow, Yellow    Appearance Urine Clear Clear    Glucose Urine Negative Negative mg/dL    Bilirubin Urine Negative Negative    Ketones Urine Negative Negative mg/dL    Specific Gravity Urine 1.006 1.001 - 1.030    Blood Urine Negative Negative    pH Urine 7.0 5.0 - 7.0    Protein Albumin Urine Negative Negative mg/dL    Urobilinogen Urine <2.0 <2.0 mg/dL    Nitrite Urine Negative Negative    Leukocyte Esterase Urine Negative Negative    Bacteria Urine Few (A) None Seen /HPF    Mucus Urine Present (A) None Seen /LPF    RBC Urine 0 <=2 /HPF    WBC Urine <1 <=5 /HPF    Squamous Epithelials Urine 2 (H) <=1 /HPF   Result Value Ref Range    Troponin T, High Sensitivity 13 <=14 ng/L   Drug abuse screen 77 urine (FL, RH, SH)   Result Value Ref Range    Amphetamines Urine Screen Negative Screen Negative    Barbituates Urine Screen Negative Screen Negative    Benzodiazepine Urine Screen Negative Screen Negative    Cannabinoids Urine Screen Negative Screen Negative    Opiates Urine Screen Negative Screen Negative    PCP Urine Screen Negative Screen Negative    Cocaine Urine Screen Negative Screen Negative   CBC with platelets and differential   Result Value Ref Range    WBC Count 16.2 (H) 4.0 - 11.0 10e3/uL    RBC Count 4.70 3.80 - 5.20 10e6/uL    Hemoglobin 14.2 11.7 - 15.7 g/dL    Hematocrit 43.0 35.0 - 47.0 %    MCV 92 78 - 100 fL    MCH 30.2 26.5 - 33.0 pg    MCHC 33.0 31.5 - 36.5 g/dL    RDW 15.3 (H) 10.0 - 15.0 %    Platelet Count 263 150 - 450 10e3/uL    % Neutrophils  66 %    % Lymphocytes 21 %    % Monocytes 10 %    % Eosinophils 1 %    % Basophils 1 %    % Immature Granulocytes 1 %    NRBCs per 100 WBC 0 <1 /100    Absolute Neutrophils 10.8 (H) 1.6 - 8.3 10e3/uL    Absolute Lymphocytes 3.4 0.8 - 5.3 10e3/uL    Absolute Monocytes 1.6 (H) 0.0 - 1.3 10e3/uL    Absolute Eosinophils 0.2 0.0 - 0.7 10e3/uL    Absolute Basophils 0.1 0.0 - 0.2 10e3/uL    Absolute Immature Granulocytes 0.2 <=0.4 10e3/uL    Absolute NRBCs 0.0 10e3/uL   Result Value Ref Range    Topiramate 15.2 5.0 - 20.0 ug/mL   Blood gas venous   Result Value Ref Range    pH Venous 7.27 (L) 7.35 - 7.45    pCO2 Venous 56 (H) 35 - 50 mm Hg    pO2 Venous 54 (H) 25 - 47 mm Hg    Bicarbonate Venous 26 24 - 30 mmol/L    Base Excess/Deficit -1.1   mmol/L    Oxyhemoglobin Venous 78.6 (H) 70.0 - 75.0 %    O2 Sat, Venous 89.0 (H) 70.0 - 75.0 %   Asymptomatic COVID-19 Virus (Coronavirus) by PCR Nasopharyngeal    Specimen: Nasopharyngeal; Swab   Result Value Ref Range    SARS CoV2 PCR Negative Negative   Basic metabolic panel   Result Value Ref Range    Sodium 137 136 - 145 mmol/L    Potassium 4.1 3.4 - 5.3 mmol/L    Chloride 105 98 - 107 mmol/L    Carbon Dioxide (CO2) 24 22 - 29 mmol/L    Anion Gap 8 7 - 15 mmol/L    Urea Nitrogen 8.1 8.0 - 23.0 mg/dL    Creatinine 0.56 0.51 - 0.95 mg/dL    Calcium 9.6 8.8 - 10.2 mg/dL    Glucose 135 (H) 70 - 99 mg/dL    GFR Estimate >90 >60 mL/min/1.73m2   CBC with platelets   Result Value Ref Range    WBC Count 13.3 (H) 4.0 - 11.0 10e3/uL    RBC Count 4.79 3.80 - 5.20 10e6/uL    Hemoglobin 14.2 11.7 - 15.7 g/dL    Hematocrit 45.2 35.0 - 47.0 %    MCV 94 78 - 100 fL    MCH 29.6 26.5 - 33.0 pg    MCHC 31.4 (L) 31.5 - 36.5 g/dL    RDW 15.4 (H) 10.0 - 15.0 %    Platelet Count 244 150 - 450 10e3/uL   Result Value Ref Range    Hemoglobin A1C 7.0 (H) <5.7 %   Glucose by meter   Result Value Ref Range    GLUCOSE BY METER POCT 69 (L) 70 - 99 mg/dL   Glucose by meter   Result Value Ref Range    GLUCOSE BY  METER POCT 153 (H) 70 - 99 mg/dL   ECG 12-LEAD WITH MUSE (LHE)   Result Value Ref Range    Systolic Blood Pressure 143 mmHg    Diastolic Blood Pressure 66 mmHg    Ventricular Rate 90 BPM    Atrial Rate 90 BPM    ID Interval 208 ms    QRS Duration 82 ms     ms    QTc 450 ms    P Axis 76 degrees    R AXIS 85 degrees    T Axis 74 degrees    Interpretation ECG       Sinus rhythm  Normal ECG  When compared with ECG of 02-SEP-2023 00:35,  No significant change was found  Confirmed by SEE ED PROVIDER NOTE FOR, ECG INTERPRETATION ([id number]),  JAYA MILLS ([id number]) on 9/12/2023 6:35:09 PM         RADIOLOGY:  Reviewed all pertinent imaging. Please see official radiology report.  Lumbar spine CT w/o contrast   Final Result   IMPRESSION:   1.  No acute thoracolumbar spine fracture.      CT Thoracic Spine w/o Contrast   Final Result   IMPRESSION:   1.  No acute thoracolumbar spine fracture.      XR Chest 1 View   Final Result   IMPRESSION: Stable cardiomediastinal silhouette which is borderline enlarged. Stable mild central pulmonary vascular congestion. No focal pulmonary consolidation or significant pleural effusion. Atherosclerotic calcifications of the aortic arch. No    pneumothorax. No acute osseous abnormality.      Cervical spine CT w/o contrast   Final Result   IMPRESSION:   HEAD CT:   1.  No acute intracranial process.      CERVICAL SPINE CT:   Moderate motion degradation.   1.  Allowing for motion, no convincing fracture. If clinical concern for acute cervical spine fracture persists, consider repeat examination.      Head CT w/o contrast   Final Result   IMPRESSION:   HEAD CT:   1.  No acute intracranial process.      CERVICAL SPINE CT:   Moderate motion degradation.   1.  Allowing for motion, no convincing fracture. If clinical concern for acute cervical spine fracture persists, consider repeat examination.          EKG:         EKG Interpretation:      Interpreted by Grazyna Parra MD  Time reviewed:  9:47 pm  Symptoms at time of EKG: loss of consciousness   Rhythm: normal sinus   Rate: normal  Axis: NORMAL  Ectopy: none  Conduction: normal  ST Segments/ T Waves: No ST-T wave changes  Q Waves: none  Comparison to prior: Unchanged from 9/2/23    Clinical Impression: No evidence of acute ischemia. Sinus rhythm. No sign of WPW, LVH, or Brugada syndrome      I have independently reviewed and interpreted the EKG(s) documented above.    PROCEDURES:   None      [facility] Documentation:   CMS Diagnoses:               IMari, am serving as a scribe to document services personally performed by Grazyna Parra MD based on my observation and the provider's statements to me. I, Grazyna Parra MD, attest that Mari Farrell is acting in a scribe capacity, has observed my performance of the services and has documented them in accordance with my direction.    Grazyna Parra MD  [facility] EMERGENCY DEPARTMENT  [address] [place] [id number]  [phone]       Grazyna Parra MD  10/01/23 1041

## 2023-09-12 NOTE — CONSULTS
Writer went to meet with patient but she denied any discharge planning needs or concerns. She was dressed and getting ready to leave. She had a rolling walker with a seat.     Andressa Price RN

## 2023-09-12 NOTE — ED NOTES
Pt BG 69 upon checking before b'fast.  Pt was resting from overnight.  Easily arousable and interactive with staff.  Linens changed as pt was incontinent.  Pt sitting up in bed.  Awake and alert.  Drank 1 OJ and 1 apple juice and is eating breakfast at this time.  Dr Marrufo notified and requests BG check in 30 min.  No other orders at this time.

## 2023-09-12 NOTE — DISCHARGE SUMMARY
"[facility]  Discharge Summary - Medicine & Pediatrics       Date of Admission:  9/11/2023  Date of Discharge:  9/12/2023 10:33 AM  Discharging Provider: Dr. Beverly Bowles  Discharge Service: Hospitalist Service    Discharge Diagnoses   Episodic altered mental status  Recurrent falls  History of seizure  Mild hyponatremia, resolved  Chronic leukocytosis    Clinically Significant Risk Factors     # DMII: A1C = 7.0 % (Ref range: <5.7 %) within past 6 months  # Overweight: Estimated body mass index is 27.8 kg/m  as calculated from the following:    Height as of this encounter: 1.575 m (5' 2\").    Weight as of this encounter: 68.9 kg (152 lb).       Follow-ups Needed After Discharge   Follow-up Appointments     Follow-up and recommended labs and tests       Follow up with primary care provider, SHA FRASER, within 7   days for hospital follow- up.  No follow up labs or test are needed.    Follow up with your neurologist, on 9/26 as you have scheduled.          Unresulted Labs Ordered in the Past 30 Days of this Admission       Date and Time Order Name Status Description    9/11/2023 11:43 PM Topiramate Level In process         These results will be followed up by PCP    Discharge Disposition   Discharged to home  Condition at discharge: Stable    Hospital Course   Rukhsana Griffith is a 62 year old female admitted on 9/11/2023. She has a history of DM, gastroparesis, schizoaffective disorder, seizure and is admitted for episodic altered mental status with a history of seizures. Per the patient, she has been having seizures for the past four days. Per EMS report, when they responded to patient, her body was moving and patient stated that she was having a seizure. Her work-up was fairly unremarkable. She was seen by neurology here for which they did not feel this was an epileptic spell and recommended she follow up with her primary neurology team as patient was wanting to go home " and felt at her baseline. The patient has an appointment with her primary neurologist on 9/26. Also encouraged to follow up with her PCP as it is unclear how the patient is taking her diabetes medication and wondering if her blood glucose levels affected her presenting concern. Lastly, noted to desaturate at night and recommend considering sleep study as an outpatient.     Consultations This Hospital Stay   NEUROLOGY IP CONSULT  CARE MANAGEMENT / SOCIAL WORK IP CONSULT  PHYSICAL THERAPY ADULT IP CONSULT  OCCUPATIONAL THERAPY ADULT IP CONSULT    Code Status   Full Code       The patient was discussed with Dr. Beverly Cee MD  [place] [facility] EMERGENCY DEPARTMENT  [address] [facility] [address]  Phone: [phone]  ______________________________________________________________________    Physical Exam   Vital Signs: Temp: 98.2  F (36.8  C) Temp src: Oral BP: (!) 130/101 Pulse: 96   Resp: 18 SpO2: 91 % O2 Device: None (Room air)    Weight: 152 lbs 0 oz  Gen: Pleasant. No distress.    HEENT: MMM.   Neck: No overt asymmetry.   CV: Appears well-perfused. RRR.   Resp: Breathing comfortably on room air. Lungs clear to auscultation bilaterally without wheeze or crackle.   Abd: Non-distended, non-tender.   Ext: No edema or overt asymmetry/deformity.   Skin: No overt rash on easily visualized skin.   Neuro: Non-focal.   Psych: Calm.         Primary Care Physician   SHA FRASER    Discharge Orders      Reason for your hospital stay    Concerns for seizures.     Follow-up and recommended labs and tests     Follow up with primary care provider, SHA FRASER, within 7 days for hospital follow- up.  No follow up labs or test are needed.    Follow up with your neurologist, on 9/26 as you have scheduled.     Activity    Your activity upon discharge: activity as tolerated     Diet    Follow this diet upon discharge: Orders Placed This  Encounter      Combination Diet Regular Diet Adult       Significant Results and Procedures   Results for orders placed or performed during the hospital encounter of 09/11/23   Head CT w/o contrast    Narrative    EXAM: CT HEAD W/O CONTRAST, CT CERVICAL SPINE W/O CONTRAST  LOCATION: [facility]  DATE: 9/11/2023    INDICATION: Trauma, fall, altered mental status  COMPARISON: CT head 04/11/2023, CT cervical spine 06/10/2014  TECHNIQUE:   1) Routine CT Head without IV contrast. Multiplanar reformats. Dose reduction techniques were used.  2) Routine CT Cervical Spine without IV contrast. Multiplanar reformats. Dose reduction techniques were used.    FINDINGS:   HEAD CT:   INTRACRANIAL CONTENTS: No intracranial hemorrhage, extraaxial collection, or mass effect.  No CT evidence of acute infarct. Normal parenchymal attenuation. Normal ventricles and sulci.     VISUALIZED ORBITS/SINUSES/MASTOIDS: No intraorbital abnormality.   Complete opacification of the left sphenoid sinus. No middle ear or mastoid effusion.    BONES/SOFT TISSUES: No acute abnormality.    CERVICAL SPINE CT:   Moderate motion degradation.    Alignment is normal. Vertebral body heights are normal. No convincing fracture, allowing for motion.    No significant canal or foraminal stenosis.    No prevertebral edema.      Impression    IMPRESSION:  HEAD CT:  1.  No acute intracranial process.    CERVICAL SPINE CT:  Moderate motion degradation.  1.  Allowing for motion, no convincing fracture. If clinical concern for acute cervical spine fracture persists, consider repeat examination.   Cervical spine CT w/o contrast    Narrative    EXAM: CT HEAD W/O CONTRAST, CT CERVICAL SPINE W/O CONTRAST  LOCATION: [facility]  DATE: 9/11/2023    INDICATION: Trauma, fall, altered mental status  COMPARISON: CT head 04/11/2023, CT cervical spine 06/10/2014  TECHNIQUE:   1) Routine CT Head without IV contrast. Multiplanar  reformats. Dose reduction techniques were used.  2) Routine CT Cervical Spine without IV contrast. Multiplanar reformats. Dose reduction techniques were used.    FINDINGS:   HEAD CT:   INTRACRANIAL CONTENTS: No intracranial hemorrhage, extraaxial collection, or mass effect.  No CT evidence of acute infarct. Normal parenchymal attenuation. Normal ventricles and sulci.     VISUALIZED ORBITS/SINUSES/MASTOIDS: No intraorbital abnormality.   Complete opacification of the left sphenoid sinus. No middle ear or mastoid effusion.    BONES/SOFT TISSUES: No acute abnormality.    CERVICAL SPINE CT:   Moderate motion degradation.    Alignment is normal. Vertebral body heights are normal. No convincing fracture, allowing for motion.    No significant canal or foraminal stenosis.    No prevertebral edema.      Impression    IMPRESSION:  HEAD CT:  1.  No acute intracranial process.    CERVICAL SPINE CT:  Moderate motion degradation.  1.  Allowing for motion, no convincing fracture. If clinical concern for acute cervical spine fracture persists, consider repeat examination.   CT Thoracic Spine w/o Contrast    Narrative    EXAM: CT THORACIC SPINE W/O CONTRAST, CT LUMBAR SPINE W/O CONTRAST  LOCATION: [facility]  DATE/TIME: 9/11/2023 11:13 PM CDT    INDICATION:  Trauma, fall, pain  COMPARISON: CT chest 09/14/2022, CT abdomen 08/23/2023  TECHNIQUE:   1) Routine CT Thoracic Spine without IV contrast. Multiplanar reformats. Dose reduction techniques were used.   2) Routine CT Lumbar Spine without IV contrast. Multiplanar reformats. Dose reduction techniques were used.     FINDINGS:   THORACIC SPINE CT:  VERTEBRA: Vertebral body heights and alignment are unchanged with mild chronic superior endplate deformities at T4, T5, and T7 due to Schmorl's nodes. No acute compression fracture or posttraumatic subluxation.     CANAL/FORAMINA: No significant canal or neural foraminal stenosis.    PARASPINAL: No acute  extraspinal abnormality.     LUMBAR SPINE CT:  VERTEBRA: Normal vertebral body heights and alignment. No acute compression fracture or posttraumatic subluxation.     CANAL/FORAMINA:  Mild spondylosis without significant canal or foraminal stenosis.    PARASPINAL: No acute extraspinal abnormality. Bilateral sacral nerve stimulators.      Impression    IMPRESSION:  1.  No acute thoracolumbar spine fracture.   Lumbar spine CT w/o contrast    Narrative    EXAM: CT THORACIC SPINE W/O CONTRAST, CT LUMBAR SPINE W/O CONTRAST  LOCATION: [facility]  DATE/TIME: 9/11/2023 11:13 PM CDT    INDICATION:  Trauma, fall, pain  COMPARISON: CT chest 09/14/2022, CT abdomen 08/23/2023  TECHNIQUE:   1) Routine CT Thoracic Spine without IV contrast. Multiplanar reformats. Dose reduction techniques were used.   2) Routine CT Lumbar Spine without IV contrast. Multiplanar reformats. Dose reduction techniques were used.     FINDINGS:   THORACIC SPINE CT:  VERTEBRA: Vertebral body heights and alignment are unchanged with mild chronic superior endplate deformities at T4, T5, and T7 due to Schmorl's nodes. No acute compression fracture or posttraumatic subluxation.     CANAL/FORAMINA: No significant canal or neural foraminal stenosis.    PARASPINAL: No acute extraspinal abnormality.     LUMBAR SPINE CT:  VERTEBRA: Normal vertebral body heights and alignment. No acute compression fracture or posttraumatic subluxation.     CANAL/FORAMINA:  Mild spondylosis without significant canal or foraminal stenosis.    PARASPINAL: No acute extraspinal abnormality. Bilateral sacral nerve stimulators.      Impression    IMPRESSION:  1.  No acute thoracolumbar spine fracture.   XR Chest 1 View    Narrative    EXAM: XR CHEST 1 VIEW  LOCATION: [facility]  DATE: 9/11/2023    INDICATION: loss of consciousness  COMPARISON: 2 view chest radiograph 09/02/2023      Impression    IMPRESSION: Stable cardiomediastinal  silhouette which is borderline enlarged. Stable mild central pulmonary vascular congestion. No focal pulmonary consolidation or significant pleural effusion. Atherosclerotic calcifications of the aortic arch. No   pneumothorax. No acute osseous abnormality.       Discharge Medications   Discharge Medication List as of 9/12/2023 10:08 AM        CONTINUE these medications which have NOT CHANGED    Details   acetaminophen (TYLENOL) 160 mg/5 mL (5 mL) Soln solution [ACETAMINOPHEN (TYLENOL) 160 MG/5 ML (5 ML) SOLN SOLUTION] Take 10 mL by mouth every 4 (four) hours as needed (Pain).       , Historical      albuterol (PROAIR HFA;PROVENTIL HFA;VENTOLIN HFA) 90 mcg/actuation inhaler [ALBUTEROL (PROAIR HFA;PROVENTIL HFA;VENTOLIN HFA) 90 MCG/ACTUATION INHALER] Inhale 2 puffs every 6 (six) hours as needed for wheezing., Historical      albuterol (PROVENTIL) 2.5 mg /3 mL (0.083 %) nebulizer solution [ALBUTEROL (PROVENTIL) 2.5 MG /3 ML (0.083 %) NEBULIZER SOLUTION] Take 2.5 mg by nebulization every 4 (four) hours as needed for wheezing.       , Historical      aluminum-magnesium hydroxide-simethicone (MAALOX MAX STRENGTH) 400-400-40 mg/5 mL suspension [ALUMINUM-MAGNESIUM HYDROXIDE-SIMETHICONE (MAALOX MAX STRENGTH) 400-400-40 MG/5 ML SUSPENSION] Take 5 mL by mouth every 4 (four) hours as needed for indigestion.       , Historical      ammonium lactate (AMLACTIN) 12 % cream [AMMONIUM LACTATE (AMLACTIN) 12 % CREAM] Apply 1 application topically 2 (two) times a day.       Historical      atropine 1 % ophthalmic solution [ATROPINE 1 % OPHTHALMIC SOLUTION] Place 1-2 drops under the tongue 2 (two) times a day. Takes under tongue to stop drooling      , Historical      budesonide-formoterol (SYMBICORT) 160-4.5 mcg/actuation inhaler [BUDESONIDE-FORMOTEROL (SYMBICORT) 160-4.5 MCG/ACTUATION INHALER] Inhale 2 puffs 2 (two) times a day.       , Historical      cholecalciferol, vitamin D3, 2,000 unit Tab [CHOLECALCIFEROL, VITAMIN D3, 2,000  UNIT TAB] Take 2,000 Units by mouth daily., Historical      cloZAPine (CLOZARIL) 100 MG tablet [CLOZAPINE (CLOZARIL) 100 MG TABLET] Take 200 mg by mouth at bedtime.       , Historical      diphenhydrAMINE (BENADRYL) 25 mg capsule [DIPHENHYDRAMINE (BENADRYL) 25 MG CAPSULE] Take 25 mg by mouth at bedtime as needed for itching., Historical      docusate sodium (COLACE) 100 MG capsule [DOCUSATE SODIUM (COLACE) 100 MG CAPSULE] Take 100 mg by mouth 2 (two) times a day.       , Historical      EPINEPHrine (EPIPEN) 0.3 mg/0.3 mL atIn [EPINEPHRINE (EPIPEN) 0.3 MG/0.3 ML ATIN] Inject 0.3 mg into the shoulder, thigh, or buttocks as needed., Historical      escitalopram oxalate (LEXAPRO) 10 MG tablet [ESCITALOPRAM OXALATE (LEXAPRO) 10 MG TABLET] Take 10 mg by mouth daily.       , Historical      famotidine (PEPCID) 40 MG tablet [FAMOTIDINE (PEPCID) 40 MG TABLET] Take 40 mg by mouth 2 (two) times a day., Historical      fluoride, sodium, (DENTAGEL) 1.1 % Gel dental gel [FLUORIDE, SODIUM, (DENTAGEL) 1.1 % GEL DENTAL GEL] Apply 1 application to teeth at bedtime.Historical      glucagon, human recombinant, (GLUCAGEN HYPOKIT) 1 mg injection [GLUCAGON, HUMAN RECOMBINANT, (GLUCAGEN HYPOKIT) 1 MG INJECTION] Infuse 1 mg into a venous catheter once as needed (Low blood sugars).       , Historical      insulin aspart U-100 (NOVOLOG) 100 unit/mL injection [INSULIN ASPART U-100 (NOVOLOG) 100 UNIT/ML INJECTION] Inject 15 Units under the skin 3 (three) times a day before meals. Sliding scale       , Historical      insulin glargine (LANTUS) 100 unit/mL injection [INSULIN GLARGINE (LANTUS) 100 UNIT/ML INJECTION] Inject 25 Units under the skin every morning.       , Historical      ipratropium-albuterol (DUO-NEB) 0.5-2.5 mg/3 mL nebulizer [IPRATROPIUM-ALBUTEROL (DUO-NEB) 0.5-2.5 MG/3 ML NEBULIZER] Take 3 mL by nebulization 4 (four) times a day as needed.       , Historical      lisinopril (PRINIVIL,ZESTRIL) 10 MG tablet [LISINOPRIL  (PRINIVIL,ZESTRIL) 10 MG TABLET] Take 10 mg by mouth daily., Historical      loratadine (CLARITIN) 10 mg tablet [LORATADINE (CLARITIN) 10 MG TABLET] Take 10 mg by mouth daily., Historical      lubiprostone (AMITIZA) 24 MCG capsule [LUBIPROSTONE (AMITIZA) 24 MCG CAPSULE] Take 24 mcg by mouth 2 (two) times a day., Historical      magnesium hydroxide (MILK OF MAG) 400 mg/5 mL Susp suspension [MAGNESIUM HYDROXIDE (MILK OF MAG) 400 MG/5 ML SUSP SUSPENSION] Take 30 mL by mouth 2 (two) times a day as needed.       , Historical      mometasone (NASONEX) 50 mcg/actuation nasal spray [MOMETASONE (NASONEX) 50 MCG/ACTUATION NASAL SPRAY] 2 sprays into each nostril daily., Historical      multivitamin with minerals (THERA-M) 9 mg iron-400 mcg Tab tablet [MULTIVITAMIN WITH MINERALS (THERA-M) 9 MG IRON-400 MCG TAB TABLET] Take 1 tablet by mouth daily., Historical      nicotine (NICODERM CQ) 21 mg/24 hr [NICOTINE (NICODERM CQ) 21 MG/24 HR] Place 1 patch on the skin daily as needed for smoking cessation.       , Historical      oxyCODONE-acetaminophen (PERCOCET/ENDOCET) 5-325 mg per tablet [OXYCODONE-ACETAMINOPHEN (PERCOCET/ENDOCET) 5-325 MG PER TABLET] Take 1 tablet by mouth every 6 (six) hours as needed for pain., Disp-12 tablet, R-0, Print      polyethylene glycol (MIRALAX) 17 gram packet [POLYETHYLENE GLYCOL (MIRALAX) 17 GRAM PACKET] Take 17 g by mouth 2 (two) times a day.       , Historical      pravastatin (PRAVACHOL) 40 MG tablet [PRAVASTATIN (PRAVACHOL) 40 MG TABLET] Take 40 mg by mouth every morning.       , Historical      ramelteon (ROZEREM) 8 mg tablet [RAMELTEON (ROZEREM) 8 MG TABLET] Take 8 mg by mouth at bedtime., Historical      SUMAtriptan (IMITREX) 100 MG tablet [SUMATRIPTAN (IMITREX) 100 MG TABLET] Take 100 mg by mouth every 2 (two) hours as needed for migraine., Historical      topiramate (TOPAMAX) 200 MG tablet [TOPIRAMATE (TOPAMAX) 200 MG TABLET] Take 200 mg by mouth 2 (two) times a day., Historical     "  triamcinolone (KENALOG) 0.1 % ointment [TRIAMCINOLONE (KENALOG) 0.1 % OINTMENT] Apply 1 application topically 2 (two) times a day.Historical      trimethobenzamide (TIGAN) 300 mg capsule [TRIMETHOBENZAMIDE (TIGAN) 300 MG CAPSULE] Take 300 mg by mouth 3 (three) times a day as needed (N/V)., Historical           Allergies   Allergies   Allergen Reactions    Aspirin Unknown     Other reaction(s): Bleeding    Bee Sting Kit [Bee Venom] Anaphylaxis    Botox [Onabotulinumtoxina] Hives and Shortness Of Breath    Garlic Hives    Hydrocodone Headache, Hives and Itching    Hydromorphone Other (See Comments) and Rash     Seizures    Hydroxyzine Hives, Other (See Comments), Anaphylaxis and Shortness Of Breath     Dyspnea    Hymenoptera Allergenic Extract [Wasp Venom Protein] Anaphylaxis    Iodinated Contrast Media Hives, Other (See Comments), Itching and Swelling     EDEMA    Latex Shortness Of Breath    Lidocaine Hives     lidoderm patch only; tolerates injectable lidocaine    Onion Hives     green, red, yellow, mushrooms, garlic---all cause pt to have hives    Oxycodone Hives and Rash    Procaine Hives     tolerates injectable bupivacaine and lidocaine      Sucralose Anaphylaxis    Diazepam Rash     also: parasomnia, as pt reported waking up in bathtub    Haloperidol Rash    Ketorolac Rash    Lithium Rash    Meperidine Rash    Diatrizoate Meglumine [Diatrizoate] Hives and Swelling    Green Pepper [Capsicum] Hives    Metformin Other (See Comments)     \"Stomach bleeding and kidney infection\"    Adhesive Tape-Silicones [Adhesive Tape] Rash    Draper [Nuts] Rash    Cephalosporins Rash    Chlorpromazine Rash     THORAZINE    Chlorpropamide Rash    Coconut (Cocos Nucifera) Rash    Codeine Headache     Migraines       Darvocet A500 [Propoxyphene N-Apap] Rash    Dipyridamole Rash    Fentanyl Rash    Fluoxetine Rash    Furosemide Rash    Glyburide Other (See Comments) and Rash     Other reaction(s): hypoglycemia (patient is very " sensitive to sulfonylureas)    Ondansetron Rash    Penicillins Rash and Other (See Comments)     Migraines, dizzy and sweating    Shellfish Containing Products [Shellfish-Derived Products] Rash    Sulfa (Sulfonamide Antibiotics) [Sulfa Antibiotics] Rash    Thallium-201 [Thallous Chloride Tl 201] Rash    Thioridazine Rash    Tizanidine Rash

## 2023-09-12 NOTE — H&P
[facility]    History and Physical - Hospitalist Service       Date of Admission:  9/11/2023    Assessment & Plan      Rukhsana Griffith is a 62 year old female admitted on 9/11/2023. She has a history of DM, gastroparesis, schizoaffective disorder, seizure and is admitted for altered mental status/falls    Episodic altered mental status  Recurrent falls  History of seizure  History of nerve stimulator device  Patient has history of seizure disorder, reports being on Depakote, however, this is not on her home medication list. Topiramate is listed. Last neurology note 9/18/2020 during a hospitalization.  Patient reports she has been having breakthrough seizures recently, and sometimes waking up on the floor.  Appears she has had multiple falls.  She is quite somnolent on exam.  VBG without significant hypercapnia.  CT head and entire spine without evidence of acute process or injury.  She does have a history of labile blood sugars, so if neuro work-up is reassuring could revisit diabetic medications.  -Neurology consulted, appreciate recs  -BMP, CBC in a.m.  -SW/PT/OT for discharge planning    Asthma/COPD  ?  Sleep apnea  Desatting to the low 80s when sleeping.  Oxygenation improves with 3 L NC.  Snoring with sleeping.  -Continuous pulse ox  -As needed albuterol inhaler  -When med rec complete consider continuing daily Symbicort  -Likely will need a sleep study    Diabetes   Home regimen appears to be 25 units Lantus in a.m., 15 units NovoLog with meals.  Blood sugar on arrival 139.  Most recent A1c 6/9/2023 6.9.  Repeat A1c pending.  -20 units Lantus before breakfast  -Medium sliding scale    Gastroparesis  History of gastric pacemaker  History of diabetic gastroparesis.  Follows with GI.  Not reporting any significant abdominal pain on admission  -When med rec has been completed consider restarting home meds for:  -Nausea   -Tigan  -Constipation   -MiraLAX, milk of magnesia,  "lubiprostone, Colace  -Indigestion  -Famotidine, Maalox    Hyponatremia  Sodium 131 at admission.  This appears to be near her baseline  -Daily BMP    Elevated alkaline phosphatase  128 admission.  This also appears to be her baseline.  No further work-up    Leukocytosis  White count 16.2 on admission.  This appears to be chronically elevated.  She also recently had steroids for COPD flare.  -CBC in a.m.    Med rec has not been completed on this patient, day team will need to evaluate home meds to reorder.  Schizoaffective, anxiety, insomnia-hold PTA ramelteon in setting of somnolence.  After med rec is complete consider restarting Lexapro, clozapine  Migraine-hold PTA sumatriptan  Hypertension-consider restarting lisinopril after med rec  HLP-consider restarting pravastatin          Diet: Combination Diet Regular Diet Adult  DVT Prophylaxis: Enoxaparin (Lovenox) SQ  Chatterjee Catheter: Not present  Fluids: PO  Lines: None     Cardiac Monitoring: None  Code Status: Full Code    Clinically Significant Risk Factors Present on Admission           # Hypercalcemia: Highest Ca = 10.4 mg/dL in last 2 days, will monitor as appropriate          # Hypertension: Noted on problem list   # Acute Respiratory Failure: based on blood gas results.  Continue supplemental oxygen as needed    # DMII: A1C = N/A within past 6 months   # Overweight: Estimated body mass index is 27.8 kg/m  as calculated from the following:    Height as of this encounter: 1.575 m (5' 2\").    Weight as of this encounter: 68.9 kg (152 lb).       # Asthma: noted on problem list        Disposition Plan      Expected Discharge Date: 09/14/2023                The patient's care was discussed with the  attending physician at morning report .      Yunior Marrufo MD  Hospitalist Service  [facility]  Securely message with [employer] (more info)  Text page via [employer] Paging/Directory "   ______________________________________________________________________    Chief Complaint   Seizures    History is obtained from the patient and chart review    History of Present Illness   Rukhsana Griffith is a 62 year old female who has a history of seizure disorder, COPD, DM, schizoaffective disorder and is admitted for concern for seizure.     Patiently reportedly called EMS for seizures.  When they arrived she was flailing arms and telling them she was having a seizure.  reported to ED doctor increase seizure activity for the past 4 days and also several instances of waking up on the floor and not knowing how she got there.    Patient became quite somnolent during her time in the emergency department.  Quite difficult to arouse.  On my interview she only awakens to report feeling tired.  Denies any other symptoms, pain or concerns.      Past Medical History    Past Medical History:   Diagnosis Date    COPD (chronic obstructive pulmonary disease) (H)     Diabetes (H)     HLD (hyperlipidemia)     Methamphetamine abuse (H)     Schizoaffective disorder (H)     Seizures (H)        Past Surgical History   Past Surgical History:   Procedure Laterality Date    HYSTERECTOMY      KNEE SURGERY      OTHER SURGICAL HISTORY      gastric pacemaker    OTHER SURGICAL HISTORY      interstim placement    UT REVISE/REMOVE PERIPH/GASTRIC NEUROSTIM/ N/A 5/15/2019    Procedure: PARTIAL REMOVAL OLD LEAD;  Surgeon: Jcarlos Muñiz MD;  Location: [facility];  Service: Urology    RELEASE CARPAL TUNNEL         Prior to Admission Medications   Prior to Admission Medications   Prescriptions Last Dose Informant Patient Reported? Taking?   EPINEPHrine (EPIPEN) 0.3 mg/0.3 mL atIn   Yes No   Sig: [EPINEPHRINE (EPIPEN) 0.3 MG/0.3 ML ATIN] Inject 0.3 mg into the shoulder, thigh, or buttocks as needed.   SUMAtriptan (IMITREX) 100 MG tablet   Yes No   Sig: [SUMATRIPTAN (IMITREX) 100 MG TABLET] Take 100 mg by mouth every 2 (two)  hours as needed for migraine.   acetaminophen (TYLENOL) 160 mg/5 mL (5 mL) Soln solution   Yes No   Sig: [ACETAMINOPHEN (TYLENOL) 160 MG/5 ML (5 ML) SOLN SOLUTION] Take 10 mL by mouth every 4 (four) hours as needed (Pain).          albuterol (PROAIR HFA;PROVENTIL HFA;VENTOLIN HFA) 90 mcg/actuation inhaler   Yes No   Sig: [ALBUTEROL (PROAIR HFA;PROVENTIL HFA;VENTOLIN HFA) 90 MCG/ACTUATION INHALER] Inhale 2 puffs every 6 (six) hours as needed for wheezing.   albuterol (PROVENTIL) 2.5 mg /3 mL (0.083 %) nebulizer solution   Yes No   Sig: [ALBUTEROL (PROVENTIL) 2.5 MG /3 ML (0.083 %) NEBULIZER SOLUTION] Take 2.5 mg by nebulization every 4 (four) hours as needed for wheezing.          aluminum-magnesium hydroxide-simethicone (MAALOX MAX STRENGTH) 400-400-40 mg/5 mL suspension   Yes No   Sig: [ALUMINUM-MAGNESIUM HYDROXIDE-SIMETHICONE (MAALOX MAX STRENGTH) 400-400-40 MG/5 ML SUSPENSION] Take 5 mL by mouth every 4 (four) hours as needed for indigestion.          ammonium lactate (AMLACTIN) 12 % cream   Yes No   Sig: [AMMONIUM LACTATE (AMLACTIN) 12 % CREAM] Apply 1 application topically 2 (two) times a day.          atropine 1 % ophthalmic solution   Yes No   Sig: [ATROPINE 1 % OPHTHALMIC SOLUTION] Place 1-2 drops under the tongue 2 (two) times a day. Takes under tongue to stop drooling         budesonide-formoterol (SYMBICORT) 160-4.5 mcg/actuation inhaler   Yes No   Sig: [BUDESONIDE-FORMOTEROL (SYMBICORT) 160-4.5 MCG/ACTUATION INHALER] Inhale 2 puffs 2 (two) times a day.          cholecalciferol, vitamin D3, 2,000 unit Tab   Yes No   Sig: [CHOLECALCIFEROL, VITAMIN D3, 2,000 UNIT TAB] Take 2,000 Units by mouth daily.   cloZAPine (CLOZARIL) 100 MG tablet   Yes No   Sig: [CLOZAPINE (CLOZARIL) 100 MG TABLET] Take 200 mg by mouth at bedtime.          diphenhydrAMINE (BENADRYL) 25 mg capsule   Yes No   Sig: [DIPHENHYDRAMINE (BENADRYL) 25 MG CAPSULE] Take 25 mg by mouth at bedtime as needed for itching.   docusate sodium (COLACE)  100 MG capsule   Yes No   Sig: [DOCUSATE SODIUM (COLACE) 100 MG CAPSULE] Take 100 mg by mouth 2 (two) times a day.          escitalopram oxalate (LEXAPRO) 10 MG tablet   Yes No   Sig: [ESCITALOPRAM OXALATE (LEXAPRO) 10 MG TABLET] Take 10 mg by mouth daily.          famotidine (PEPCID) 40 MG tablet   Yes No   Sig: [FAMOTIDINE (PEPCID) 40 MG TABLET] Take 40 mg by mouth 2 (two) times a day.   fluoride, sodium, (DENTAGEL) 1.1 % Gel dental gel   Yes No   Sig: [FLUORIDE, SODIUM, (DENTAGEL) 1.1 % GEL DENTAL GEL] Apply 1 application to teeth at bedtime.   glucagon, human recombinant, (GLUCAGEN HYPOKIT) 1 mg injection   Yes No   Sig: [GLUCAGON, HUMAN RECOMBINANT, (GLUCAGEN HYPOKIT) 1 MG INJECTION] Infuse 1 mg into a venous catheter once as needed (Low blood sugars).          insulin aspart U-100 (NOVOLOG) 100 unit/mL injection   Yes No   Sig: [INSULIN ASPART U-100 (NOVOLOG) 100 UNIT/ML INJECTION] Inject 15 Units under the skin 3 (three) times a day before meals. Sliding scale          insulin glargine (LANTUS) 100 unit/mL injection   Yes No   Sig: [INSULIN GLARGINE (LANTUS) 100 UNIT/ML INJECTION] Inject 25 Units under the skin every morning.          ipratropium-albuterol (DUO-NEB) 0.5-2.5 mg/3 mL nebulizer   Yes No   Sig: [IPRATROPIUM-ALBUTEROL (DUO-NEB) 0.5-2.5 MG/3 ML NEBULIZER] Take 3 mL by nebulization 4 (four) times a day as needed.          lisinopril (PRINIVIL,ZESTRIL) 10 MG tablet   Yes No   Sig: [LISINOPRIL (PRINIVIL,ZESTRIL) 10 MG TABLET] Take 10 mg by mouth daily.   loratadine (CLARITIN) 10 mg tablet   Yes No   Sig: [LORATADINE (CLARITIN) 10 MG TABLET] Take 10 mg by mouth daily.   lubiprostone (AMITIZA) 24 MCG capsule   Yes No   Sig: [LUBIPROSTONE (AMITIZA) 24 MCG CAPSULE] Take 24 mcg by mouth 2 (two) times a day.   magnesium hydroxide (MILK OF MAG) 400 mg/5 mL Susp suspension   Yes No   Sig: [MAGNESIUM HYDROXIDE (MILK OF MAG) 400 MG/5 ML SUSP SUSPENSION] Take 30 mL by mouth 2 (two) times a day as needed.           mometasone (NASONEX) 50 mcg/actuation nasal spray   Yes No   Sig: [MOMETASONE (NASONEX) 50 MCG/ACTUATION NASAL SPRAY] 2 sprays into each nostril daily.   multivitamin with minerals (THERA-M) 9 mg iron-400 mcg Tab tablet   Yes No   Sig: [MULTIVITAMIN WITH MINERALS (THERA-M) 9 MG IRON-400 MCG TAB TABLET] Take 1 tablet by mouth daily.   nicotine (NICODERM CQ) 21 mg/24 hr   Yes No   Sig: [NICOTINE (NICODERM CQ) 21 MG/24 HR] Place 1 patch on the skin daily as needed for smoking cessation.          oxyCODONE-acetaminophen (PERCOCET/ENDOCET) 5-325 mg per tablet   No No   Sig: [OXYCODONE-ACETAMINOPHEN (PERCOCET/ENDOCET) 5-325 MG PER TABLET] Take 1 tablet by mouth every 6 (six) hours as needed for pain.   polyethylene glycol (MIRALAX) 17 gram packet   Yes No   Sig: [POLYETHYLENE GLYCOL (MIRALAX) 17 GRAM PACKET] Take 17 g by mouth 2 (two) times a day.          pravastatin (PRAVACHOL) 40 MG tablet   Yes No   Sig: [PRAVASTATIN (PRAVACHOL) 40 MG TABLET] Take 40 mg by mouth every morning.          ramelteon (ROZEREM) 8 mg tablet   Yes No   Sig: [RAMELTEON (ROZEREM) 8 MG TABLET] Take 8 mg by mouth at bedtime.   topiramate (TOPAMAX) 200 MG tablet   Yes No   Sig: [TOPIRAMATE (TOPAMAX) 200 MG TABLET] Take 200 mg by mouth 2 (two) times a day.   triamcinolone (KENALOG) 0.1 % ointment   Yes No   Sig: [TRIAMCINOLONE (KENALOG) 0.1 % OINTMENT] Apply 1 application topically 2 (two) times a day.   trimethobenzamide (TIGAN) 300 mg capsule   Yes No   Sig: [TRIMETHOBENZAMIDE (TIGAN) 300 MG CAPSULE] Take 300 mg by mouth 3 (three) times a day as needed (N/V).      Facility-Administered Medications: None      ------------------------------------------------------------------------     Physical Exam   Vital Signs: Temp: 98.2  F (36.8  C) Temp src: Oral BP: (!) 148/70 Pulse: 90   Resp: 18 SpO2: 90 % O2 Device: None (Room air)    Weight: 152 lbs 0 oz    EXAM  General: Sleeping, difficult to arouse no acute distress  Head: Nontraumatic   Eyes:  EOM  intact   Oropharynx: moist oral mucus membranes  Pulm: CTA BL, no tachypnea, speaking in full sentences, nasal cannula in place  CV: RRR, no murmur  Abd: soft, NTND, no masses  Ext: Warm, well perfused,  no LE edema  Skin: No rash on limited skin exam  Neuro: Difficult to arouse, but when awake speaks in full sentences.   Psych: Mood appropriate to visit content        Data   ------------------------- PAST 24 HR DATA REVIEWED -----------------------------------------------    I have personally reviewed the following data over the past 24 hrs:    16.2 (H)  \   14.2   / 263     131 (L) 99 9.0 /  139 (H)   4.0 23 0.55 \     ALT: 18 AST: 26 AP: 128 (H) TBILI: <0.2   ALB: 3.8 TOT PROTEIN: 7.1 LIPASE: N/A     Trop: 13 BNP: N/A     TSH: 1.76 T4: N/A A1C: N/A     Procal: N/A CRP: N/A Lactic Acid: 1.0       INR:  0.90 PTT:  N/A   D-dimer:  N/A Fibrinogen:  N/A       Imaging results reviewed over the past 24 hrs:   Recent Results (from the past 24 hour(s))   Head CT w/o contrast    Narrative    EXAM: CT HEAD W/O CONTRAST, CT CERVICAL SPINE W/O CONTRAST  LOCATION: [facility]  DATE: 9/11/2023    INDICATION: Trauma, fall, altered mental status  COMPARISON: CT head 04/11/2023, CT cervical spine 06/10/2014  TECHNIQUE:   1) Routine CT Head without IV contrast. Multiplanar reformats. Dose reduction techniques were used.  2) Routine CT Cervical Spine without IV contrast. Multiplanar reformats. Dose reduction techniques were used.    FINDINGS:   HEAD CT:   INTRACRANIAL CONTENTS: No intracranial hemorrhage, extraaxial collection, or mass effect.  No CT evidence of acute infarct. Normal parenchymal attenuation. Normal ventricles and sulci.     VISUALIZED ORBITS/SINUSES/MASTOIDS: No intraorbital abnormality.   Complete opacification of the left sphenoid sinus. No middle ear or mastoid effusion.    BONES/SOFT TISSUES: No acute abnormality.    CERVICAL SPINE CT:   Moderate motion degradation.    Alignment is  normal. Vertebral body heights are normal. No convincing fracture, allowing for motion.    No significant canal or foraminal stenosis.    No prevertebral edema.      Impression    IMPRESSION:  HEAD CT:  1.  No acute intracranial process.    CERVICAL SPINE CT:  Moderate motion degradation.  1.  Allowing for motion, no convincing fracture. If clinical concern for acute cervical spine fracture persists, consider repeat examination.   Cervical spine CT w/o contrast    Narrative    EXAM: CT HEAD W/O CONTRAST, CT CERVICAL SPINE W/O CONTRAST  LOCATION: [facility]  DATE: 9/11/2023    INDICATION: Trauma, fall, altered mental status  COMPARISON: CT head 04/11/2023, CT cervical spine 06/10/2014  TECHNIQUE:   1) Routine CT Head without IV contrast. Multiplanar reformats. Dose reduction techniques were used.  2) Routine CT Cervical Spine without IV contrast. Multiplanar reformats. Dose reduction techniques were used.    FINDINGS:   HEAD CT:   INTRACRANIAL CONTENTS: No intracranial hemorrhage, extraaxial collection, or mass effect.  No CT evidence of acute infarct. Normal parenchymal attenuation. Normal ventricles and sulci.     VISUALIZED ORBITS/SINUSES/MASTOIDS: No intraorbital abnormality.   Complete opacification of the left sphenoid sinus. No middle ear or mastoid effusion.    BONES/SOFT TISSUES: No acute abnormality.    CERVICAL SPINE CT:   Moderate motion degradation.    Alignment is normal. Vertebral body heights are normal. No convincing fracture, allowing for motion.    No significant canal or foraminal stenosis.    No prevertebral edema.      Impression    IMPRESSION:  HEAD CT:  1.  No acute intracranial process.    CERVICAL SPINE CT:  Moderate motion degradation.  1.  Allowing for motion, no convincing fracture. If clinical concern for acute cervical spine fracture persists, consider repeat examination.   XR Chest 1 View    Narrative    EXAM: XR CHEST 1 VIEW  LOCATION: [facility]  DATE: 9/11/2023    INDICATION: loss of consciousness  COMPARISON: 2 view chest radiograph 09/02/2023      Impression    IMPRESSION: Stable cardiomediastinal silhouette which is borderline enlarged. Stable mild central pulmonary vascular congestion. No focal pulmonary consolidation or significant pleural effusion. Atherosclerotic calcifications of the aortic arch. No   pneumothorax. No acute osseous abnormality.   CT Thoracic Spine w/o Contrast    Narrative    EXAM: CT THORACIC SPINE W/O CONTRAST, CT LUMBAR SPINE W/O CONTRAST  LOCATION: [facility]  DATE/TIME: 9/11/2023 11:13 PM CDT    INDICATION:  Trauma, fall, pain  COMPARISON: CT chest 09/14/2022, CT abdomen 08/23/2023  TECHNIQUE:   1) Routine CT Thoracic Spine without IV contrast. Multiplanar reformats. Dose reduction techniques were used.   2) Routine CT Lumbar Spine without IV contrast. Multiplanar reformats. Dose reduction techniques were used.     FINDINGS:   THORACIC SPINE CT:  VERTEBRA: Vertebral body heights and alignment are unchanged with mild chronic superior endplate deformities at T4, T5, and T7 due to Schmorl's nodes. No acute compression fracture or posttraumatic subluxation.     CANAL/FORAMINA: No significant canal or neural foraminal stenosis.    PARASPINAL: No acute extraspinal abnormality.     LUMBAR SPINE CT:  VERTEBRA: Normal vertebral body heights and alignment. No acute compression fracture or posttraumatic subluxation.     CANAL/FORAMINA:  Mild spondylosis without significant canal or foraminal stenosis.    PARASPINAL: No acute extraspinal abnormality. Bilateral sacral nerve stimulators.      Impression    IMPRESSION:  1.  No acute thoracolumbar spine fracture.   Lumbar spine CT w/o contrast    Narrative    EXAM: CT THORACIC SPINE W/O CONTRAST, CT LUMBAR SPINE W/O CONTRAST  LOCATION: [facility]  DATE/TIME: 9/11/2023 11:13 PM CDT    INDICATION:  Trauma, fall, pain  COMPARISON:  CT chest 09/14/2022, CT abdomen 08/23/2023  TECHNIQUE:   1) Routine CT Thoracic Spine without IV contrast. Multiplanar reformats. Dose reduction techniques were used.   2) Routine CT Lumbar Spine without IV contrast. Multiplanar reformats. Dose reduction techniques were used.     FINDINGS:   THORACIC SPINE CT:  VERTEBRA: Vertebral body heights and alignment are unchanged with mild chronic superior endplate deformities at T4, T5, and T7 due to Schmorl's nodes. No acute compression fracture or posttraumatic subluxation.     CANAL/FORAMINA: No significant canal or neural foraminal stenosis.    PARASPINAL: No acute extraspinal abnormality.     LUMBAR SPINE CT:  VERTEBRA: Normal vertebral body heights and alignment. No acute compression fracture or posttraumatic subluxation.     CANAL/FORAMINA:  Mild spondylosis without significant canal or foraminal stenosis.    PARASPINAL: No acute extraspinal abnormality. Bilateral sacral nerve stimulators.      Impression    IMPRESSION:  1.  No acute thoracolumbar spine fracture.

## 2023-09-12 NOTE — ED NOTES
Bed: JNED-06  Expected date: 9/11/23  Expected time: 9:28 PM  Means of arrival: Ambulance  Comments:  Tesfaye  63 yo F poss ingestion

## 2023-09-12 NOTE — ED NOTES
Pt continues talking on phone.  NT has encouraged pt to eat but pt not complying.  RN also in to encourage pt to eat and pt holds up middle finger at RN.

## 2023-09-12 NOTE — SIGNIFICANT EVENT
Patient assisted to bathroom by another RN. Bathroom visit took approximately four minutes. When patient reattached back to monitory O2 reading 78% on room air. Writer instructed patient to take slow, deep breaths as she had not need supplemental O2 while awake during this stay with no change in O2 reading. Placed on 2L O2 nasal canula and patient back up to 92%. Provider (Dr. Marrufo) notified.

## 2023-09-13 ENCOUNTER — PATIENT OUTREACH (OUTPATIENT)
Dept: CARE COORDINATION | Facility: CLINIC | Age: 63
End: 2023-09-13
Payer: MEDICAID

## 2023-09-13 NOTE — PROGRESS NOTES
Connected [facility]    Background: Transitional Care Management program identified per system criteria and reviewed by [facility] team for possible outreach.    Assessment: Upon chart review, [facility] Team member will not proceed with patient outreach related to this episode of Transitional Care Management program due to reason below:    Patient has discharged to a Memory Care, Long-term Care, Assisted Living or Group Home where patient is receiving on-site support with their daily cares, including support with hospital follow up plan.    Plan: Transitional Care Management episode addressed appropriately per reason noted above.      Delia Veronica MA  Connected [facility] Resource Center, [facility]    *Connected Care Resource Team does NOT follow patient ongoing. Referrals are identified based on internal discharge reports and the outreach is to ensure patient has an understanding of their discharge instructions.

## 2023-09-14 LAB — TOPIRAMATE SERPL-MCNC: 15.2 UG/ML

## 2023-10-17 ENCOUNTER — TELEPHONE (OUTPATIENT)
Dept: OPHTHALMOLOGY | Facility: CLINIC | Age: 63
End: 2023-10-17
Payer: MEDICAID

## 2023-10-17 DIAGNOSIS — H53.40 VISUAL FIELD DEFECT: Primary | ICD-10-CM

## 2023-10-17 NOTE — TELEPHONE ENCOUNTER
[facility] Call Center    Phone Message    May a detailed message be left on voicemail: yes     Reason for Call: Other: Patient called stating she missed her appointment this morning due to being in the hospital.  She is asking to reschedule. Per comments on her chart she is to speak with Brooks gardner. Please advise.     Action Taken: Other: eye    Travel Screening: Not Applicable

## 2023-10-17 NOTE — TELEPHONE ENCOUNTER
Called and spoke to Rukhsana       She is going to see her primary eye provider for her eye that is watering     She will call back if she needs an appointment with Dr. Mcclelland - [facility] ok to schedule     [facility] next available with Dr. Stas Bullard Communication Facilitator on 10/17/2023 at 2:57 PM

## 2023-12-06 ENCOUNTER — HOSPITAL ENCOUNTER (EMERGENCY)
Facility: HOSPITAL | Age: 63
Discharge: HOME | End: 2023-12-07
Attending: EMERGENCY MEDICINE | Admitting: EMERGENCY MEDICINE
Payer: MEDICAID

## 2023-12-06 DIAGNOSIS — R07.9 CHEST PAIN, UNSPECIFIED TYPE: ICD-10-CM

## 2023-12-06 PROCEDURE — 96374 THER/PROPH/DIAG INJ IV PUSH: CPT

## 2023-12-06 PROCEDURE — 99285 EMERGENCY DEPT VISIT HI MDM: CPT | Mod: 25

## 2023-12-06 PROCEDURE — 93005 ELECTROCARDIOGRAM TRACING: CPT | Performed by: STUDENT IN AN ORGANIZED HEALTH CARE EDUCATION/TRAINING PROGRAM

## 2023-12-06 PROCEDURE — 93005 ELECTROCARDIOGRAM TRACING: CPT | Performed by: EMERGENCY MEDICINE

## 2023-12-07 ENCOUNTER — APPOINTMENT (OUTPATIENT)
Dept: RADIOLOGY | Facility: HOSPITAL | Age: 63
End: 2023-12-07
Attending: EMERGENCY MEDICINE
Payer: MEDICAID

## 2023-12-07 VITALS
RESPIRATION RATE: 18 BRPM | DIASTOLIC BLOOD PRESSURE: 71 MMHG | OXYGEN SATURATION: 94 % | HEART RATE: 82 BPM | TEMPERATURE: 98.2 F | SYSTOLIC BLOOD PRESSURE: 114 MMHG

## 2023-12-07 LAB
ALBUMIN SERPL BCG-MCNC: 3.9 G/DL (ref 3.5–5.2)
ALP SERPL-CCNC: 143 U/L (ref 40–150)
ALT SERPL W P-5'-P-CCNC: 22 U/L (ref 0–50)
ANION GAP SERPL CALCULATED.3IONS-SCNC: 11 MMOL/L (ref 7–15)
AST SERPL W P-5'-P-CCNC: 29 U/L (ref 0–45)
BASOPHILS # BLD AUTO: 0.1 10E3/UL (ref 0–0.2)
BASOPHILS NFR BLD AUTO: 1 %
BILIRUB SERPL-MCNC: 0.2 MG/DL
BUN SERPL-MCNC: 9.3 MG/DL (ref 8–23)
CALCIUM SERPL-MCNC: 10.3 MG/DL (ref 8.8–10.2)
CHLORIDE SERPL-SCNC: 102 MMOL/L (ref 98–107)
CREAT SERPL-MCNC: 0.61 MG/DL (ref 0.51–0.95)
DEPRECATED HCO3 PLAS-SCNC: 24 MMOL/L (ref 22–29)
EGFRCR SERPLBLD CKD-EPI 2021: >90 ML/MIN/1.73M2
EOSINOPHIL # BLD AUTO: 0.2 10E3/UL (ref 0–0.7)
EOSINOPHIL NFR BLD AUTO: 1 %
ERYTHROCYTE [DISTWIDTH] IN BLOOD BY AUTOMATED COUNT: 17.5 % (ref 10–15)
GLUCOSE BLDC GLUCOMTR-MCNC: 156 MG/DL (ref 70–99)
GLUCOSE BLDC GLUCOMTR-MCNC: 55 MG/DL (ref 70–99)
GLUCOSE SERPL-MCNC: 89 MG/DL (ref 70–99)
HCT VFR BLD AUTO: 48.3 % (ref 35–47)
HGB BLD-MCNC: 15.8 G/DL (ref 11.7–15.7)
HOLD SPECIMEN: NORMAL
IMM GRANULOCYTES # BLD: 0.1 10E3/UL
IMM GRANULOCYTES NFR BLD: 1 %
LACTATE SERPL-SCNC: 1.1 MMOL/L (ref 0.7–2)
LYMPHOCYTES # BLD AUTO: 3.2 10E3/UL (ref 0.8–5.3)
LYMPHOCYTES NFR BLD AUTO: 23 %
MAGNESIUM SERPL-MCNC: 1.7 MG/DL (ref 1.7–2.3)
MCH RBC QN AUTO: 30 PG (ref 26.5–33)
MCHC RBC AUTO-ENTMCNC: 32.7 G/DL (ref 31.5–36.5)
MCV RBC AUTO: 92 FL (ref 78–100)
MONOCYTES # BLD AUTO: 1.5 10E3/UL (ref 0–1.3)
MONOCYTES NFR BLD AUTO: 11 %
NEUTROPHILS # BLD AUTO: 8.7 10E3/UL (ref 1.6–8.3)
NEUTROPHILS NFR BLD AUTO: 63 %
NRBC # BLD AUTO: 0 10E3/UL
NRBC BLD AUTO-RTO: 0 /100
NT-PROBNP SERPL-MCNC: 49 PG/ML (ref 0–900)
PLATELET # BLD AUTO: 244 10E3/UL (ref 150–450)
POTASSIUM SERPL-SCNC: 3.6 MMOL/L (ref 3.4–5.3)
PROT SERPL-MCNC: 7.3 G/DL (ref 6.4–8.3)
RBC # BLD AUTO: 5.26 10E6/UL (ref 3.8–5.2)
SODIUM SERPL-SCNC: 137 MMOL/L (ref 135–145)
TROPONIN T SERPL HS-MCNC: 11 NG/L
TROPONIN T SERPL HS-MCNC: 9 NG/L
WBC # BLD AUTO: 13.8 10E3/UL (ref 4–11)

## 2023-12-07 PROCEDURE — 82962 GLUCOSE BLOOD TEST: CPT

## 2023-12-07 PROCEDURE — 85025 COMPLETE CBC W/AUTO DIFF WBC: CPT | Performed by: EMERGENCY MEDICINE

## 2023-12-07 PROCEDURE — 36415 COLL VENOUS BLD VENIPUNCTURE: CPT | Performed by: EMERGENCY MEDICINE

## 2023-12-07 PROCEDURE — 83880 ASSAY OF NATRIURETIC PEPTIDE: CPT | Performed by: EMERGENCY MEDICINE

## 2023-12-07 PROCEDURE — 84484 ASSAY OF TROPONIN QUANT: CPT | Performed by: EMERGENCY MEDICINE

## 2023-12-07 PROCEDURE — 80053 COMPREHEN METABOLIC PANEL: CPT | Performed by: EMERGENCY MEDICINE

## 2023-12-07 PROCEDURE — 83735 ASSAY OF MAGNESIUM: CPT | Performed by: EMERGENCY MEDICINE

## 2023-12-07 PROCEDURE — 258N000001 HC RX 258: Performed by: EMERGENCY MEDICINE

## 2023-12-07 PROCEDURE — 71045 X-RAY EXAM CHEST 1 VIEW: CPT

## 2023-12-07 PROCEDURE — 83605 ASSAY OF LACTIC ACID: CPT | Performed by: EMERGENCY MEDICINE

## 2023-12-07 RX ORDER — DEXTROSE MONOHYDRATE 25 G/50ML
50 INJECTION, SOLUTION INTRAVENOUS ONCE
Status: COMPLETED | OUTPATIENT
Start: 2023-12-07 | End: 2023-12-07

## 2023-12-07 RX ADMIN — DEXTROSE MONOHYDRATE 50 ML: 25 INJECTION, SOLUTION INTRAVENOUS at 06:27

## 2023-12-07 ASSESSMENT — ACTIVITIES OF DAILY LIVING (ADL)
ADLS_ACUITY_SCORE: 35

## 2023-12-07 NOTE — ED NOTES
[facility]  Expected date:   Expected time:   Means of arrival:   Comments:  63 F sob/cp asa and nit given  [facility]

## 2023-12-07 NOTE — ED PROVIDER NOTES
NAME: Rukhsana Griffith  AGE: 63 year old female  YOB: 1960  MRN: [id number]  EVALUATION DATE & TIME: 2023 11:48 PM    PCP: Kei De Leon    ED PROVIDER: Italo Lance M.D.      Chief Complaint   Patient presents with    Chest Pain         FINAL IMPRESSION:  1. Chest pain, unspecified type        MEDICAL DECISION MAKIN:08 AM I attempted to interview the patient, but the patient is sleeping heavily.  Received report from nursing and EMS.  1:14 AM I spoke with the patient's nurse who reports the patient fell asleep shortly after arrival and has been asleep since. Nurse reports the patient did not seem to have pain upon initial exam. EMS did not know if the patient took sleeping medication prior to arrival.  On recheck patient does awake and reports no symptoms for shaking her head and rolling over to go back to sleep.  4:02 AM I rechecked the patient who is still sleeping and snoring     Patient was clinically assessed and consented to treatment. After assessment, medical decision making and workup were discussed with the patient. The patient was agreeable to plan for testing, workup, and treatment.  Pertinent Labs & Imaging studies reviewed. (See chart for details)       Medical Decision Making    History:  Supplemental history from: Documented in chart, if applicable  External Record(s) reviewed: Documented in chart, if applicable.    Work Up:  Chart documentation includes differential considered and any EKGs or imaging independently interpreted by provider, where specified.  In additional to work up documented, I considered the following work up: Documented in chart, if applicable.    External consultation:  Discussion of management with another provider: Documented in chart, if applicable    Complicating factors:  Care impacted by chronic illness: Chronic Lung Disease, Diabetes, and Mental Health  Care affected by social determinants of health: N/A    Disposition  considerations: Discharge. No recommendations on prescription strength medication(s). See documentation for any additional details.        Rukhsana Griffith is a 63 year old female who presents with chest pain.   Differential diagnosis includes but not limited to acute coronary syndrome, CHF exacerbation, COPD exacerbation, pneumonia.  Patient with history of chest pain and presentation similar outside chart review.  Patient's EKG was not remarkable for any acute ischemic process.  Patient otherwise comfortable and sleeping here after arrival with EMS.  I did have trouble waking her up but after deep sternal rub she did wake up.  She did confirm taking some evening medications which in her chart due to some sleep medications.  I do feel this is likely why she is sleepy.  She is not desaturating or have any difficulties with oxygenation though she is snoring heavily.  Labs were obtained after EKG and showed negative troponin, unremarkable CBC except for a slight leukocytosis and unremarkable metabolic panel.  Following negative labs chest x-ray was also checked which was clear.  I did repeat troponin as well which was negative again.  Patient without any findings of acute distress and on waking multiple times but does not having any chest pain anymore.  After sleeping patient's blood sugar was checked with plan for discharge but was low.  Patient given oral intake and dose of D50.  With this and improved and following discussion with patient she is comfortable going home and continuing regular meds and eating at home along with rechecks of blood sugar.    0 minutes of critical care time    MEDICATIONS GIVEN IN THE EMERGENCY:  Medications   dextrose 50 % injection 50 mL (50 mLs Intravenous $Given 12/7/23 0627)       NEW PRESCRIPTIONS STARTED AT TODAY'S ER VISIT:  Discharge Medication List as of 12/7/2023  7:31 AM             =================================================================    HPI    Patient information  was obtained from: Triage note    Use of : N/A       Rukhsana Griffith is a 63 year old female with a past medical history of asthma, COPD, HTN, seizure disorder, DM type II, schizoaffective disorder, tobacco abuse, and history of methamphetamine abuse who presents with chest pain     Per triage note: The patient comes from home where she started have left sided chest pain at around 10:15 PM tonight. The patient was given full dose aspirin and nitroglycerin en route via EMS.   The patient is speaking in full sentences and has some prior deficits from prior CVA and is at baseline.     REVIEW OF SYSTEMS   Review of Systems   Cardiovascular:  Positive for chest pain.        PAST MEDICAL HISTORY:  Past Medical History:   Diagnosis Date    COPD (chronic obstructive pulmonary disease) (H)     Diabetes (H)     HLD (hyperlipidemia)     Methamphetamine abuse (H)     Schizoaffective disorder (H)     Seizures (H)        PAST SURGICAL HISTORY:  Past Surgical History:   Procedure Laterality Date    HYSTERECTOMY      KNEE SURGERY      OTHER SURGICAL HISTORY      gastric pacemaker    OTHER SURGICAL HISTORY      interstim placement    VA REVISE/REMOVE PERIPH/GASTRIC NEUROSTIM/ N/A 5/15/2019    Procedure: PARTIAL REMOVAL OLD LEAD;  Surgeon: Jcarlos Muñiz MD;  Location: [facility];  Service: Urology    RELEASE CARPAL TUNNEL         CURRENT MEDICATIONS:    No current facility-administered medications for this encounter.    Current Outpatient Medications:     acetaminophen (TYLENOL) 160 mg/5 mL (5 mL) Soln solution, [ACETAMINOPHEN (TYLENOL) 160 MG/5 ML (5 ML) SOLN SOLUTION] Take 10 mL by mouth every 4 (four) hours as needed (Pain).       , Disp: , Rfl:     albuterol (PROAIR HFA;PROVENTIL HFA;VENTOLIN HFA) 90 mcg/actuation inhaler, [ALBUTEROL (PROAIR HFA;PROVENTIL HFA;VENTOLIN HFA) 90 MCG/ACTUATION INHALER] Inhale 2 puffs every 6 (six) hours as needed for wheezing., Disp: , Rfl:     albuterol (PROVENTIL) 2.5  mg /3 mL (0.083 %) nebulizer solution, [ALBUTEROL (PROVENTIL) 2.5 MG /3 ML (0.083 %) NEBULIZER SOLUTION] Take 2.5 mg by nebulization every 4 (four) hours as needed for wheezing.       , Disp: , Rfl:     aluminum-magnesium hydroxide-simethicone (MAALOX MAX STRENGTH) 400-400-40 mg/5 mL suspension, [ALUMINUM-MAGNESIUM HYDROXIDE-SIMETHICONE (MAALOX MAX STRENGTH) 400-400-40 MG/5 ML SUSPENSION] Take 5 mL by mouth every 4 (four) hours as needed for indigestion.       , Disp: , Rfl:     ammonium lactate (AMLACTIN) 12 % cream, [AMMONIUM LACTATE (AMLACTIN) 12 % CREAM] Apply 1 application topically 2 (two) times a day.       , Disp: , Rfl:     atropine 1 % ophthalmic solution, [ATROPINE 1 % OPHTHALMIC SOLUTION] Place 1-2 drops under the tongue 2 (two) times a day. Takes under tongue to stop drooling      , Disp: , Rfl:     budesonide-formoterol (SYMBICORT) 160-4.5 mcg/actuation inhaler, [BUDESONIDE-FORMOTEROL (SYMBICORT) 160-4.5 MCG/ACTUATION INHALER] Inhale 2 puffs 2 (two) times a day.       , Disp: , Rfl:     cholecalciferol, vitamin D3, 2,000 unit Tab, [CHOLECALCIFEROL, VITAMIN D3, 2,000 UNIT TAB] Take 2,000 Units by mouth daily., Disp: , Rfl:     cloZAPine (CLOZARIL) 100 MG tablet, [CLOZAPINE (CLOZARIL) 100 MG TABLET] Take 200 mg by mouth at bedtime.       , Disp: , Rfl:     diphenhydrAMINE (BENADRYL) 25 mg capsule, [DIPHENHYDRAMINE (BENADRYL) 25 MG CAPSULE] Take 25 mg by mouth at bedtime as needed for itching., Disp: , Rfl:     docusate sodium (COLACE) 100 MG capsule, [DOCUSATE SODIUM (COLACE) 100 MG CAPSULE] Take 100 mg by mouth 2 (two) times a day.       , Disp: , Rfl:     EPINEPHrine (EPIPEN) 0.3 mg/0.3 mL atIn, [EPINEPHRINE (EPIPEN) 0.3 MG/0.3 ML ATIN] Inject 0.3 mg into the shoulder, thigh, or buttocks as needed., Disp: , Rfl:     escitalopram oxalate (LEXAPRO) 10 MG tablet, [ESCITALOPRAM OXALATE (LEXAPRO) 10 MG TABLET] Take 10 mg by mouth daily.       , Disp: , Rfl:     famotidine (PEPCID) 40 MG tablet, [FAMOTIDINE  (PEPCID) 40 MG TABLET] Take 40 mg by mouth 2 (two) times a day., Disp: , Rfl:     fluoride, sodium, (DENTAGEL) 1.1 % Gel dental gel, [FLUORIDE, SODIUM, (DENTAGEL) 1.1 % GEL DENTAL GEL] Apply 1 application to teeth at bedtime., Disp: , Rfl:     glucagon, human recombinant, (GLUCAGEN HYPOKIT) 1 mg injection, [GLUCAGON, HUMAN RECOMBINANT, (GLUCAGEN HYPOKIT) 1 MG INJECTION] Infuse 1 mg into a venous catheter once as needed (Low blood sugars).       , Disp: , Rfl:     insulin aspart U-100 (NOVOLOG) 100 unit/mL injection, [INSULIN ASPART U-100 (NOVOLOG) 100 UNIT/ML INJECTION] Inject 15 Units under the skin 3 (three) times a day before meals. Sliding scale       , Disp: , Rfl:     insulin glargine (LANTUS) 100 unit/mL injection, [INSULIN GLARGINE (LANTUS) 100 UNIT/ML INJECTION] Inject 25 Units under the skin every morning.       , Disp: , Rfl:     ipratropium-albuterol (DUO-NEB) 0.5-2.5 mg/3 mL nebulizer, [IPRATROPIUM-ALBUTEROL (DUO-NEB) 0.5-2.5 MG/3 ML NEBULIZER] Take 3 mL by nebulization 4 (four) times a day as needed.       , Disp: , Rfl:     lisinopril (PRINIVIL,ZESTRIL) 10 MG tablet, [LISINOPRIL (PRINIVIL,ZESTRIL) 10 MG TABLET] Take 10 mg by mouth daily., Disp: , Rfl:     loratadine (CLARITIN) 10 mg tablet, [LORATADINE (CLARITIN) 10 MG TABLET] Take 10 mg by mouth daily., Disp: , Rfl:     lubiprostone (AMITIZA) 24 MCG capsule, [LUBIPROSTONE (AMITIZA) 24 MCG CAPSULE] Take 24 mcg by mouth 2 (two) times a day., Disp: , Rfl:     magnesium hydroxide (MILK OF MAG) 400 mg/5 mL Susp suspension, [MAGNESIUM HYDROXIDE (MILK OF MAG) 400 MG/5 ML SUSP SUSPENSION] Take 30 mL by mouth 2 (two) times a day as needed.       , Disp: , Rfl:     mometasone (NASONEX) 50 mcg/actuation nasal spray, [MOMETASONE (NASONEX) 50 MCG/ACTUATION NASAL SPRAY] 2 sprays into each nostril daily., Disp: , Rfl:     multivitamin with minerals (THERA-M) 9 mg iron-400 mcg Tab tablet, [MULTIVITAMIN WITH MINERALS (THERA-M) 9 MG IRON-400 MCG TAB TABLET] Take 1  tablet by mouth daily., Disp: , Rfl:     nicotine (NICODERM CQ) 21 mg/24 hr, [NICOTINE (NICODERM CQ) 21 MG/24 HR] Place 1 patch on the skin daily as needed for smoking cessation.       , Disp: , Rfl:     oxyCODONE-acetaminophen (PERCOCET/ENDOCET) 5-325 mg per tablet, [OXYCODONE-ACETAMINOPHEN (PERCOCET/ENDOCET) 5-325 MG PER TABLET] Take 1 tablet by mouth every 6 (six) hours as needed for pain., Disp: 12 tablet, Rfl: 0    polyethylene glycol (MIRALAX) 17 gram packet, [POLYETHYLENE GLYCOL (MIRALAX) 17 GRAM PACKET] Take 17 g by mouth 2 (two) times a day.       , Disp: , Rfl:     pravastatin (PRAVACHOL) 40 MG tablet, [PRAVASTATIN (PRAVACHOL) 40 MG TABLET] Take 40 mg by mouth every morning.       , Disp: , Rfl:     ramelteon (ROZEREM) 8 mg tablet, [RAMELTEON (ROZEREM) 8 MG TABLET] Take 8 mg by mouth at bedtime., Disp: , Rfl:     SUMAtriptan (IMITREX) 100 MG tablet, [SUMATRIPTAN (IMITREX) 100 MG TABLET] Take 100 mg by mouth every 2 (two) hours as needed for migraine., Disp: , Rfl:     topiramate (TOPAMAX) 200 MG tablet, [TOPIRAMATE (TOPAMAX) 200 MG TABLET] Take 200 mg by mouth 2 (two) times a day., Disp: , Rfl:     triamcinolone (KENALOG) 0.1 % ointment, [TRIAMCINOLONE (KENALOG) 0.1 % OINTMENT] Apply 1 application topically 2 (two) times a day., Disp: , Rfl:     trimethobenzamide (TIGAN) 300 mg capsule, [TRIMETHOBENZAMIDE (TIGAN) 300 MG CAPSULE] Take 300 mg by mouth 3 (three) times a day as needed (N/V)., Disp: , Rfl:     ALLERGIES:  Allergies   Allergen Reactions    Aspirin Unknown     Other reaction(s): Bleeding    Bee Sting Kit [Bee Venom] Anaphylaxis    Botox [Onabotulinumtoxina] Hives and Shortness Of Breath    Garlic Hives    Hydrocodone Headache, Hives and Itching    Hydromorphone Other (See Comments) and Rash     Seizures    Hydroxyzine Hives, Other (See Comments), Anaphylaxis and Shortness Of Breath     Dyspnea    Hymenoptera Allergenic Extract [Wasp Venom Protein] Anaphylaxis    Iodinated Contrast Media Hives,  "Other (See Comments), Itching and Swelling     EDEMA    Latex Shortness Of Breath    Lidocaine Hives     lidoderm patch only; tolerates injectable lidocaine    Onion Hives     green, red, yellow, mushrooms, garlic---all cause pt to have hives    Oxycodone Hives and Rash    Procaine Hives     tolerates injectable bupivacaine and lidocaine      Sucralose Anaphylaxis    Diazepam Rash     also: parasomnia, as pt reported waking up in bathtub    Haloperidol Rash    Ketorolac Rash    Lithium Rash    Meperidine Rash    Diatrizoate Meglumine [Diatrizoate] Hives and Swelling    Green Pepper [Capsicum] Hives    Metformin Other (See Comments)     \"Stomach bleeding and kidney infection\"    Adhesive Tape-Silicones [Adhesive Tape] Rash    [place] [Nuts] Rash    Cephalosporins Rash    Chlorpromazine Rash     THORAZINE    Chlorpropamide Rash    Coconut (Cocos Nucifera) Rash    Codeine Headache     Migraines       Darvocet A500 [Propoxyphene N-Apap] Rash    Dipyridamole Rash    Fentanyl Rash    Fluoxetine Rash    Furosemide Rash    Glyburide Other (See Comments) and Rash     Other reaction(s): hypoglycemia (patient is very sensitive to sulfonylureas)    Ondansetron Rash    Penicillins Rash and Other (See Comments)     Migraines, dizzy and sweating    Shellfish Containing Products [Shellfish-Derived Products] Rash    Sulfa (Sulfonamide Antibiotics) [Sulfa Antibiotics] Rash    Thallium-201 [Thallous Chloride Tl 201] Rash    Thioridazine Rash    Tizanidine Rash       FAMILY HISTORY:  Family History   Adopted: Yes       SOCIAL HISTORY:   Social History     Socioeconomic History    Marital status:    Tobacco Use    Smoking status: Every Day     Packs/day: .25     Types: Cigarettes    Smokeless tobacco: Never   Substance and Sexual Activity    Alcohol use: Not Currently    Drug use: Not Currently       PHYSICAL EXAM:    Vitals: /71   Pulse 82   Temp 98.2  F (36.8  C) (Oral)   Resp 18   SpO2 94%    Physical Exam  Vitals and " nursing note reviewed.   Constitutional:       General: She is not in acute distress.     Appearance: Normal appearance. She is obese. She is not ill-appearing or toxic-appearing.      Comments: Sleeping heavily, arouses with physical stimulus   HENT:      Head: Normocephalic.   Cardiovascular:      Rate and Rhythm: Normal rate and regular rhythm.      Heart sounds: Normal heart sounds.   Pulmonary:      Effort: Pulmonary effort is normal. No respiratory distress.      Breath sounds: Normal breath sounds. No stridor. No wheezing, rhonchi or rales.   Abdominal:      Palpations: Abdomen is soft.      Tenderness: There is no abdominal tenderness.   Musculoskeletal:         General: Normal range of motion.      Cervical back: Normal range of motion.      Right lower leg: No edema.      Left lower leg: No edema.   Skin:     General: Skin is warm and dry.      Coloration: Skin is not pale.   Neurological:      Cranial Nerves: No cranial nerve deficit.      Coordination: Coordination normal.           LAB:  All pertinent labs reviewed and interpreted.  Labs Ordered and Resulted from Time of ED Arrival to Time of ED Departure   COMPREHENSIVE METABOLIC PANEL - Abnormal       Result Value    Sodium 137      Potassium 3.6      Carbon Dioxide (CO2) 24      Anion Gap 11      Urea Nitrogen 9.3      Creatinine 0.61      GFR Estimate >90      Calcium 10.3 (*)     Chloride 102      Glucose 89      Alkaline Phosphatase 143      AST 29      ALT 22      Protein Total 7.3      Albumin 3.9      Bilirubin Total 0.2     CBC WITH PLATELETS AND DIFFERENTIAL - Abnormal    WBC Count 13.8 (*)     RBC Count 5.26 (*)     Hemoglobin 15.8 (*)     Hematocrit 48.3 (*)     MCV 92      MCH 30.0      MCHC 32.7      RDW 17.5 (*)     Platelet Count 244      % Neutrophils 63      % Lymphocytes 23      % Monocytes 11      % Eosinophils 1      % Basophils 1      % Immature Granulocytes 1      NRBCs per 100 WBC 0      Absolute Neutrophils 8.7 (*)     Absolute  Lymphocytes 3.2      Absolute Monocytes 1.5 (*)     Absolute Eosinophils 0.2      Absolute Basophils 0.1      Absolute Immature Granulocytes 0.1      Absolute NRBCs 0.0     GLUCOSE BY METER - Abnormal    GLUCOSE BY METER POCT 55 (*)    GLUCOSE BY METER - Abnormal    GLUCOSE BY METER POCT 156 (*)    LACTIC ACID WHOLE BLOOD - Normal    Lactic Acid 1.1     MAGNESIUM - Normal    Magnesium 1.7     TROPONIN T, HIGH SENSITIVITY - Normal    Troponin T, High Sensitivity 11     NT PROBNP INPATIENT - Normal    N terminal Pro BNP Inpatient 49     TROPONIN T, HIGH SENSITIVITY - Normal    Troponin T, High Sensitivity 9         RADIOLOGY:  XR Chest 1 View   Final Result   IMPRESSION: Lungs are clear. No pleural effusion or pneumothorax. Normal heart size and pulmonary vascularity.          EKG:     Performed at: 12/07/23 at 12:02 AM  Impression: Sinus rhythm with normal EKG, no signs of acute ischemic findings and unchanged from prior EKGs.  Rate: 85  Rhythm: Sinus Rhythm   QRS Interval: 86  QTc Interval: 454  Comparison: When compared to EKG from 9/11/23- no significant change was found      I reviewed and independently interpreted the patient's EKG, with comments made as listed above.     PROCEDURES:   Procedures       I, Suri Espinoza, am serving as a scribe to document services personally performed by Dr. Italo Lance  based on my observation and the provider's statements to me. I, Italo Lance MD attest that Suri Espinoza is acting in a scribe capacity, has observed my performance of the services and has documented them in accordance with my direction.      Italo Lance M.D.  Emergency Medicine  [facility] EMERGENCY DEPARTMENT  [address] [place] [id number]  [phone]  Dept: [phone]       Italo Lance MD  12/08/23 5233

## 2023-12-07 NOTE — ED NOTES
Attempted to wake pt up for discharge. Pt unable to wake up and communicate how she can get home. Will attempt again

## 2023-12-07 NOTE — ED TRIAGE NOTES
Pt BIB EMS from home. Pt states that she started having Left chest pain starting at 2215 tonight. Pt was given 324 ASA and Nitroglycerin x1.  Pt speaking in full sentences. Pt has deficits from previous cva and is at baseline

## 2023-12-09 LAB
ATRIAL RATE - MUSE: 85 BPM
DIASTOLIC BLOOD PRESSURE - MUSE: 61 MMHG
INTERPRETATION ECG - MUSE: NORMAL
P AXIS - MUSE: 75 DEGREES
PR INTERVAL - MUSE: 204 MS
QRS DURATION - MUSE: 86 MS
QT - MUSE: 382 MS
QTC - MUSE: 454 MS
R AXIS - MUSE: 82 DEGREES
SYSTOLIC BLOOD PRESSURE - MUSE: 127 MMHG
T AXIS - MUSE: 57 DEGREES
VENTRICULAR RATE- MUSE: 85 BPM

## 2023-12-22 ENCOUNTER — HOSPITAL ENCOUNTER (EMERGENCY)
Facility: HOSPITAL | Age: 63
Discharge: HOME | End: 2023-12-22
Attending: STUDENT IN AN ORGANIZED HEALTH CARE EDUCATION/TRAINING PROGRAM | Admitting: STUDENT IN AN ORGANIZED HEALTH CARE EDUCATION/TRAINING PROGRAM
Payer: MEDICAID

## 2023-12-22 ENCOUNTER — APPOINTMENT (OUTPATIENT)
Dept: RADIOLOGY | Facility: HOSPITAL | Age: 63
End: 2023-12-22
Attending: STUDENT IN AN ORGANIZED HEALTH CARE EDUCATION/TRAINING PROGRAM
Payer: MEDICAID

## 2023-12-22 VITALS
TEMPERATURE: 98.9 F | RESPIRATION RATE: 18 BRPM | DIASTOLIC BLOOD PRESSURE: 67 MMHG | HEART RATE: 90 BPM | OXYGEN SATURATION: 100 % | SYSTOLIC BLOOD PRESSURE: 150 MMHG

## 2023-12-22 DIAGNOSIS — H10.9 CONJUNCTIVITIS OF LEFT EYE, UNSPECIFIED CONJUNCTIVITIS TYPE: ICD-10-CM

## 2023-12-22 PROCEDURE — 71046 X-RAY EXAM CHEST 2 VIEWS: CPT

## 2023-12-22 PROCEDURE — 99284 EMERGENCY DEPT VISIT MOD MDM: CPT | Mod: 25

## 2023-12-22 RX ORDER — CLINDAMYCIN HCL 300 MG
300 CAPSULE ORAL 3 TIMES DAILY
Qty: 21 CAPSULE | Refills: 0 | Status: SHIPPED | OUTPATIENT
Start: 2023-12-22 | End: 2023-12-22

## 2023-12-22 RX ORDER — CIPROFLOXACIN HYDROCHLORIDE 3.5 MG/ML
1-2 SOLUTION/ DROPS TOPICAL EVERY 4 HOURS
Qty: 5 ML | Refills: 0 | Status: SHIPPED | OUTPATIENT
Start: 2023-12-22 | End: 2023-12-29

## 2023-12-22 RX ORDER — CLINDAMYCIN HCL 300 MG
300 CAPSULE ORAL 3 TIMES DAILY
Qty: 21 CAPSULE | Refills: 0 | Status: ON HOLD | OUTPATIENT
Start: 2023-12-22 | End: 2024-05-23

## 2023-12-22 ASSESSMENT — ACTIVITIES OF DAILY LIVING (ADL): ADLS_ACUITY_SCORE: 35

## 2023-12-23 NOTE — ED TRIAGE NOTES
Pt arrives via EMS for left eye pain, swelling, and drainage. Pt also c/o cough and sore throat. Pt is on 3L via nasal cannula chronically.

## 2023-12-23 NOTE — ED PROVIDER NOTES
Emergency Department Encounter         FINAL IMPRESSION:  Conjunctivitis           ED COURSE AND MEDICAL DECISION MAKING     63-year-old female with extensive past medical history, here with left eye conjunctivitis and irritation for the last few days.  Also endorsing cough for last few days, congestion runny nose and sore throat.  No fevers chills nausea vomiting.  No leg swelling.  No abdominal pain, chest pain, decreased p.o. dysuria or bowel movement changes.    On arrival here she looks well.  Vitals are stable.  On home oxygen chronically.  Lungs are clear bilaterally.  She has obvious conjunctivitis to left eye with surrounding mild erythema suggesting potential early periorbital cellulitis.  Throat is normal.  TMs clear.  No meningismus.  No signs of retrobulbar pathology.  No vesicular lesions.  No headache.  Because of patient's cough and history of pneumonia, she is requesting a chest x-ray.  Will obtain chest x-ray, give her eyedrops as well as Keflex to cover for potential early periorbital cellulitis.                          Medical Decision Making    History:  Supplemental history from: Documented in chart, if applicable  External Record(s) reviewed: Documented in chart, if applicable.    Work Up:  Chart documentation includes differential considered and any EKGs or imaging independently interpreted by provider, where specified.  In additional to work up documented, I considered the following work up: Documented in chart, if applicable.    External consultation:  Discussion of management with another provider: Documented in chart, if applicable    Complicating factors:  Care impacted by chronic illness: Cerebrovascular Disease, Chronic Kidney Disease, Chronic Lung Disease, Chronic Pain, Diabetes, Heart Disease, Hyperlipidemia, Hypertension, Mental Health, Peripheral Vascular Disease, and Smoking / Nicotine Use  Care affected by social determinants of health: N/A    Disposition considerations:  Discharge. I prescribed additional prescription strength medication(s) as charted. See documentation for any additional details.                  EKG        Critical Care     Performed by: Ziyad Biswas or    Authorized by: Ziyad Biswas  Total critical care time:  minutes  Critical care was necessary to treat or prevent imminent or life-threatening deterioration of the following conditions:   Critical care was time spent personally by me on the following activities: development of treatment plan with patient or surrogate, discussions with consultants, examination of patient, evaluation of patient's response to treatment, obtaining history from patient or surrogate, ordering and performing treatments and interventions, ordering and review of laboratory studies, ordering and review of radiographic studies, re-evaluation of patient's condition and monitoring for potential decompensation.  Critical care time was exclusive of separately billable procedures and treating other patients.'    At the conclusion of the encounter I discussed the results of all the tests and the disposition. The questions were answered. The patient or family acknowledged understanding and was agreeable with the care plan.        MEDICATIONS GIVEN IN THE EMERGENCY DEPARTMENT:  Medications - No data to display    NEW PRESCRIPTIONS STARTED AT TODAY'S ED VISIT:  New Prescriptions    CIPROFLOXACIN (CILOXAN) 0.3 % OPHTHALMIC SOLUTION    Place 1-2 drops into both eyes every 4 hours for 7 days       HPI     53-year-old with extensive past medical history here from home with viral-like symptoms including cough, congestion, left eye exudate, sore throat, runny nose.  No fevers, chest pain or significant dyspnea on exertion.  No abdominal pain, dysuria or bowel movement changes.  No rash, no headache or neck stiffness          MEDICAL HISTORY     Past Medical History:   Diagnosis Date    COPD (chronic obstructive pulmonary disease) (H)     Diabetes (H)     HLD  (hyperlipidemia)     Methamphetamine abuse (H)     Schizoaffective disorder (H)     Seizures (H)        Past Surgical History:   Procedure Laterality Date    HYSTERECTOMY      KNEE SURGERY      OTHER SURGICAL HISTORY      gastric pacemaker    OTHER SURGICAL HISTORY      interstim placement    TX REVISE/REMOVE PERIPH/GASTRIC NEUROSTIM/ N/A 5/15/2019    Procedure: PARTIAL REMOVAL OLD LEAD;  Surgeon: Jcarlos Muñiz MD;  Location: [facility];  Service: Urology    RELEASE CARPAL TUNNEL         Social History     Tobacco Use    Smoking status: Every Day     Packs/day: .25     Types: Cigarettes    Smokeless tobacco: Never   Substance Use Topics    Alcohol use: Not Currently    Drug use: Not Currently       ciprofloxacin (CILOXAN) 0.3 % ophthalmic solution  acetaminophen (TYLENOL) 160 mg/5 mL (5 mL) Soln solution  albuterol (PROAIR HFA;PROVENTIL HFA;VENTOLIN HFA) 90 mcg/actuation inhaler  albuterol (PROVENTIL) 2.5 mg /3 mL (0.083 %) nebulizer solution  aluminum-magnesium hydroxide-simethicone (MAALOX MAX STRENGTH) 400-400-40 mg/5 mL suspension  ammonium lactate (AMLACTIN) 12 % cream  atropine 1 % ophthalmic solution  budesonide-formoterol (SYMBICORT) 160-4.5 mcg/actuation inhaler  cholecalciferol, vitamin D3, 2,000 unit Tab  cloZAPine (CLOZARIL) 100 MG tablet  diphenhydrAMINE (BENADRYL) 25 mg capsule  docusate sodium (COLACE) 100 MG capsule  EPINEPHrine (EPIPEN) 0.3 mg/0.3 mL atIn  escitalopram oxalate (LEXAPRO) 10 MG tablet  famotidine (PEPCID) 40 MG tablet  fluoride, sodium, (DENTAGEL) 1.1 % Gel dental gel  glucagon, human recombinant, (GLUCAGEN HYPOKIT) 1 mg injection  insulin aspart U-100 (NOVOLOG) 100 unit/mL injection  insulin glargine (LANTUS) 100 unit/mL injection  ipratropium-albuterol (DUO-NEB) 0.5-2.5 mg/3 mL nebulizer  lisinopril (PRINIVIL,ZESTRIL) 10 MG tablet  loratadine (CLARITIN) 10 mg tablet  lubiprostone (AMITIZA) 24 MCG capsule  magnesium hydroxide (MILK OF MAG) 400 mg/5 mL Susp  suspension  mometasone (NASONEX) 50 mcg/actuation nasal spray  multivitamin with minerals (THERA-M) 9 mg iron-400 mcg Tab tablet  nicotine (NICODERM CQ) 21 mg/24 hr  oxyCODONE-acetaminophen (PERCOCET/ENDOCET) 5-325 mg per tablet  polyethylene glycol (MIRALAX) 17 gram packet  pravastatin (PRAVACHOL) 40 MG tablet  ramelteon (ROZEREM) 8 mg tablet  SUMAtriptan (IMITREX) 100 MG tablet  topiramate (TOPAMAX) 200 MG tablet  triamcinolone (KENALOG) 0.1 % ointment  trimethobenzamide (TIGAN) 300 mg capsule            PHYSICAL EXAM     BP (!) 151/68   Pulse 93   Temp 98.9  F (37.2  C) (Temporal)   Resp 20   SpO2 97%       PHYSICAL EXAM:     General: Patient appears well, nontoxic, comfortable  HEENT: Moist mucous membranes,  No head trauma.  No uvular deviation, no tonsillar asymmetry, no stridor, no drooling, no exudates, no redness.  TMs clear bilaterally.  Left with exudate.  Mild scleral injection.  No hyphema or hypopyon.  Full range of motion of the eyes bilaterally.  No pain with extraocular eye motion.  Mild erythema surrounding the left eye.  No streaking.  No erysipelas.  No signs of mastoiditis.  Full range of motion of the neck.  Cardiovascular: Normal rate, normal rhythm, no extremity edema.  No appreciable murmur.  Respiratory: No signs of respiratory distress, lungs are clear to auscultation bilaterally with no wheezes rhonchi or rales.  Abdominal: Soft, nontender, nondistended, no palpable masses, no guarding, no rebound  Musculoskeletal: Full range of motion of joints, no deformities appreciated.  Neurological: Alert and oriented, grossly neurologically intact.  Psychological: Normal affect and mood.  Integument: No rashes appreciated          RESULTS       Labs Ordered and Resulted from Time of ED Arrival to Time of ED Departure - No data to display    Chest XR,  PA & LAT    (Results Pending)         PROCEDURES:  Procedures:  Procedures         Ziyad Biswas, DO  Emergency Medicine  [facility]  [facility] EMERGENCY DEPARTMENT       Ohl, Ziyad Urias, DO  12/22/23 [id number]

## 2024-01-22 ENCOUNTER — TRANSCRIBE ORDERS (OUTPATIENT)
Dept: OTHER | Age: 64
End: 2024-01-22

## 2024-01-22 DIAGNOSIS — H53.8 BLURRED VISION, BILATERAL: Primary | ICD-10-CM

## 2024-05-21 NOTE — H&P (VIEW-ONLY)
[facility]      Preoperative Consultation   Rukhsana Griffith   : 1960   Gender: female    Date of Encounter: 2024    Nursing Notes:     Rukhsana Griffith is a 62 y.o. female (1960) who presents for preop evaluation spinal cyst removal fasectectomy with decompression right L4-5  Date of Surgery: 24  Surgical Specialty:   Hospital/Surgical Facility:[facility]  Fax number:   Surgery type: inpatient  Primary Physician: Kei De Leon               History of Present Illness   58 year old woman with history of falls, gait instability nad back pain found to have a synovial cyst felt to be the culprit for her lower extremity issues and scheduled for resection . Is in her baseline status though has started smoking aian after stopping for 8 months due to anxiety around her living situation.     Review of Systems     General: NEGATIVE  for: fevers, fatigue, weight loss, night sweats, sleep disturbance  Eyes: NEGATIVE  for:  vision loss,  blurring,  diplopia,  pain and  dryness  Ears/Nose/Throat: NEGATIVE for: sore throat, hoarseness, nosebleeds, sinus pain / pressure, post nasal drip  Cardiovascular: NEGATIVE for: chest pain, chest pressure, dyspnea on exertion, peripheral edema, orthopnea, paroxysmal nocturnal dyspnea, palpitations, syncope  Respiratory: NEGATIVE for: cough, dyspnea, hemoptysis, sleep apnea, wheezing  Gastrointestinal: NEGATIVE  for: abdominal pain, nausea, vomiting, heartburn, early satiety, change in bowel habits, diarrhea, constipation, melena  Genitourinary - female: NEGATIVE  for: urinary frequency, dysuria, hematuria, stress incontinence, abnormal vaginal bleeding, menorrhagia, vaginal discharge   Musculoskeletal: POSITIVE: Back pain NEGATIVE  for: , joint swelling, joint redness, stiffness  Skin: NEGATIVE  for: change in mole(s), pigment change, suspicious lesions, rash  Neurologic: POSITIVE: Lower extremity weakness right greater than left with balance  issues and frequent falls  NEGATIVE  for: headache, numbness, paresthesias, seizures, vertigo  Psychiatric: NEGATIVE for: anxiety, depression, irritable, suicidal ideation, paranoia  Endocrine: NEGATIVE for: menstrual change, heat intolerance, cold intolerance, weight change, polyuria, polydipsia, polyphagia  Heme/Lymphatic: NEGATIVE  for: abnormal bruising, abnormal bleeding, enlarged lymph nodes  Allergic/Immunologic: NEGATIVE  for: hay fever, hives    Anesthesia: Has had nausea and sedation post surgery in the past that has required an extra day in hospital post procedure    Patient Active Problem List   Diagnosis Code     Type II diabetes mellitus with complications E11.8     Schizoaffective disorder, chronic condition (HC) F25.8     Migraine G43.909     Osteoporosis M81.0     Irritable bowel syndrome, chronic colitis K58.9     Asthma J45.909     Neutrophilic leukocytosis, chronic D72.9     Hyponatremia chronic stable E87.1     Seizure disorder, grand mal (HC) G40.409     Dyslipidemia, goal LDL below 100 E78.5     Hypertension I10     Unilateral weakness R53.1     COPD mixed type (HC) J44.9     ACP (advance care planning) Z71.89     Anxiety F41.9     Diabetic gastroparesis (HC) E11.43, K31.84     Peripheral neuropathy G62.9     Iron deficiency anemia D50.9     Unspecified visual disturbance H53.9     Chronic abdominal pain R10.9, G89.29     Mental status, decreased R41.82     GI bleeding K92.2     Vitamin D deficiency E55.9     Tobacco abuse Z72.0     Personality disorder (HC) F60.9     Psychophysiological insomnia F51.04     Perennial allergic rhinitis J30.89     Pain of right hand M79.641     Acute hypoxemic respiratory failure (HC) J96.01     Calcified granulomas of lung by CT (HC) J84.10     Increased urinary frequency R35.0     Hypokalemia E87.6     Abdominal pain, vomiting, and diarrhea R10.9, R11.10, R19.7     Current Outpatient Medications   Medication Sig     acetaminophen (TYLENOL) 160 mg/5 mL elixir  Take 10 mL by mouth every 4 hours if needed. Max acetaminophen dose: 4000mg in 24 hrs.     albuterol (PROVENTIL) 0.083 % neb solution Inhale 3 mL via a nebulizer every 4 hours if needed (3rd choice for shortness of breath or wheezing).     albuterol HFA (PRO-AIR,VENTOLIN,PROVENTIL) 90 mcg/actuation inhaler Inhale 2 Puffs by mouth 4 times daily if needed.     albuterol HFA (PRO-AIR,VENTOLIN,PROVENTIL) 90 mcg/actuation inhaler Inhale 2 Puffs by mouth every 4 hours if needed. 1st choice for shortness of breath or wheezing.     albuterol-ipratropium (DUONEB) (2.5-0.5 mg) in 3 mL NEBULIZATION solution Inhale 3 mL via a nebulizer 4 times daily if needed.     albuterol-ipratropium (DUONEB) (2.5-0.5 mg) in 3 mL NEBULIZATION solution Inhale 3 mL via a nebulizer every 6 hours if needed. 2nd choice for shortness of breath or wheezing.     ammonium lactate 12% (LACHYDRIN) 12 % cream Apply topically twice daily as needed for dry skin after baths     atropine (ISOPTO ATROPINE) 1 % ophthalmic solution INSTILL 1-2 DROPS UNDER THE TONGUE TWICE A DAY .  MAY SELF ADMINISTER     blood sugar diagnostic (BLOOD GLUCOSE TEST) strip Dispense item covered by patient ins. E11.65 NIDDM type II, uncontrolled - Test 8 times/day, Reason: Hypoglycemia     Blood-Glucose Meter (ACCU-CHEK NESS PLUS METER) Dispense glucose meter, test strips and lancets covered by the patient insurance. Test 4 times per day.     cholecalciferol (VITAMIN D-3) 2,000 unit capsule Take 1 capsule by mouth once daily.     clozapine (CLOZARIL) 100 mg tablet TAKE 200MG (2 tabs) BY MOUTH EVERY NIGHT AT BEDTIME     codeine-guaifenesin (ROBITUSSIN AC) ( mg in 5 mL) Take 10 mL by mouth every 4 hours if needed.     DAILY-PÉREZ tablet TAKE 1 TABLET BY MOUTH DAILY     dextromethorphan-guaifenesin (SILTUSSIN DM MINOR)  mg/5 mL liquid 5mls po q HS prn cough . For 1 week needs appt.if cough continues     diphenhyd-PE-acetaminophen (ADULT ROBITUSSIN NIGHT M-S CLD) 6.25-2.5-160  "mg/5 mL liqd Take  by mouth.     docusate (COLACE) 100 mg capsule Take 100 mg by mouth 2 times daily. Hold for loose stools     durable medical equipment (DME) DEPENDS size MED using 8/DAY [employer] [phone]; patient requesting briefs     EASY TOUCH 31 gauge x 5/16\" USE FOUR TIMES A DAY     Epinephrine (EPIPEN) 0.3 mg/0.3 mL injection INJECT 0.3MG INTRAMUSCULARLY ONE TIME AS NEEDED FOR ALLERGIC REACTION     escitalopram oxalate (LEXAPRO) 10 mg tablet Take 1 tablet by mouth once daily. Along with 5mg tab (TDD=15mg)     escitalopram oxalate (LEXAPRO) 5 mg tablet Take 1 tablet by mouth once daily. Along with 10mg tab (TDD=15mg)     famotidine (PEPCID) 40 mg tablet Take 40 mg by mouth 2 times daily.     flash glucose sensor (Spinal Integration ANDREA 14 DAY SENSOR) kit As directed. use to monitor glucose continuously     fluticasone (50 mcg per actuation) nasal solution (FLONASE) INHALE 1 SPRAY IN THE NOSTRIL(S) ONCE DAILY.     fluticasone-salmeterol (ADVAIR DISKUS) 500-50 mcg/Dose diskus inhaler Inhale 1 Puff by mouth every 12 hours.     gabapentin (NEURONTIN) 100 mg capsule 1 cap by mouth morning and lunch; 3 caps at bedtime     glucagon (GLUCAGON EMERGENCY KIT, HUMAN,) 1 mg kit 1 mg one time if needed for Blood Gluc < Specify (for blood sugar less than 60) for up to 1 dose.     glucose 4 gram chewable tablet Take 4 tablets by mouth each time if needed for Blood Gluc < Specify (glucose < 70).     humidifiers For home use.     ibuprofen (ADVIL; MOTRIN) 600 mg tablet Take 600 mg by mouth every 8 hours if needed for Pain. Maximum of 3200 mg in 24 hours.     insulin aspart U-100 (NOVOLOG FLEXPEN U-100 INSULIN) 100 unit/mL (3 mL) solution for injection Inject 4 units at meals, plus sliding scale; (present glucose - 150)/50. TDD = 40 units     Insulin Needles, Disposable, (PEN NEEDLE) 32 gauge x 5/32\" Use five daily     LANTUS SOLOSTAR U-100 INSULIN 100 unit/mL (3 mL) pen 5 Units before bedtime.     lisinopril " (PRINIVIL; ZESTRIL) 10 mg tablet TAKE 1 TABLET BY MOUTH DAILY     loperamide (IMODIUM) 2 mg capsule Take 4mg by mouth with 1st loose stool, then 2mg with each subsequent loose stool. Max 16 mg in 24 hrs     loratadine (CLARITIN) 10 mg tablet TAKE 1 TABLET BY MOUTH DAILY     Lorazepam (ATIVAN) 0.5 mg tab TAKE 1 TO 2 TABLETS BY MOUTH EVERY 8 HOURS AS NEEDED FOR ACUTE ANXIETY/INSOMNIA     lubiprostone (AMITIZA) 24 mcg capsule Take 24 mcg by mouth 2 times daily with meals. Hold for loose stool     MI-ACID TAKE 15 ML'S BY MOUTH FOUR TIMES A DAY AS NEEDED     milk of magnesia (MILK OF MAGNESIA) 400 mg/5 mL suspension Take 30 mL by mouth once daily.     MILK OF MAGNESIA 400 mg/5 mL suspension TAKE 2 TABLESPOONS (30 ML'S) BY MOUTH TWICE A DAY AS NEEDED     mometasone (NASONEX) (50 mcg each actuation) nasal spray Inhale 2 Sprays in the nostril(s) once daily.     nicotine 21 mg/24 hr (NICODERM; HABITROL) 21 mg/24 hr patch APPLY 1 PATCH ON DRY, CLEAN, HAIRLESS SKIN ONCE DAILY     polyethylene glycoL (MIRALAX) 17 gram/dose powder Take 17 g by mouth 2 times daily. Dissolve in 8oz of liquid. Hold for loose stool     pravastatin (PRAVACHOL) 40 mg tablet TAKE 1 TABLET BY MOUTH EVERY NIGHT AT BEDTIME     ramelteon (ROZEREM) 8 mg tablet Take 1 tablet by mouth at bedtime.     SF 1.1 % gel Apply thin ribbon to teeth with toothbrush for at least one minute     Sumatriptan (IMITREX) 100 mg tablet 1 tablet 2 times daily if needed for Migraine. Give at minimum 2hrs apart. Max Dose: 200mg per 24hrs.     SYMBICORT 160-4.5 mcg/actuation (160-4.5 mcg each actuation) inhaler INHALE 2 PUFFS BY MOUTH TWICE A DAY     topiramate (TOPAMAX) 200 mg tablet Take 1 tablet by mouth 2 times daily.     tramadol (ULTRAM) 50 mg tablet Take 1 tablet by mouth every 6 hours if needed for Pain.     trimethobenzamide (TIGAN) 300 mg capsule Take 300 mg by mouth every 8 hours if needed for Nausea/Vomiting.     Underpad's pads Please dispense 36 Chux uderpads dx:  R35.0     white petrolatum-mineral oil (EUCERIN) cream Apply  topically to affected area(s) once daily if needed for Dry Skin.     white petrolatum-mineral oil (EUCERIN) cream Apply  topically to affected area(s) once daily if needed for Dry Skin.     No current facility-administered medications for this visit.      Medications have been reviewed by me and are current to the best of my knowledge and ability.     Allergies   Allergen Reactions     Bee Sting Kit Anaphylaxis     Hymenoptera Allergenic Extract Anaphylaxis     Splenda [Sucralose] Anaphylaxis     Vistaril [Hydroxyzine] Anaphylaxis, Hives and Dyspnea     Dyspnea     Glyburide Hypoglycemia     Latex Shortness Of Breath     Onions [Onion] Hives     green, red, yellow, mushrooms, garlic---all cause patient to have hives     Paper Tape [Adhesive Tape] Rash     Almonds [Lisbon Oil] Rash     Aspirin Bleeding and Other - Describe In Comment Field     Cephalosporins Rash and Other - Describe In Comment Field     Chlorpromazine Hcl Rash     Chlorpropamide Rash     Coconut Oil Rash     Codeine Other - Describe In Comment Field     migraines     Darvocet [Propoxyphene-Acetaminophen] Rash     Demerol [Meperidine] Rash     Diazepam Rash     Parasomnia- reported waking up in bathtub      Dilaudid [Hydromorphone] Seizures     Dipyridamole Rash     Fentanyl Rash     ,      Fluoxetine Rash     Furosemide Rash     Garlic Hives     Green Pepper      Green and red peppers     Haloperidol Rash     Hydrocodone Hives     Iodinated Contrast- Oral And Iv Dye Itching and Other - Describe In Comment Field     IVP Dye [Diatrizoate Meglumine (Iv Contrast Dye)] Hives and Edema     Lidocaine Hives     From the Lidoderm patch  Tolerates injectable lidocaine and bupivacaine     Lithium Rash and Other - Describe In Comment Field     Mellaril [Thioridazine]      Ondansetron Rash     Other [Unlisted Allergen (Include Detail In Comments)] Rash     Surgical staples, Sweet N Low     Oxycontin  [Oxycodone] Hives     Penicillins Rash and Other - Describe In Comment Field     Procaine Hives     Tolerates bupivacaine and lidocaine     Sea Food [Shellfish Containing Products]      Sulfa (Sulfonamide Antibiotics) Rash     Thallium-201 Rash     Thioridazine Hcl Rash     Thorazine [Chlorpromazine]      Tizanidine Rash     Toradol [Ketorolac] Rash     Propoxyphene N-Acetaminophen Rash     Past Surgical History:   Procedure Laterality Date     Abdominal wall wound exploration  3/2/2015    DR WOODS     APPENDECTOMY       CABG       CARPAL TUNNEL RELEASE  1980's    bilat.     CHOLECYSTECTOMY       COLONOSCOPY N/A 09/20/2018     ESOPHAGOGASTRODUODENOSCOPY  6/15/2007          HX LEFT KNEE ARTHROSCOPY  1981    Arthroscopy, Left Knee     HX TOTAL ABDOMINAL HYSTERECTOMY & BSO  ~39 y/o (or 41 y/o, patient can't remember)    IGLESIA-BSO after birth of 5th child, weighed >12 lbs, severe trauma and severe bladder damage.     HYSTERECTOMY       INSERTION GASTRIC NEURO STIMULATOR  9/23/13     LEFT CUBITAL TUNNEL RELEASE, LEFT DORSAL GANGLION CYST EXCISION  10/26/2016     LEFT REVISION RELEASE CARPAL TUNNEL WITH HYPOTHENAR FAT PAD Left 5/10/16     PLACEMENT GASTRIC NERVE STIMULATOR  5/5/2015    Dr Woods     AK PERQ IMPLANT NEUROELEC [facility]  3/2009    Implantable incontinence device placed by Dr. Muñiz; per Radiology, could have MRI brain if needed.     REMOVAL GASTRIC STIMULATOR  3/4/2015     REPOSITION OF GASTRIC PACERAND INTRA-OPERATIVE CULTURES  12/19/2014     REVISION GASTRIC STIMULATOR  1/27/15    Dr. Woods     RIGHT 1ST DORSAL COMPARTMENT RELEASE (Right Hand) Right 08/06/2018    Dr. Haq     RIGHT CUBITAL TUNNEL RELASE WITH TRANSPOSITION LATEX ALLERGY Right 05/09/2018     RIGHT EXTENSOR CARPI ULNARIS TENDON RELEASE   11/28/2018     RIGHT TEMPORAL ARTERY BIOPSY  7/6/2015     SACRAL LEAD REVISION  9/24/2012     Social History     Tobacco Use     Smoking status: Former Smoker     Packs/day: 2.00     Years: 31.00     " Pack years: 62.00     Types: Cigarettes     Start date: 1972     Last attempt to quit: 2018     Years since quittin.9     Smokeless tobacco: Never Used     Tobacco comment: down to 3 cigarettes daily, on nicotine patch 2018   Substance Use Topics     Alcohol use: No     Alcohol/week: 0.0 oz     Comment: Sober since .      Drug use: No     Comment: H/o IV heroine abuse. +UDS for meth 2013     Family History   Problem Relation Age of Onset     Alcohol/Drug Mother      Cancer-breast Mother      Alcohol/Drug Father      Other Other         Patient abandoned by parents at 9 y/o and she is unaware of family history     Cancer-breast Maternal Grandmother      Cancer-breast Maternal Aunt      Cancer-ovarian No Family History        PAST DIFFICULTY WITH ANESTHESIA: None     Physical Exam   /63 (Cuff Site: Left Arm, Position: Sitting, Cuff Size: Adult Regular)   Pulse 89   Ht 1.549 m (5' 1\")   Wt 69.4 kg (153 lb)   LMP  (LMP Unknown)   SpO2 94%   BMI 28.91 kg/m        Well developed/well nourished, no distress.    HEENT: Normocephalic, atraumatic.  PERRLA, EOMI.  clear conjunctiva.  External ear canals wnl b/l.  TM'S: unremarkable with + light reflex.  Nose: No d/c.  Nl nasal mucosa. No sinus tenderness. Oropharynx: Nl mouth/lips.  No erythema/No exudate.    Neck: supple.  No lymphadenopathy.  Nl thyroid without enlargement or nodules.  No neck masses.  No carotid bruits.   Lungs:CTA B. no wheeze/rhonchi/rales.    Heart: RRR, Nl S1 and S2,  no M/G/R.  ABD: Soft, non-tender, non-distended, normoactive bowel sounds, no masses/organomegaly  Ext/musculoskeletal: Grossly intact.  FROM of all joints.  no cyanosis/calf tenderness/edema.  pedal pulses 2 + and = bilaterally. Good cap refill.   Buttock: At site of current sacral stimulator incision healing well , not tender at incision site but did complain that is was sensitive to touch much less tender than in past  Neuro: aaox3, cn 2-12 intact, " weakness orf  strength right hand and 4/5 strength in all muscle groups bilateral lower extremities unsteady gait no tremors.  Skin: no rash      Labs Done: CBC has another in chart form 4 weeks ago , also has A1c and CMP in chart in last month  ECG: Not indicated         Assessment:  1) Synovial cyst L4-5 right side  2) Preoperative exam  2) Diabetes Mellitus followed by endocrinology patient has spoken with Dr. Huffman and is to hold her insulin the night prior to surgery and her morning dose  3) Moderate persistent asthma now stable may need increased inhalers post operatively and at least Q 8 hours with incentive spirometry  4) Schizoaffective disorder stable  5) Hypertension at goal  6) Chronic stable hyponatremia  7) Tobacco abuse has stopped smoking for 3 weeks  8) Anxiety due to housing situation  9) History of seizure disorder       Plan:  1) To use her inhaler four times a day for 3 days prior to surgery  2) Take medications as she has been prior to surgery  3) Labs done: CBC,   4 Patient is cleared for planned procedure. No further diagnostic or therapeutic steps needed prior to surgery    Kei De Leon MD    5/21/2024, 10:50 AM

## 2024-05-23 ENCOUNTER — ANESTHESIA (OUTPATIENT)
Dept: SURGERY | Facility: CLINIC | Age: 64
End: 2024-05-23
Payer: MEDICAID

## 2024-05-23 ENCOUNTER — ANESTHESIA EVENT (OUTPATIENT)
Dept: SURGERY | Facility: CLINIC | Age: 64
End: 2024-05-23
Payer: MEDICAID

## 2024-05-23 ENCOUNTER — HOSPITAL ENCOUNTER (INPATIENT)
Facility: CLINIC | Age: 64
LOS: 1 days | Discharge: LEFT BEFORE DISCHARGE | End: 2024-05-25
Attending: ORTHOPAEDIC SURGERY | Admitting: ORTHOPAEDIC SURGERY
Payer: MEDICAID

## 2024-05-23 ENCOUNTER — APPOINTMENT (OUTPATIENT)
Dept: RADIOLOGY | Facility: CLINIC | Age: 64
End: 2024-05-23
Attending: ORTHOPAEDIC SURGERY
Payer: MEDICAID

## 2024-05-23 DIAGNOSIS — M48.061 DEGENERATIVE LUMBAR SPINAL STENOSIS: ICD-10-CM

## 2024-05-23 DIAGNOSIS — M48.062 SPINAL STENOSIS OF LUMBAR REGION WITH NEUROGENIC CLAUDICATION: Primary | ICD-10-CM

## 2024-05-23 DIAGNOSIS — J44.1 COPD EXACERBATION (H): ICD-10-CM

## 2024-05-23 LAB
ANION GAP SERPL CALCULATED.3IONS-SCNC: 7 MMOL/L (ref 7–15)
BASE EXCESS BLDA CALC-SCNC: NORMAL MMOL/L
BASE EXCESS BLDV CALC-SCNC: -1.3 MMOL/L (ref -3–3)
BASE EXCESS BLDV CALC-SCNC: -2 MMOL/L (ref -3–3)
BUN SERPL-MCNC: 10 MG/DL (ref 8–23)
CALCIUM SERPL-MCNC: 9.6 MG/DL (ref 8.8–10.2)
CHLORIDE SERPL-SCNC: 107 MMOL/L (ref 98–107)
COHGB MFR BLD: NORMAL %
CREAT SERPL-MCNC: 0.56 MG/DL (ref 0.51–0.95)
DEPRECATED HCO3 PLAS-SCNC: 22 MMOL/L (ref 22–29)
EGFRCR SERPLBLD CKD-EPI 2021: >90 ML/MIN/1.73M2
ERYTHROCYTE [DISTWIDTH] IN BLOOD BY AUTOMATED COUNT: 14.2 % (ref 10–15)
ERYTHROCYTE [DISTWIDTH] IN BLOOD BY AUTOMATED COUNT: 14.2 % (ref 10–15)
GLUCOSE BLDC GLUCOMTR-MCNC: 125 MG/DL (ref 70–99)
GLUCOSE BLDC GLUCOMTR-MCNC: 134 MG/DL (ref 70–99)
GLUCOSE BLDC GLUCOMTR-MCNC: 142 MG/DL (ref 70–99)
GLUCOSE BLDC GLUCOMTR-MCNC: 179 MG/DL (ref 70–99)
GLUCOSE BLDC GLUCOMTR-MCNC: 190 MG/DL (ref 70–99)
GLUCOSE BLDC GLUCOMTR-MCNC: 68 MG/DL (ref 70–99)
GLUCOSE SERPL-MCNC: 134 MG/DL (ref 70–99)
HCO3 BLD-SCNC: NORMAL MMOL/L
HCO3 BLDV-SCNC: 26 MMOL/L (ref 21–28)
HCO3 BLDV-SCNC: 26 MMOL/L (ref 21–28)
HCT VFR BLD AUTO: 44.5 % (ref 35–47)
HCT VFR BLD AUTO: 44.5 % (ref 35–47)
HGB BLD-MCNC: 14.9 G/DL (ref 11.7–15.7)
HGB BLD-MCNC: 14.9 G/DL (ref 11.7–15.7)
LACTATE SERPL-SCNC: 0.8 MMOL/L (ref 0.7–2)
MAGNESIUM SERPL-MCNC: 2.6 MG/DL (ref 1.7–2.3)
MCH RBC QN AUTO: 32.3 PG (ref 26.5–33)
MCH RBC QN AUTO: 32.3 PG (ref 26.5–33)
MCHC RBC AUTO-ENTMCNC: 33.5 G/DL (ref 31.5–36.5)
MCHC RBC AUTO-ENTMCNC: 33.5 G/DL (ref 31.5–36.5)
MCV RBC AUTO: 96 FL (ref 78–100)
MCV RBC AUTO: 96 FL (ref 78–100)
O2/TOTAL GAS SETTING VFR VENT: 35 %
O2/TOTAL GAS SETTING VFR VENT: 35 %
O2/TOTAL GAS SETTING VFR VENT: 36 %
OXYHGB MFR BLDV: 77 % (ref 70–75)
OXYHGB MFR BLDV: 81 % (ref 70–75)
PCO2 BLD: NORMAL MM[HG]
PCO2 BLDV: 58 MM HG (ref 40–50)
PCO2 BLDV: 63 MM HG (ref 40–50)
PEEP: 0 CM H2O
PH BLD: NORMAL [PH]
PH BLDV: 7.22 [PH] (ref 7.32–7.43)
PH BLDV: 7.26 [PH] (ref 7.32–7.43)
PLATELET # BLD AUTO: 204 10E3/UL (ref 150–450)
PLATELET # BLD AUTO: 204 10E3/UL (ref 150–450)
PO2 BLD: NORMAL MM[HG]
PO2 BLDV: 45 MM HG (ref 25–47)
PO2 BLDV: 49 MM HG (ref 25–47)
POTASSIUM SERPL-SCNC: 4.3 MMOL/L (ref 3.4–5.3)
RBC # BLD AUTO: 4.62 10E6/UL (ref 3.8–5.2)
RBC # BLD AUTO: 4.62 10E6/UL (ref 3.8–5.2)
SAO2 % BLDA: NORMAL %
SAO2 % BLDV: 79.7 % (ref 70–75)
SAO2 % BLDV: 83.5 % (ref 70–75)
SODIUM SERPL-SCNC: 136 MMOL/L (ref 135–145)
WBC # BLD AUTO: 15.5 10E3/UL (ref 4–11)
WBC # BLD AUTO: 15.5 10E3/UL (ref 4–11)

## 2024-05-23 PROCEDURE — 80048 BASIC METABOLIC PNL TOTAL CA: CPT | Performed by: PHYSICIAN ASSISTANT

## 2024-05-23 PROCEDURE — 370N000017 HC ANESTHESIA TECHNICAL FEE, PER MIN: Performed by: ORTHOPAEDIC SURGERY

## 2024-05-23 PROCEDURE — 250N000009 HC RX 250: Performed by: ORTHOPAEDIC SURGERY

## 2024-05-23 PROCEDURE — 82805 BLOOD GASES W/O2 SATURATION: CPT | Performed by: STUDENT IN AN ORGANIZED HEALTH CARE EDUCATION/TRAINING PROGRAM

## 2024-05-23 PROCEDURE — 36600 WITHDRAWAL OF ARTERIAL BLOOD: CPT

## 2024-05-23 PROCEDURE — 36415 COLL VENOUS BLD VENIPUNCTURE: CPT | Performed by: STUDENT IN AN ORGANIZED HEALTH CARE EDUCATION/TRAINING PROGRAM

## 2024-05-23 PROCEDURE — 999N000141 HC STATISTIC PRE-PROCEDURE NURSING ASSESSMENT: Performed by: ORTHOPAEDIC SURGERY

## 2024-05-23 PROCEDURE — 999N000180 XR SURGERY CARM FLUORO LESS THAN 5 MIN: Mod: TC

## 2024-05-23 PROCEDURE — 250N000025 HC SEVOFLURANE, PER MIN: Performed by: ORTHOPAEDIC SURGERY

## 2024-05-23 PROCEDURE — 272N000452 HC KIT SHRLOCK 5FR POWER PICC TRIPLE LUMEN

## 2024-05-23 PROCEDURE — 94660 CPAP INITIATION&MGMT: CPT

## 2024-05-23 PROCEDURE — 250N000011 HC RX IP 250 OP 636: Performed by: STUDENT IN AN ORGANIZED HEALTH CARE EDUCATION/TRAINING PROGRAM

## 2024-05-23 PROCEDURE — 99254 IP/OBS CNSLTJ NEW/EST MOD 60: CPT | Performed by: NURSE PRACTITIONER

## 2024-05-23 PROCEDURE — 01NB0ZZ RELEASE LUMBAR NERVE, OPEN APPROACH: ICD-10-PCS | Performed by: ORTHOPAEDIC SURGERY

## 2024-05-23 PROCEDURE — 83735 ASSAY OF MAGNESIUM: CPT | Performed by: STUDENT IN AN ORGANIZED HEALTH CARE EDUCATION/TRAINING PROGRAM

## 2024-05-23 PROCEDURE — 250N000009 HC RX 250: Performed by: STUDENT IN AN ORGANIZED HEALTH CARE EDUCATION/TRAINING PROGRAM

## 2024-05-23 PROCEDURE — 250N000011 HC RX IP 250 OP 636: Performed by: NURSE ANESTHETIST, CERTIFIED REGISTERED

## 2024-05-23 PROCEDURE — 258N000001 HC RX 258: Performed by: ORTHOPAEDIC SURGERY

## 2024-05-23 PROCEDURE — 250N000013 HC RX MED GY IP 250 OP 250 PS 637: Performed by: ORTHOPAEDIC SURGERY

## 2024-05-23 PROCEDURE — 82805 BLOOD GASES W/O2 SATURATION: CPT | Performed by: ANESTHESIOLOGY

## 2024-05-23 PROCEDURE — 999N000157 HC STATISTIC RCP TIME EA 10 MIN

## 2024-05-23 PROCEDURE — 258N000003 HC RX IP 258 OP 636: Performed by: STUDENT IN AN ORGANIZED HEALTH CARE EDUCATION/TRAINING PROGRAM

## 2024-05-23 PROCEDURE — 250N000009 HC RX 250: Performed by: NURSE ANESTHETIST, CERTIFIED REGISTERED

## 2024-05-23 PROCEDURE — 82962 GLUCOSE BLOOD TEST: CPT

## 2024-05-23 PROCEDURE — 258N000003 HC RX IP 258 OP 636: Performed by: NURSE ANESTHETIST, CERTIFIED REGISTERED

## 2024-05-23 PROCEDURE — 94640 AIRWAY INHALATION TREATMENT: CPT

## 2024-05-23 PROCEDURE — 360N000084 HC SURGERY LEVEL 4 W/ FLUORO, PER MIN: Performed by: ORTHOPAEDIC SURGERY

## 2024-05-23 PROCEDURE — 250N000011 HC RX IP 250 OP 636: Mod: JZ | Performed by: PHYSICIAN ASSISTANT

## 2024-05-23 PROCEDURE — 710N000010 HC RECOVERY PHASE 1, LEVEL 2, PER MIN: Performed by: ORTHOPAEDIC SURGERY

## 2024-05-23 PROCEDURE — 5A09357 ASSISTANCE WITH RESPIRATORY VENTILATION, LESS THAN 24 CONSECUTIVE HOURS, CONTINUOUS POSITIVE AIRWAY PRESSURE: ICD-10-PCS | Performed by: INTERNAL MEDICINE

## 2024-05-23 PROCEDURE — 250N000011 HC RX IP 250 OP 636: Performed by: ORTHOPAEDIC SURGERY

## 2024-05-23 PROCEDURE — 85025 COMPLETE CBC W/AUTO DIFF WBC: CPT | Performed by: STUDENT IN AN ORGANIZED HEALTH CARE EDUCATION/TRAINING PROGRAM

## 2024-05-23 PROCEDURE — 272N000001 HC OR GENERAL SUPPLY STERILE: Performed by: ORTHOPAEDIC SURGERY

## 2024-05-23 PROCEDURE — 36415 COLL VENOUS BLD VENIPUNCTURE: CPT | Performed by: PHYSICIAN ASSISTANT

## 2024-05-23 PROCEDURE — 36569 INSJ PICC 5 YR+ W/O IMAGING: CPT

## 2024-05-23 PROCEDURE — 99254 IP/OBS CNSLTJ NEW/EST MOD 60: CPT | Performed by: STUDENT IN AN ORGANIZED HEALTH CARE EDUCATION/TRAINING PROGRAM

## 2024-05-23 PROCEDURE — 250N000012 HC RX MED GY IP 250 OP 636 PS 637: Performed by: STUDENT IN AN ORGANIZED HEALTH CARE EDUCATION/TRAINING PROGRAM

## 2024-05-23 PROCEDURE — 272N000004 HC RX 272: Performed by: ORTHOPAEDIC SURGERY

## 2024-05-23 PROCEDURE — 83605 ASSAY OF LACTIC ACID: CPT | Performed by: STUDENT IN AN ORGANIZED HEALTH CARE EDUCATION/TRAINING PROGRAM

## 2024-05-23 RX ORDER — NALOXONE HYDROCHLORIDE 0.4 MG/ML
0.4 INJECTION, SOLUTION INTRAMUSCULAR; INTRAVENOUS; SUBCUTANEOUS
Status: DISCONTINUED | OUTPATIENT
Start: 2024-05-23 | End: 2024-05-25 | Stop reason: HOSPADM

## 2024-05-23 RX ORDER — TOPIRAMATE 100 MG/1
200 TABLET, FILM COATED ORAL 2 TIMES DAILY
Status: DISCONTINUED | OUTPATIENT
Start: 2024-05-23 | End: 2024-05-25 | Stop reason: HOSPADM

## 2024-05-23 RX ORDER — ATROPINE SULFATE 10 MG/ML
1-2 SOLUTION/ DROPS OPHTHALMIC 2 TIMES DAILY PRN
Status: DISCONTINUED | OUTPATIENT
Start: 2024-05-23 | End: 2024-05-25 | Stop reason: HOSPADM

## 2024-05-23 RX ORDER — POLYETHYLENE GLYCOL 3350 17 G/17G
17 POWDER, FOR SOLUTION ORAL 2 TIMES DAILY
Status: DISCONTINUED | OUTPATIENT
Start: 2024-05-23 | End: 2024-05-25 | Stop reason: HOSPADM

## 2024-05-23 RX ORDER — ACETAMINOPHEN 325 MG/1
975 TABLET ORAL EVERY 8 HOURS
Status: DISCONTINUED | OUTPATIENT
Start: 2024-05-23 | End: 2024-05-25 | Stop reason: HOSPADM

## 2024-05-23 RX ORDER — FLUTICASONE PROPIONATE 50 MCG
1 SPRAY, SUSPENSION (ML) NASAL DAILY
COMMUNITY
End: 2024-09-13

## 2024-05-23 RX ORDER — IPRATROPIUM BROMIDE AND ALBUTEROL SULFATE 2.5; .5 MG/3ML; MG/3ML
3 SOLUTION RESPIRATORY (INHALATION) ONCE
Status: DISCONTINUED | OUTPATIENT
Start: 2024-05-23 | End: 2024-05-23

## 2024-05-23 RX ORDER — FLUTICASONE FUROATE AND VILANTEROL 200; 25 UG/1; UG/1
1 POWDER RESPIRATORY (INHALATION) DAILY
Status: DISCONTINUED | OUTPATIENT
Start: 2024-05-24 | End: 2024-05-25 | Stop reason: HOSPADM

## 2024-05-23 RX ORDER — FENTANYL CITRATE 50 UG/ML
50 INJECTION, SOLUTION INTRAMUSCULAR; INTRAVENOUS EVERY 5 MIN PRN
Status: DISCONTINUED | OUTPATIENT
Start: 2024-05-23 | End: 2024-05-23 | Stop reason: HOSPADM

## 2024-05-23 RX ORDER — LUBIPROSTONE 8 UG/1
24 CAPSULE ORAL 2 TIMES DAILY
Status: DISCONTINUED | OUTPATIENT
Start: 2024-05-23 | End: 2024-05-25 | Stop reason: HOSPADM

## 2024-05-23 RX ORDER — RAMELTEON 8 MG/1
8 TABLET ORAL AT BEDTIME
Status: CANCELLED | OUTPATIENT
Start: 2024-05-23

## 2024-05-23 RX ORDER — DEXAMETHASONE SODIUM PHOSPHATE 4 MG/ML
4 INJECTION, SOLUTION INTRA-ARTICULAR; INTRALESIONAL; INTRAMUSCULAR; INTRAVENOUS; SOFT TISSUE
Status: DISCONTINUED | OUTPATIENT
Start: 2024-05-23 | End: 2024-05-23 | Stop reason: HOSPADM

## 2024-05-23 RX ORDER — EPHEDRINE SULFATE 50 MG/ML
INJECTION, SOLUTION INTRAMUSCULAR; INTRAVENOUS; SUBCUTANEOUS PRN
Status: DISCONTINUED | OUTPATIENT
Start: 2024-05-23 | End: 2024-05-23

## 2024-05-23 RX ORDER — ACETAMINOPHEN 325 MG/1
650 TABLET ORAL EVERY 4 HOURS PRN
Status: DISCONTINUED | OUTPATIENT
Start: 2024-05-26 | End: 2024-05-25 | Stop reason: HOSPADM

## 2024-05-23 RX ORDER — IPRATROPIUM BROMIDE AND ALBUTEROL SULFATE 2.5; .5 MG/3ML; MG/3ML
3 SOLUTION RESPIRATORY (INHALATION) ONCE
Status: COMPLETED | OUTPATIENT
Start: 2024-05-23 | End: 2024-05-23

## 2024-05-23 RX ORDER — AMOXICILLIN 250 MG
1 CAPSULE ORAL 2 TIMES DAILY
Status: DISCONTINUED | OUTPATIENT
Start: 2024-05-23 | End: 2024-05-25 | Stop reason: HOSPADM

## 2024-05-23 RX ORDER — CLINDAMYCIN PHOSPHATE 900 MG/50ML
900 INJECTION, SOLUTION INTRAVENOUS
Status: DISCONTINUED | OUTPATIENT
Start: 2024-05-23 | End: 2024-05-23 | Stop reason: HOSPADM

## 2024-05-23 RX ORDER — OXYCODONE HYDROCHLORIDE 5 MG/1
5 TABLET ORAL
Status: DISCONTINUED | OUTPATIENT
Start: 2024-05-23 | End: 2024-05-23 | Stop reason: HOSPADM

## 2024-05-23 RX ORDER — SODIUM CHLORIDE, SODIUM LACTATE, POTASSIUM CHLORIDE, CALCIUM CHLORIDE 600; 310; 30; 20 MG/100ML; MG/100ML; MG/100ML; MG/100ML
INJECTION, SOLUTION INTRAVENOUS CONTINUOUS
Status: DISCONTINUED | OUTPATIENT
Start: 2024-05-23 | End: 2024-05-23 | Stop reason: HOSPADM

## 2024-05-23 RX ORDER — INSULIN ASPART 100 [IU]/ML
15 INJECTION, SOLUTION INTRAVENOUS; SUBCUTANEOUS
COMMUNITY
End: 2024-09-13

## 2024-05-23 RX ORDER — PRAVASTATIN SODIUM 20 MG
40 TABLET ORAL EVERY MORNING
Status: DISCONTINUED | OUTPATIENT
Start: 2024-05-24 | End: 2024-05-25 | Stop reason: HOSPADM

## 2024-05-23 RX ORDER — SODIUM CHLORIDE, SODIUM LACTATE, POTASSIUM CHLORIDE, CALCIUM CHLORIDE 600; 310; 30; 20 MG/100ML; MG/100ML; MG/100ML; MG/100ML
INJECTION, SOLUTION INTRAVENOUS CONTINUOUS
Status: DISCONTINUED | OUTPATIENT
Start: 2024-05-23 | End: 2024-05-24

## 2024-05-23 RX ORDER — ALUMINA, MAGNESIA, AND SIMETHICONE 2400; 2400; 240 MG/30ML; MG/30ML; MG/30ML
5 SUSPENSION ORAL EVERY 4 HOURS PRN
Status: DISCONTINUED | OUTPATIENT
Start: 2024-05-23 | End: 2024-05-25 | Stop reason: HOSPADM

## 2024-05-23 RX ORDER — NALOXONE HYDROCHLORIDE 0.4 MG/ML
0.1 INJECTION, SOLUTION INTRAMUSCULAR; INTRAVENOUS; SUBCUTANEOUS
Status: DISCONTINUED | OUTPATIENT
Start: 2024-05-23 | End: 2024-05-23 | Stop reason: HOSPADM

## 2024-05-23 RX ORDER — LISINOPRIL 10 MG/1
10 TABLET ORAL DAILY
Status: DISCONTINUED | OUTPATIENT
Start: 2024-05-24 | End: 2024-05-25 | Stop reason: HOSPADM

## 2024-05-23 RX ORDER — ALBUTEROL SULFATE 0.83 MG/ML
2.5 SOLUTION RESPIRATORY (INHALATION) ONCE
Status: DISCONTINUED | OUTPATIENT
Start: 2024-05-23 | End: 2024-05-23 | Stop reason: HOSPADM

## 2024-05-23 RX ORDER — GABAPENTIN 100 MG/1
100 CAPSULE ORAL
Status: DISCONTINUED | OUTPATIENT
Start: 2024-05-24 | End: 2024-05-24

## 2024-05-23 RX ORDER — LIDOCAINE 40 MG/G
CREAM TOPICAL
Status: DISCONTINUED | OUTPATIENT
Start: 2024-05-23 | End: 2024-05-23 | Stop reason: HOSPADM

## 2024-05-23 RX ORDER — MORPHINE SULFATE 4 MG/ML
2 INJECTION, SOLUTION INTRAMUSCULAR; INTRAVENOUS
Status: DISCONTINUED | OUTPATIENT
Start: 2024-05-23 | End: 2024-05-24

## 2024-05-23 RX ORDER — BUPIVACAINE HYDROCHLORIDE AND EPINEPHRINE 2.5; 5 MG/ML; UG/ML
INJECTION, SOLUTION INFILTRATION; PERINEURAL PRN
Status: DISCONTINUED | OUTPATIENT
Start: 2024-05-23 | End: 2024-05-23 | Stop reason: HOSPADM

## 2024-05-23 RX ORDER — TRAMADOL HYDROCHLORIDE 50 MG/1
50 TABLET ORAL EVERY 6 HOURS PRN
COMMUNITY

## 2024-05-23 RX ORDER — NALOXONE HYDROCHLORIDE 0.4 MG/ML
0.2 INJECTION, SOLUTION INTRAMUSCULAR; INTRAVENOUS; SUBCUTANEOUS
Status: DISCONTINUED | OUTPATIENT
Start: 2024-05-23 | End: 2024-05-25 | Stop reason: HOSPADM

## 2024-05-23 RX ORDER — ONDANSETRON 4 MG/1
4 TABLET, ORALLY DISINTEGRATING ORAL EVERY 30 MIN PRN
Status: DISCONTINUED | OUTPATIENT
Start: 2024-05-23 | End: 2024-05-23 | Stop reason: HOSPADM

## 2024-05-23 RX ORDER — DEXAMETHASONE SODIUM PHOSPHATE 10 MG/ML
INJECTION, SOLUTION INTRAMUSCULAR; INTRAVENOUS PRN
Status: DISCONTINUED | OUTPATIENT
Start: 2024-05-23 | End: 2024-05-23

## 2024-05-23 RX ORDER — GABAPENTIN 300 MG/1
300 CAPSULE ORAL
Status: COMPLETED | OUTPATIENT
Start: 2024-05-23 | End: 2024-05-23

## 2024-05-23 RX ORDER — FENTANYL CITRATE 50 UG/ML
25 INJECTION, SOLUTION INTRAMUSCULAR; INTRAVENOUS EVERY 4 HOURS PRN
Status: DISCONTINUED | OUTPATIENT
Start: 2024-05-23 | End: 2024-05-23

## 2024-05-23 RX ORDER — GABAPENTIN 300 MG/1
300 CAPSULE ORAL AT BEDTIME
COMMUNITY
End: 2024-09-13

## 2024-05-23 RX ORDER — BISACODYL 10 MG
10 SUPPOSITORY, RECTAL RECTAL DAILY PRN
Status: DISCONTINUED | OUTPATIENT
Start: 2024-05-26 | End: 2024-05-25 | Stop reason: HOSPADM

## 2024-05-23 RX ORDER — NICOTINE POLACRILEX 4 MG
15-30 LOZENGE BUCCAL
Status: DISCONTINUED | OUTPATIENT
Start: 2024-05-23 | End: 2024-05-25 | Stop reason: HOSPADM

## 2024-05-23 RX ORDER — GABAPENTIN 300 MG/1
300 CAPSULE ORAL AT BEDTIME
Status: DISCONTINUED | OUTPATIENT
Start: 2024-05-23 | End: 2024-05-24

## 2024-05-23 RX ORDER — LORATADINE 10 MG/1
10 TABLET ORAL DAILY
Status: DISCONTINUED | OUTPATIENT
Start: 2024-05-24 | End: 2024-05-25 | Stop reason: HOSPADM

## 2024-05-23 RX ORDER — OXYCODONE HYDROCHLORIDE 5 MG/1
10 TABLET ORAL EVERY 4 HOURS PRN
Status: DISCONTINUED | OUTPATIENT
Start: 2024-05-23 | End: 2024-05-24

## 2024-05-23 RX ORDER — ONDANSETRON 2 MG/ML
4 INJECTION INTRAMUSCULAR; INTRAVENOUS EVERY 30 MIN PRN
Status: DISCONTINUED | OUTPATIENT
Start: 2024-05-23 | End: 2024-05-23 | Stop reason: HOSPADM

## 2024-05-23 RX ORDER — FLUTICASONE PROPIONATE 50 MCG
1 SPRAY, SUSPENSION (ML) NASAL DAILY
Status: DISCONTINUED | OUTPATIENT
Start: 2024-05-24 | End: 2024-05-25 | Stop reason: HOSPADM

## 2024-05-23 RX ORDER — ONDANSETRON 2 MG/ML
INJECTION INTRAMUSCULAR; INTRAVENOUS PRN
Status: DISCONTINUED | OUTPATIENT
Start: 2024-05-23 | End: 2024-05-23

## 2024-05-23 RX ORDER — CLOZAPINE 100 MG/1
200 TABLET ORAL AT BEDTIME
Status: CANCELLED | OUTPATIENT
Start: 2024-05-23

## 2024-05-23 RX ORDER — POLYETHYLENE GLYCOL 3350 17 G/17G
17 POWDER, FOR SOLUTION ORAL DAILY
Status: DISCONTINUED | OUTPATIENT
Start: 2024-05-24 | End: 2024-05-23

## 2024-05-23 RX ORDER — ALBUTEROL SULFATE 90 UG/1
2 AEROSOL, METERED RESPIRATORY (INHALATION) EVERY 6 HOURS PRN
Status: DISCONTINUED | OUTPATIENT
Start: 2024-05-23 | End: 2024-05-25 | Stop reason: HOSPADM

## 2024-05-23 RX ORDER — VASOPRESSIN IN 0.9 % NACL 2 UNIT/2ML
SYRINGE (ML) INTRAVENOUS PRN
Status: DISCONTINUED | OUTPATIENT
Start: 2024-05-23 | End: 2024-05-23

## 2024-05-23 RX ORDER — CLINDAMYCIN PHOSPHATE 900 MG/50ML
900 INJECTION, SOLUTION INTRAVENOUS SEE ADMIN INSTRUCTIONS
Status: DISCONTINUED | OUTPATIENT
Start: 2024-05-23 | End: 2024-05-23 | Stop reason: HOSPADM

## 2024-05-23 RX ORDER — CLOZAPINE 100 MG/1
200 TABLET ORAL AT BEDTIME
Status: DISCONTINUED | OUTPATIENT
Start: 2024-05-23 | End: 2024-05-25 | Stop reason: HOSPADM

## 2024-05-23 RX ORDER — DEXTROSE MONOHYDRATE 25 G/50ML
25-50 INJECTION, SOLUTION INTRAVENOUS
Status: DISCONTINUED | OUTPATIENT
Start: 2024-05-23 | End: 2024-05-25 | Stop reason: HOSPADM

## 2024-05-23 RX ORDER — LIDOCAINE HYDROCHLORIDE 10 MG/ML
INJECTION, SOLUTION INFILTRATION; PERINEURAL PRN
Status: DISCONTINUED | OUTPATIENT
Start: 2024-05-23 | End: 2024-05-23

## 2024-05-23 RX ORDER — DIPHENHYDRAMINE HCL 25 MG
25 CAPSULE ORAL
Status: CANCELLED | OUTPATIENT
Start: 2024-05-23

## 2024-05-23 RX ORDER — OXYCODONE HYDROCHLORIDE 5 MG/1
5 TABLET ORAL EVERY 4 HOURS PRN
Status: DISCONTINUED | OUTPATIENT
Start: 2024-05-23 | End: 2024-05-24

## 2024-05-23 RX ORDER — MAGNESIUM SULFATE 4 G/50ML
4 INJECTION INTRAVENOUS ONCE
Status: COMPLETED | OUTPATIENT
Start: 2024-05-23 | End: 2024-05-23

## 2024-05-23 RX ORDER — CLINDAMYCIN PHOSPHATE 600 MG/50ML
600 INJECTION, SOLUTION INTRAVENOUS EVERY 8 HOURS
Qty: 150 ML | Refills: 0 | Status: COMPLETED | OUTPATIENT
Start: 2024-05-23 | End: 2024-05-24

## 2024-05-23 RX ORDER — MORPHINE SULFATE 4 MG/ML
1 INJECTION, SOLUTION INTRAMUSCULAR; INTRAVENOUS
Status: DISCONTINUED | OUTPATIENT
Start: 2024-05-23 | End: 2024-05-24

## 2024-05-23 RX ORDER — FLUTICASONE PROPIONATE AND SALMETEROL 500; 50 UG/1; UG/1
1 POWDER RESPIRATORY (INHALATION) 2 TIMES DAILY
COMMUNITY
End: 2024-09-13

## 2024-05-23 RX ORDER — SUMATRIPTAN 50 MG/1
100 TABLET, FILM COATED ORAL
Status: DISCONTINUED | OUTPATIENT
Start: 2024-05-23 | End: 2024-05-25 | Stop reason: HOSPADM

## 2024-05-23 RX ORDER — DEXTROSE MONOHYDRATE 25 G/50ML
25 INJECTION, SOLUTION INTRAVENOUS ONCE
Status: COMPLETED | OUTPATIENT
Start: 2024-05-23 | End: 2024-05-23

## 2024-05-23 RX ORDER — GABAPENTIN 100 MG/1
100 CAPSULE ORAL
COMMUNITY
End: 2024-09-13

## 2024-05-23 RX ORDER — PANTOPRAZOLE SODIUM 40 MG/1
40 TABLET, DELAYED RELEASE ORAL DAILY
Status: DISCONTINUED | OUTPATIENT
Start: 2024-05-24 | End: 2024-05-25 | Stop reason: HOSPADM

## 2024-05-23 RX ORDER — NICOTINE 21 MG/24HR
1 PATCH, TRANSDERMAL 24 HOURS TRANSDERMAL DAILY
Status: DISCONTINUED | OUTPATIENT
Start: 2024-05-24 | End: 2024-05-25 | Stop reason: HOSPADM

## 2024-05-23 RX ORDER — FENTANYL CITRATE 50 UG/ML
25 INJECTION, SOLUTION INTRAMUSCULAR; INTRAVENOUS EVERY 5 MIN PRN
Status: DISCONTINUED | OUTPATIENT
Start: 2024-05-23 | End: 2024-05-23 | Stop reason: HOSPADM

## 2024-05-23 RX ORDER — LABETALOL HYDROCHLORIDE 5 MG/ML
10 INJECTION, SOLUTION INTRAVENOUS
Status: DISCONTINUED | OUTPATIENT
Start: 2024-05-23 | End: 2024-05-23 | Stop reason: HOSPADM

## 2024-05-23 RX ORDER — IPRATROPIUM BROMIDE AND ALBUTEROL SULFATE 2.5; .5 MG/3ML; MG/3ML
3 SOLUTION RESPIRATORY (INHALATION)
Status: DISCONTINUED | OUTPATIENT
Start: 2024-05-23 | End: 2024-05-25 | Stop reason: HOSPADM

## 2024-05-23 RX ORDER — PREDNISONE 20 MG/1
40 TABLET ORAL DAILY
Status: DISCONTINUED | OUTPATIENT
Start: 2024-05-24 | End: 2024-05-25 | Stop reason: HOSPADM

## 2024-05-23 RX ORDER — FENTANYL CITRATE 50 UG/ML
INJECTION, SOLUTION INTRAMUSCULAR; INTRAVENOUS PRN
Status: DISCONTINUED | OUTPATIENT
Start: 2024-05-23 | End: 2024-05-23

## 2024-05-23 RX ORDER — PROPOFOL 10 MG/ML
INJECTION, EMULSION INTRAVENOUS PRN
Status: DISCONTINUED | OUTPATIENT
Start: 2024-05-23 | End: 2024-05-23

## 2024-05-23 RX ORDER — FLUTICASONE PROPIONATE AND SALMETEROL 500; 50 UG/1; UG/1
1 POWDER RESPIRATORY (INHALATION) 2 TIMES DAILY
Status: DISCONTINUED | OUTPATIENT
Start: 2024-05-23 | End: 2024-05-25 | Stop reason: HOSPADM

## 2024-05-23 RX ORDER — ACETAMINOPHEN 325 MG/1
975 TABLET ORAL EVERY 8 HOURS
Status: DISCONTINUED | OUTPATIENT
Start: 2024-05-23 | End: 2024-05-23

## 2024-05-23 RX ADMIN — LIDOCAINE HYDROCHLORIDE 5 ML: 10 INJECTION, SOLUTION INFILTRATION; PERINEURAL at 09:56

## 2024-05-23 RX ADMIN — Medication 10 MG: at 10:46

## 2024-05-23 RX ADMIN — SUGAMMADEX 200 MG: 100 INJECTION, SOLUTION INTRAVENOUS at 11:14

## 2024-05-23 RX ADMIN — PROPOFOL 50 MCG/KG/MIN: 10 INJECTION, EMULSION INTRAVENOUS at 09:59

## 2024-05-23 RX ADMIN — GABAPENTIN 300 MG: 300 CAPSULE ORAL at 08:01

## 2024-05-23 RX ADMIN — ROCURONIUM BROMIDE 15 MG: 10 INJECTION, SOLUTION INTRAVENOUS at 10:26

## 2024-05-23 RX ADMIN — MAGNESIUM SULFATE HEPTAHYDRATE 4 G: 4 INJECTION, SOLUTION INTRAVENOUS at 08:38

## 2024-05-23 RX ADMIN — PHENYLEPHRINE HYDROCHLORIDE 100 MCG: 10 INJECTION INTRAVENOUS at 10:17

## 2024-05-23 RX ADMIN — IPRATROPIUM BROMIDE AND ALBUTEROL SULFATE 3 ML: .5; 3 SOLUTION RESPIRATORY (INHALATION) at 09:29

## 2024-05-23 RX ADMIN — PHENYLEPHRINE HYDROCHLORIDE 100 MCG: 10 INJECTION INTRAVENOUS at 10:27

## 2024-05-23 RX ADMIN — PHENYLEPHRINE HYDROCHLORIDE 0.3 MCG/KG/MIN: 10 INJECTION INTRAVENOUS at 10:03

## 2024-05-23 RX ADMIN — MORPHINE SULFATE 2 MG: 4 INJECTION, SOLUTION INTRAMUSCULAR; INTRAVENOUS at 23:54

## 2024-05-23 RX ADMIN — PHENYLEPHRINE HYDROCHLORIDE 100 MCG: 10 INJECTION INTRAVENOUS at 10:19

## 2024-05-23 RX ADMIN — INSULIN GLARGINE 20 UNITS: 100 INJECTION, SOLUTION SUBCUTANEOUS at 22:46

## 2024-05-23 RX ADMIN — PHENYLEPHRINE HYDROCHLORIDE 100 MCG: 10 INJECTION INTRAVENOUS at 10:47

## 2024-05-23 RX ADMIN — SODIUM CHLORIDE, POTASSIUM CHLORIDE, SODIUM LACTATE AND CALCIUM CHLORIDE: 600; 310; 30; 20 INJECTION, SOLUTION INTRAVENOUS at 09:45

## 2024-05-23 RX ADMIN — DEXAMETHASONE SODIUM PHOSPHATE 4 MG: 10 INJECTION, SOLUTION INTRAMUSCULAR; INTRAVENOUS at 10:00

## 2024-05-23 RX ADMIN — Medication 10 MG: at 10:21

## 2024-05-23 RX ADMIN — MORPHINE SULFATE 2 MG: 4 INJECTION, SOLUTION INTRAMUSCULAR; INTRAVENOUS at 21:48

## 2024-05-23 RX ADMIN — IPRATROPIUM BROMIDE AND ALBUTEROL SULFATE 3 ML: .5; 3 SOLUTION RESPIRATORY (INHALATION) at 14:37

## 2024-05-23 RX ADMIN — PHENYLEPHRINE HYDROCHLORIDE 100 MCG: 10 INJECTION INTRAVENOUS at 10:54

## 2024-05-23 RX ADMIN — PHENYLEPHRINE HYDROCHLORIDE 100 MCG: 10 INJECTION INTRAVENOUS at 10:39

## 2024-05-23 RX ADMIN — CLINDAMYCIN PHOSPHATE 900 MG: 900 INJECTION, SOLUTION INTRAVENOUS at 08:37

## 2024-05-23 RX ADMIN — PROPOFOL 160 MG: 10 INJECTION, EMULSION INTRAVENOUS at 09:56

## 2024-05-23 RX ADMIN — Medication 1 UNITS: at 10:45

## 2024-05-23 RX ADMIN — Medication 5 MG: at 10:25

## 2024-05-23 RX ADMIN — DEXTROSE MONOHYDRATE 25 ML: 25 INJECTION, SOLUTION INTRAVENOUS at 08:48

## 2024-05-23 RX ADMIN — ONDANSETRON 4 MG: 2 INJECTION INTRAMUSCULAR; INTRAVENOUS at 10:27

## 2024-05-23 RX ADMIN — PHENYLEPHRINE HYDROCHLORIDE 100 MCG: 10 INJECTION INTRAVENOUS at 10:24

## 2024-05-23 RX ADMIN — SODIUM CHLORIDE, POTASSIUM CHLORIDE, SODIUM LACTATE AND CALCIUM CHLORIDE: 600; 310; 30; 20 INJECTION, SOLUTION INTRAVENOUS at 10:44

## 2024-05-23 RX ADMIN — SUGAMMADEX 50 MG: 100 INJECTION, SOLUTION INTRAVENOUS at 11:46

## 2024-05-23 RX ADMIN — ROCURONIUM BROMIDE 50 MG: 10 INJECTION, SOLUTION INTRAVENOUS at 09:56

## 2024-05-23 RX ADMIN — FENTANYL CITRATE 50 MCG: 50 INJECTION INTRAMUSCULAR; INTRAVENOUS at 10:26

## 2024-05-23 RX ADMIN — PHENYLEPHRINE HYDROCHLORIDE 100 MCG: 10 INJECTION INTRAVENOUS at 10:21

## 2024-05-23 RX ADMIN — CLINDAMYCIN PHOSPHATE 600 MG: 600 INJECTION, SOLUTION INTRAVENOUS at 19:43

## 2024-05-23 RX ADMIN — PHENYLEPHRINE HYDROCHLORIDE 100 MCG: 10 INJECTION INTRAVENOUS at 10:51

## 2024-05-23 RX ADMIN — FENTANYL CITRATE 50 MCG: 50 INJECTION INTRAMUSCULAR; INTRAVENOUS at 09:56

## 2024-05-23 RX ADMIN — Medication 1 UNITS: at 10:40

## 2024-05-23 RX ADMIN — MIDAZOLAM 1 MG: 1 INJECTION INTRAMUSCULAR; INTRAVENOUS at 09:57

## 2024-05-23 RX ADMIN — IPRATROPIUM BROMIDE AND ALBUTEROL SULFATE 3 ML: .5; 3 SOLUTION RESPIRATORY (INHALATION) at 21:35

## 2024-05-23 RX ADMIN — PHENYLEPHRINE HYDROCHLORIDE 100 MCG: 10 INJECTION INTRAVENOUS at 10:23

## 2024-05-23 ASSESSMENT — ACTIVITIES OF DAILY LIVING (ADL)
ADLS_ACUITY_SCORE: 21
ADLS_ACUITY_SCORE: 24
ADLS_ACUITY_SCORE: 21

## 2024-05-23 ASSESSMENT — ENCOUNTER SYMPTOMS: SEIZURES: 1

## 2024-05-23 ASSESSMENT — LIFESTYLE VARIABLES: TOBACCO_USE: 1

## 2024-05-23 ASSESSMENT — COPD QUESTIONNAIRES: COPD: 1

## 2024-05-23 NOTE — OR NURSING
Both IV Access failed.  Attempts by this RN to establish IV access unsuccessful.  Swat RN to bedside to attempt IV access-unsuccessful.  PICC contacted and we were advised that there is no PICC, and we will need to page Picc Stat.  PICC Stat paged.

## 2024-05-23 NOTE — PROGRESS NOTES
RESPIRATORY CARE NOTE     Patient Name: Rukhsana Griffith  Today's Date: 5/23/2024       Pt given  duo neb per MD request in [facility].. BS are exp wheezes and diminished. Pt is on 2 lpm of oxygen via NC, SpO2 is 95%.pt has increased aeration after neb. Pt very sleepy, RT held mouthpiece for patient.  After neb BS are  exp wheezes throughout and patient feels not short of breath.  Erin Bruno, RT

## 2024-05-23 NOTE — OR NURSING
Patient continues to be somnolent.  Responds to deep stimulus by moving arms.  Slow to briefly open eyes.  Per MDA, we will draw and ABG.  Hospitalist also at bedside for patient evaluation

## 2024-05-23 NOTE — PROGRESS NOTES
[facility] Orthopedic Brief Operative Note    Pre-operative diagnosis: Lumbar radiculopathy, right [M54.16]  Lumbar stenosis [M48.061]   Post-operative diagnosis: Same   Procedure: Right L4-5 Lumbar decompression   Surgeon: Dr. Johnson Alba MD   Assistant(s): Cholo Rodriguez PA-C   Anesthesia: General endotracheal anesthesia   Estimated blood loss: Less than 10 ml   Total IV fluids: (See anesthesia record)   Blood transfusion: No transfusion was given during surgery   Total urine output: (See anesthesia record)   Drains: Hemovac   Specimens: None   Implants: None   Findings: Nerve compression   Complications: None   Condition: Stable   Weight bearing status: Weight bearing as tolerated   Activity: Activity as tolerated  Patient may move about with assist as indicated or with supervision   Orthotic management: [facility] hospitalist, medicine consult along with pain consult    Comments: See dictated operative report for full details

## 2024-05-23 NOTE — PROGRESS NOTES
Brief Anesthesia Provider Note:    Patient with very prolonged emergence from anesthesia. Remains somnolent in recovery. Notably somnolent at time per preop nursing staff/respiratory therapy in preop, but awake, alert for me on our preop interview. Patient withdraws and localizes to painful stimuli, moving all extremities, and will squeeze both hands, open eyes to repeated loud verbal requests. BMP and magnesium checked and no significant electrolyte abnormalities noted, normal blood sugars. Respirations regular (20 RPM) with appropriate end tidal CO2 and no significant obstruction/apnea. Duoneb administered for wheezing. Will add on blood gas to rule out significant hypercapnea, although it is noted the patient is a chronic CO2 retainer w/ her pulmonary disease.    Discussed with hospitalist at bedside. Suspicion for opioid overdose is low as her pupils remain 2-3 mm and she has had no significant respiratory depression. She does take several medications for sleep at night, perhaps these are contributing. No seizure like activity observed. Patient does have a history of illicit substance use and this remains in the differential. Will send lactate and CBC in addition to the above blood gas per the hospitalist, Dr. Jiménez's request. Will consider head CT pending labs although exam non focal, and will discuss disposition pending these results.    Reymundo Patel MD

## 2024-05-23 NOTE — INTERVAL H&P NOTE
"I have reviewed the surgical (or preoperative) H&P that is linked to this encounter, and examined the patient. There are no significant changes       I did speak wit the patient that the use of electrocautery may interfere/damage her gastric or bladder stimulators.  She told me that she has had major surgeries with the stimulators in place, but accepts the risks associated with electrocautery use.   Clinical Conditions Present on Arrival:  Clinically Significant Risk Factors Present on Admission                  # Obesity: Estimated body mass index is 30.9 kg/m  as calculated from the following:    Height as of this encounter: 1.499 m (4' 11\").    Weight as of this encounter: 69.4 kg (153 lb).       "

## 2024-05-23 NOTE — OR NURSING
Pt appears sleepy, difficulty getting through preop questions.  Informed MDA, requested order for dextrose after blood sugar result of 68.

## 2024-05-23 NOTE — PHARMACY-ADMISSION MEDICATION HISTORY
Pharmacist completed medication history with the patient while in the [facility].  Prior to admission (PTA) med list completed and updated in the electronic medical record (EMR).  Pharmacy Note - Admission Medication History  Pertinent Provider Information: none   ______________________________________________________________________  Prior To Admission (PTA) med list completed and updated in EMR.     Medications Prior to Admission   Medication Sig Dispense Refill Last Dose    acetaminophen (TYLENOL) 160 mg/5 mL (5 mL) Soln solution [ACETAMINOPHEN (TYLENOL) 160 MG/5 ML (5 ML) SOLN SOLUTION] Take 10 mL by mouth every 4 (four) hours as needed (Pain).          5/22/2024    albuterol (PROAIR HFA;PROVENTIL HFA;VENTOLIN HFA) 90 mcg/actuation inhaler [ALBUTEROL (PROAIR HFA;PROVENTIL HFA;VENTOLIN HFA) 90 MCG/ACTUATION INHALER] Inhale 2 puffs every 6 (six) hours as needed for wheezing.   5/23/2024 at am    albuterol (PROVENTIL) 2.5 mg /3 mL (0.083 %) nebulizer solution [ALBUTEROL (PROVENTIL) 2.5 MG /3 ML (0.083 %) NEBULIZER SOLUTION] Take 2.5 mg by nebulization every 4 (four) hours as needed for wheezing.          5/23/2024 at am    aluminum-magnesium hydroxide-simethicone (MAALOX MAX STRENGTH) 400-400-40 mg/5 mL suspension [ALUMINUM-MAGNESIUM HYDROXIDE-SIMETHICONE (MAALOX MAX STRENGTH) 400-400-40 MG/5 ML SUSPENSION] Take 5 mL by mouth every 4 (four) hours as needed for indigestion.          5/22/2024    ammonium lactate (AMLACTIN) 12 % cream [AMMONIUM LACTATE (AMLACTIN) 12 % CREAM] Apply 1 application topically 2 (two) times a day.          5/22/2024    atropine 1 % ophthalmic solution Place 1-2 drops under the tongue 2 times daily as needed for secretions   5/22/2024    budesonide-formoterol (SYMBICORT) 160-4.5 mcg/actuation inhaler [BUDESONIDE-FORMOTEROL (SYMBICORT) 160-4.5 MCG/ACTUATION INHALER] Inhale 2 puffs 2 (two) times a day.          5/23/2024 at am    cholecalciferol, vitamin D3, 2,000 unit Tab [CHOLECALCIFEROL, VITAMIN  D3, 2,000 UNIT TAB] Take 2,000 Units by mouth daily.   5/22/2024    cloZAPine (CLOZARIL) 100 MG tablet [CLOZAPINE (CLOZARIL) 100 MG TABLET] Take 200 mg by mouth at bedtime.          5/22/2024 at hs    diphenhydrAMINE (BENADRYL) 25 mg capsule [DIPHENHYDRAMINE (BENADRYL) 25 MG CAPSULE] Take 25 mg by mouth at bedtime as needed for itching.   5/22/2024    docusate sodium (COLACE) 100 MG capsule [DOCUSATE SODIUM (COLACE) 100 MG CAPSULE] Take 100 mg by mouth 2 (two) times a day.          5/23/2024 at am    EPINEPHrine (EPIPEN) 0.3 mg/0.3 mL atIn [EPINEPHRINE (EPIPEN) 0.3 MG/0.3 ML ATIN] Inject 0.3 mg into the shoulder, thigh, or buttocks as needed.   Unknown    escitalopram oxalate (LEXAPRO) 10 MG tablet Take 15 mg by mouth daily   5/23/2024 at am    fluoride, sodium, (DENTAGEL) 1.1 % Gel dental gel [FLUORIDE, SODIUM, (DENTAGEL) 1.1 % GEL DENTAL GEL] Apply 1 application to teeth at bedtime.   5/22/2024    fluticasone (FLONASE) 50 MCG/ACT nasal spray Spray 1 spray into both nostrils daily   5/23/2024 at am    fluticasone-salmeterol (ADVAIR) 500-50 MCG/ACT inhaler Inhale 1 puff into the lungs 2 times daily   5/23/2024 at am    gabapentin (NEURONTIN) 100 MG capsule Take 100 mg by mouth 2 times daily (before meals) 100mg in morning, 100mg with lunch, 300mg at HS   5/23/2024 at am    gabapentin (NEURONTIN) 300 MG capsule Take 300 mg by mouth at bedtime 100mg in morning, 100mg with lunch, 300mg at HS   5/22/2024 at hs    glucagon, human recombinant, (GLUCAGEN HYPOKIT) 1 mg injection [GLUCAGON, HUMAN RECOMBINANT, (GLUCAGEN HYPOKIT) 1 MG INJECTION] Infuse 1 mg into a venous catheter once as needed (Low blood sugars).          Unknown    insulin aspart (NOVOLOG FLEXPEN) 100 UNIT/ML pen Inject 15 Units Subcutaneous daily (before lunch)   5/22/2024    insulin aspart (NOVOLOG PEN) 100 UNIT/ML pen Inject 15 Units Subcutaneous daily (with dinner)   5/22/2024    insulin aspart U-100 (NOVOLOG) 100 unit/mL injection Inject 20 Units  Subcutaneous daily before breakfast   5/23/2024 at (4 units)    insulin glargine (LANTUS) 100 unit/mL injection Inject 50 Units Subcutaneous at bedtime   5/22/2024 at hs    ipratropium-albuterol (DUO-NEB) 0.5-2.5 mg/3 mL nebulizer [IPRATROPIUM-ALBUTEROL (DUO-NEB) 0.5-2.5 MG/3 ML NEBULIZER] Take 3 mL by nebulization 4 (four) times a day as needed.          5/23/2024 at am    lisinopril (PRINIVIL,ZESTRIL) 10 MG tablet [LISINOPRIL (PRINIVIL,ZESTRIL) 10 MG TABLET] Take 10 mg by mouth daily.   5/22/2024    loratadine (CLARITIN) 10 mg tablet [LORATADINE (CLARITIN) 10 MG TABLET] Take 10 mg by mouth daily.   5/22/2024    lubiprostone (AMITIZA) 24 MCG capsule [LUBIPROSTONE (AMITIZA) 24 MCG CAPSULE] Take 24 mcg by mouth 2 (two) times a day.   5/22/2024    magnesium hydroxide (MILK OF MAG) 400 mg/5 mL Susp suspension [MAGNESIUM HYDROXIDE (MILK OF MAG) 400 MG/5 ML SUSP SUSPENSION] Take 30 mL by mouth 2 (two) times a day as needed.          5/22/2024    mometasone (NASONEX) 50 mcg/actuation nasal spray [MOMETASONE (NASONEX) 50 MCG/ACTUATION NASAL SPRAY] 2 sprays into each nostril daily.   5/23/2024 at am    multivitamin with minerals (THERA-M) 9 mg iron-400 mcg Tab tablet [MULTIVITAMIN WITH MINERALS (THERA-M) 9 MG IRON-400 MCG TAB TABLET] Take 1 tablet by mouth daily.   5/22/2024 at am    nicotine (NICODERM CQ) 21 mg/24 hr [NICOTINE (NICODERM CQ) 21 MG/24 HR] Place 1 patch on the skin daily as needed for smoking cessation.          5/23/2024 at (removed pre op)    omeprazole (PRILOSEC) 20 MG DR capsule Take 20 mg by mouth daily   5/23/2024 at am    polyethylene glycol (MIRALAX) 17 gram packet [POLYETHYLENE GLYCOL (MIRALAX) 17 GRAM PACKET] Take 17 g by mouth 2 (two) times a day.          5/23/2024 at am    pravastatin (PRAVACHOL) 40 MG tablet [PRAVASTATIN (PRAVACHOL) 40 MG TABLET] Take 40 mg by mouth every morning.          5/23/2024 at am    ramelteon (ROZEREM) 8 mg tablet [RAMELTEON (ROZEREM) 8 MG TABLET] Take 8 mg by mouth at  bedtime.   5/22/2024 at hs    SUMAtriptan (IMITREX) 100 MG tablet [SUMATRIPTAN (IMITREX) 100 MG TABLET] Take 100 mg by mouth every 2 (two) hours as needed for migraine.   5/9/2024    topiramate (TOPAMAX) 200 MG tablet [TOPIRAMATE (TOPAMAX) 200 MG TABLET] Take 200 mg by mouth 2 (two) times a day.   5/23/2024 at am    traMADol (ULTRAM) 50 MG tablet Take 50 mg by mouth every 6 hours as needed for severe pain   5/22/2024    triamcinolone (KENALOG) 0.1 % ointment [TRIAMCINOLONE (KENALOG) 0.1 % OINTMENT] Apply 1 application topically 2 (two) times a day.            Information source(s): Patient and H/P.  NO recent Rx dispense info available.  Patient was asked about OTC/herbal products specifically.  PTA med list reflects this.  Based on the pharmacist s assessment, the PTA med list information appears reliable  Allergies were reviewed, assessed, and updated with the patient.    Medications available for use during hospital stay: NONE  Thank you for the opportunity to participate in the care of this patient.    The patient was asked about OTC/herbal products specifically and the PTA med list reflects the patient's response.    Allergies were reviewed and assessed with the patient, responses were updated in the EMR.    Thank you for the opportunity to participate in the care of this patient.    [facility] 5/23/2024 8:52 AM

## 2024-05-23 NOTE — CONSULTS
[facility] ACUTE PAIN SERVICE CONSULTATION   [facility], [facility], [facility], [facility], [place]     Date of Admission:  5/23/2024  Date of Consult (When I saw the patient): 05/23/24     Assessment/Plan:     Rukhsana Griffith is a 63 year old female who was admitted on 5/23/2024.  Pain team was asked to see the patient for operative pain, multiple allergies. Admitted for planned procedure. History of diabetes mellitus type 2, schizoaffective disorder, migraine, osteoporosis, irritable bowel syndrome, asthma, hyponatremia, seizures, dyslipidemia, hypertension, COPD, anxiety, diabetic gastroparesis, peripheral neuropathy, GI bleed, tobacco abuse, personality disorder, hypokalemia.     Post op day: Day of Surgery. Right L4-5 Lumbar decompression    Patient is seen down in PACU.  Patient remains sedated in PACU.  Patient requiring deep stimulation for light arousal.  She will briefly open her eyes and moan however returns to sleep when not stimulated.  History obtained through chart review    Of note patient has 44 allergies listed in her chart.  Some but not all allergies include aspirin, hydrocodone (headache, hives, itching), hydromorphone (rash, seizures), hydroxyzine (anaphylaxis, shortness of breath, dyspnea), lidocaine patch but tolerates injectable lidocaine, oxycodone (hives, rash), diazepam (or rash, parasomnia), Haldol (rash), Toradol (rash), adhesive tape, codeine (headache, migraines), fentanyl (rash), tizanidine (rash), Percocet (rash), Zofran (rash).     Home medications include acetaminophen solution, clozapine 200 mg at bedtime, Benadryl 25 mg at bedtime as needed, Colace, gabapentin 100 mg twice daily and 300 mg at bedtime, Rozerem 8 mg at bedtime,, sumatriptan 100 mg as needed for migraine, Topamax 200 mg twice daily, tramadol 50 mg every 6 hours as needed    PLAN:   1) Pain is consistent with post op pain.   Multimodal Medication Therapy  Topical: allergies noted to lidocaine   NSAID'S: CrCl  115. Allergies noted to Toradol   Steroids: none  Muscle Relaxants: consider Robaxin prn   Adjuvants: APAP  Opioids: Oxycodone 5-10 mg q4h prn (allergy listed as rash, hives, will monitor), per  she fills tramadol however history notable for seizures.   IV Pain medication: morphine prn (codeine allergy listed as rash, will monitor)  Non-medication interventions: Ice, Rest, PT, and OT  Constipation Prophylaxis: Bisacodyl Suppository, Senna-docusate, and Miralax    -Opioid prescriber has been Kei De Leon MD - Primary Care Provider   -MN  pulled from system on 5/23/24. This indicates fills for tramadol, gabapentin, Robitussin with codeine.  3/20/2024 tramadol 50 mg #60 for 15 days  1/29/2024 tramadol 50 mg #60 for 15 days  12/15/2023 tramadol 50 mg #60 for 15 days  11/20/2023 gabapentin 100 mg #30 for 30 days  11/3/2023 tramadol 50 mg #30 for 8 days  Discharge Recommendations - We recommend prescribing the following at the time of discharge: To be determined     History of Present Illness (HPI):       Rukhsana Griffith is a 63 year old female who presented for planned procedure.  Past medical history as above. The pain is reported to be acute, chronic, located in the low back     Per MN  review, the patient does have an opioid tolerance. Opioid induced side effects noted and include: multiple allergies noted as above    Reviewed medical record, labs, imaging, ED note, and care everywhere.       Medical History   PAST MEDICAL HISTORY:   Past Medical History:   Diagnosis Date    Anemia     Cerebral artery occlusion with cerebral infarction (H)     COPD (chronic obstructive pulmonary disease) (H)     Diabetes (H)     Gastroesophageal reflux disease     History of blood transfusion     HLD (hyperlipidemia)     Hypertension     Methamphetamine abuse (H)     Oxygen dependent     Schizoaffective disorder (H)     Seizures (H)        PAST SURGICAL HISTORY:   Past Surgical History:   Procedure Laterality Date     HYSTERECTOMY      KNEE SURGERY      OTHER SURGICAL HISTORY      gastric pacemaker    OTHER SURGICAL HISTORY      interstim placement    FL REVISE/REMOVE PERIPH/GASTRIC NEUROSTIM/ N/A 5/15/2019    Procedure: PARTIAL REMOVAL OLD LEAD;  Surgeon: Jcarlos Muñiz MD;  Location: [facility];  Service: Urology    RELEASE CARPAL TUNNEL         FAMILY HISTORY:   Family History   Adopted: Yes       SOCIAL HISTORY:   Social History     Tobacco Use    Smoking status: Every Day     Current packs/day: 0.25     Types: Cigarettes    Smokeless tobacco: Never   Substance Use Topics    Alcohol use: Not Currently        HEALTH & LIFESTYLE PRACTICES  Tobacco:  reports that she has been smoking cigarettes. She has never used smokeless tobacco.  Alcohol:  reports that she does not currently use alcohol.  Illicit drugs:  reports that she does not currently use drugs.    Allergies  Allergies   Allergen Reactions    Aspirin Unknown     Other reaction(s): irritation for stomach takes 81 at home with milk     Bee Sting Kit [Bee Venom] Anaphylaxis    Botox [Onabotulinumtoxina] Hives and Shortness Of Breath    Garlic Hives    Hydrocodone Headache, Hives and Itching    Hydromorphone Other (See Comments) and Rash     Seizures    Hydroxyzine Hives, Other (See Comments), Anaphylaxis and Shortness Of Breath     Dyspnea    Hymenoptera Allergenic Extract [Wasp Venom Protein] Anaphylaxis    Iodinated Contrast Media Hives, Other (See Comments), Itching and Swelling     EDEMA    Latex Shortness Of Breath    Lidocaine Hives     lidoderm patch only; tolerates injectable lidocaine    Onion Hives     green, red, yellow, mushrooms, garlic---all cause pt to have hives    Oxycodone Hives and Rash    Procaine Hives     tolerates injectable bupivacaine and lidocaine      Sucralose Anaphylaxis    Diazepam Rash     also: parasomnia, as pt reported waking up in bathtub    Haloperidol Rash    Ketorolac Rash    Lithium Rash    Meperidine Rash     "Diatrizoate Meglumine [Diatrizoate] Hives and Swelling    Green Pepper [Capsicum] Hives    Metformin Other (See Comments)     \"Stomach bleeding and kidney infection\"    Adhesive Tape-Silicones [Adhesive Tape] Rash    Pollok [Nuts] Rash    Cephalosporins Rash    Chlorpromazine Rash     THORAZINE    Chlorpropamide Rash    Coconut (Cocos Nucifera) Rash    Codeine Headache     Migraines       Darvocet A500 [Propoxyphene N-Apap] Rash    Dipyridamole Rash    Fentanyl Rash    Fluoxetine Rash    Furosemide Rash    Glyburide Other (See Comments) and Rash     Other reaction(s): hypoglycemia (patient is very sensitive to sulfonylureas)    Ondansetron Rash    Penicillins Rash and Other (See Comments)     Migraines, dizzy and sweating    Percocet [Oxycodone-Acetaminophen] Rash    Shellfish Containing Products [Shellfish-Derived Products] Rash    Sulfa (Sulfonamide Antibiotics) [Sulfa Antibiotics] Rash    Thallium-201 [Thallous Chloride Tl 201] Rash    Thioridazine Rash    Tizanidine Rash       Problem List  Patient Active Problem List    Diagnosis Date Noted    Degenerative lumbar spinal stenosis 05/23/2024     Priority: Medium    Episode of loss of consciousness 09/12/2023     Priority: Medium    Chronic seizure disorder with history of head trauma (H) 09/12/2023     Priority: Medium    Hypoxia 06/24/2023     Priority: Medium    COPD exacerbation (H) 06/04/2023     Priority: Medium    Tension headache 04/13/2023     Priority: Medium    Atypical chest pain 04/13/2023     Priority: Medium    COPD mixed type (H) 12/19/2022     Priority: Medium    Iron deficiency anemia 12/19/2022     Priority: Medium     Formatting of this note might be different from the original.  EGD negative 6/07, heme + 8/08      Right lumbar radiculopathy 12/19/2022     Priority: Medium    History of methamphetamine abuse (H) 07/28/2022     Priority: Medium    Prolonged Q-T interval on ECG 06/16/2022     Priority: Medium    Left homonymous hemianopsia " 05/26/2022     Priority: Medium    Retrobulbar neuritis of both eyes 05/26/2022     Priority: Medium    Altered mental status, unspecified 05/14/2022     Priority: Medium    Mixed conductive and sensorineural hearing loss of right ear with restricted hearing of left ear 04/26/2022     Priority: Medium    Otalgia, right 04/26/2022     Priority: Medium    Sensorineural hearing loss (SNHL) of left ear with restricted hearing of right ear 04/26/2022     Priority: Medium    Stress incontinence of urine 09/13/2021     Priority: Medium    Weakness of right foot 09/13/2021     Priority: Medium    Occipital neuralgia of left side 02/07/2020     Priority: Medium    Pelvic and perineal pain 04/02/2019     Priority: Medium    Calcified granuloma of lung (H) 11/19/2018     Priority: Medium     Formatting of this note might be different from the original.  Neg Quant gold 2014, granulomas in liver as well      Perennial allergic rhinitis 06/28/2018     Priority: Medium    Microcytic hypochromic anemia 02/04/2018     Priority: Medium    Delayed gastric emptying 02/04/2018     Priority: Medium    Abdominal pain, generalized 02/04/2018     Priority: Medium    Hypoglycemia 02/04/2018     Priority: Medium    Psychophysiological insomnia 11/10/2017     Priority: Medium    Chest pain 09/03/2017     Priority: Medium    Personality disorder (H) 08/23/2016     Priority: Medium    Spasm 07/21/2016     Priority: Medium    Tobacco abuse 06/30/2016     Priority: Medium    Vitamin D deficiency 06/14/2016     Priority: Medium    Increased frequency of urination 01/26/2016     Priority: Medium    Overactive bladder 08/03/2015     Priority: Medium    Unspecified visual disturbance 10/24/2014     Priority: Medium     Formatting of this note might be different from the original.   Side effect of meds.      Peripheral neuropathy 05/09/2014     Priority: Medium    Diabetic gastroparesis (H) 09/23/2013     Priority: Medium     Formatting of this note  might be different from the original.  S/p placement of gastric neurostimulator      Anxiety 08/14/2013     Priority: Medium    Unilateral weakness 09/15/2012     Priority: Medium    Hypertension 09/06/2012     Priority: Medium    Dyslipidemia, goal LDL below 100 01/02/2012     Priority: Medium    Seizure disorder, grand mal (H) 07/23/2011     Priority: Medium    Chronic hyponatremia 01/21/2011     Priority: Medium    Asthma 10/17/2010     Priority: Medium     Formatting of this note might be different from the original.  Per review of hx appears mild to moderate persistent.  On advair      Irritable bowel syndrome 10/17/2010     Priority: Medium     Formatting of this note might be different from the original.  Chronic LLQ abdominal pain with frequent diarrhea/constipation, nausea/vomiting, and history of hemorrhoids.  Blood in her stool 3-4 times/mo. at baseline.  Chronic inflammation seen of colon from transverse to descending/signmoid.  Colonoscopy in 8/2009 was normal with multiple biopsies taken.      Neutrophilic leukocytosis 10/17/2010     Priority: Medium     Formatting of this note might be different from the original.  Peripheral smears (7/2-1- and 7/2008) confirm iron deficiency anemia (2010)+ likely reactive leukocytosis.  RANDA-2 negative, on clozapine (risk of leukopenia)      Migraine 02/19/2010     Priority: Medium    Osteoporosis 02/19/2010     Priority: Medium    Schizoaffective disorder, chronic condition (H) 09/14/2007     Priority: Medium     Formatting of this note might be different from the original.      Type II diabetes mellitus with complication (H) 09/14/2007     Priority: Medium     Formatting of this note might be different from the original.  + neuropathy (EMG done 4/2010) and nephropathy.  Follows with Dr. Huffman.  Proteinuria present  Hgb AIC 7.9% Apr 2012  No DR eye exam 8/15; Dr. Rehman, [facility]         Prior to Admission Medications   Medications Prior to Admission    Medication Sig Dispense Refill Last Dose    acetaminophen (TYLENOL) 160 mg/5 mL (5 mL) Soln solution [ACETAMINOPHEN (TYLENOL) 160 MG/5 ML (5 ML) SOLN SOLUTION] Take 10 mL by mouth every 4 (four) hours as needed (Pain).          5/22/2024    albuterol (PROAIR HFA;PROVENTIL HFA;VENTOLIN HFA) 90 mcg/actuation inhaler [ALBUTEROL (PROAIR HFA;PROVENTIL HFA;VENTOLIN HFA) 90 MCG/ACTUATION INHALER] Inhale 2 puffs every 6 (six) hours as needed for wheezing.   5/23/2024 at am    albuterol (PROVENTIL) 2.5 mg /3 mL (0.083 %) nebulizer solution [ALBUTEROL (PROVENTIL) 2.5 MG /3 ML (0.083 %) NEBULIZER SOLUTION] Take 2.5 mg by nebulization every 4 (four) hours as needed for wheezing.          5/23/2024 at am    aluminum-magnesium hydroxide-simethicone (MAALOX MAX STRENGTH) 400-400-40 mg/5 mL suspension [ALUMINUM-MAGNESIUM HYDROXIDE-SIMETHICONE (MAALOX MAX STRENGTH) 400-400-40 MG/5 ML SUSPENSION] Take 5 mL by mouth every 4 (four) hours as needed for indigestion.          5/22/2024    ammonium lactate (AMLACTIN) 12 % cream [AMMONIUM LACTATE (AMLACTIN) 12 % CREAM] Apply 1 application topically 2 (two) times a day.          5/22/2024    atropine 1 % ophthalmic solution Place 1-2 drops under the tongue 2 times daily as needed for secretions   5/22/2024    budesonide-formoterol (SYMBICORT) 160-4.5 mcg/actuation inhaler [BUDESONIDE-FORMOTEROL (SYMBICORT) 160-4.5 MCG/ACTUATION INHALER] Inhale 2 puffs 2 (two) times a day.          5/23/2024 at am    cholecalciferol, vitamin D3, 2,000 unit Tab [CHOLECALCIFEROL, VITAMIN D3, 2,000 UNIT TAB] Take 2,000 Units by mouth daily.   5/22/2024    cloZAPine (CLOZARIL) 100 MG tablet [CLOZAPINE (CLOZARIL) 100 MG TABLET] Take 200 mg by mouth at bedtime.          5/22/2024 at hs    diphenhydrAMINE (BENADRYL) 25 mg capsule [DIPHENHYDRAMINE (BENADRYL) 25 MG CAPSULE] Take 25 mg by mouth at bedtime as needed for itching.   5/22/2024    docusate sodium (COLACE) 100 MG capsule [DOCUSATE SODIUM (COLACE) 100 MG  CAPSULE] Take 100 mg by mouth 2 (two) times a day.          5/23/2024 at am    EPINEPHrine (EPIPEN) 0.3 mg/0.3 mL atIn [EPINEPHRINE (EPIPEN) 0.3 MG/0.3 ML ATIN] Inject 0.3 mg into the shoulder, thigh, or buttocks as needed.   Unknown    escitalopram oxalate (LEXAPRO) 10 MG tablet Take 15 mg by mouth daily   5/23/2024 at am    fluoride, sodium, (DENTAGEL) 1.1 % Gel dental gel [FLUORIDE, SODIUM, (DENTAGEL) 1.1 % GEL DENTAL GEL] Apply 1 application to teeth at bedtime.   5/22/2024    fluticasone (FLONASE) 50 MCG/ACT nasal spray Spray 1 spray into both nostrils daily   5/23/2024 at am    fluticasone-salmeterol (ADVAIR) 500-50 MCG/ACT inhaler Inhale 1 puff into the lungs 2 times daily   5/23/2024 at am    gabapentin (NEURONTIN) 100 MG capsule Take 100 mg by mouth 2 times daily (before meals) 100mg in morning, 100mg with lunch, 300mg at HS   5/23/2024 at am    gabapentin (NEURONTIN) 300 MG capsule Take 300 mg by mouth at bedtime 100mg in morning, 100mg with lunch, 300mg at HS   5/22/2024 at hs    glucagon, human recombinant, (GLUCAGEN HYPOKIT) 1 mg injection [GLUCAGON, HUMAN RECOMBINANT, (GLUCAGEN HYPOKIT) 1 MG INJECTION] Infuse 1 mg into a venous catheter once as needed (Low blood sugars).          Unknown    insulin aspart (NOVOLOG FLEXPEN) 100 UNIT/ML pen Inject 15 Units Subcutaneous daily (before lunch)   5/22/2024    insulin aspart (NOVOLOG PEN) 100 UNIT/ML pen Inject 15 Units Subcutaneous daily (with dinner)   5/22/2024    insulin aspart U-100 (NOVOLOG) 100 unit/mL injection Inject 20 Units Subcutaneous daily before breakfast   5/23/2024 at (4 units)    insulin glargine (LANTUS) 100 unit/mL injection Inject 50 Units Subcutaneous at bedtime   5/22/2024 at hs    ipratropium-albuterol (DUO-NEB) 0.5-2.5 mg/3 mL nebulizer [IPRATROPIUM-ALBUTEROL (DUO-NEB) 0.5-2.5 MG/3 ML NEBULIZER] Take 3 mL by nebulization 4 (four) times a day as needed.          5/23/2024 at am    lisinopril (PRINIVIL,ZESTRIL) 10 MG tablet [LISINOPRIL  (PRINIVIL,ZESTRIL) 10 MG TABLET] Take 10 mg by mouth daily.   5/22/2024    loratadine (CLARITIN) 10 mg tablet [LORATADINE (CLARITIN) 10 MG TABLET] Take 10 mg by mouth daily.   5/22/2024    lubiprostone (AMITIZA) 24 MCG capsule [LUBIPROSTONE (AMITIZA) 24 MCG CAPSULE] Take 24 mcg by mouth 2 (two) times a day.   5/22/2024    magnesium hydroxide (MILK OF MAG) 400 mg/5 mL Susp suspension [MAGNESIUM HYDROXIDE (MILK OF MAG) 400 MG/5 ML SUSP SUSPENSION] Take 30 mL by mouth 2 (two) times a day as needed.          5/22/2024    mometasone (NASONEX) 50 mcg/actuation nasal spray [MOMETASONE (NASONEX) 50 MCG/ACTUATION NASAL SPRAY] 2 sprays into each nostril daily.   5/23/2024 at am    multivitamin with minerals (THERA-M) 9 mg iron-400 mcg Tab tablet [MULTIVITAMIN WITH MINERALS (THERA-M) 9 MG IRON-400 MCG TAB TABLET] Take 1 tablet by mouth daily.   5/22/2024 at am    nicotine (NICODERM CQ) 21 mg/24 hr [NICOTINE (NICODERM CQ) 21 MG/24 HR] Place 1 patch on the skin daily as needed for smoking cessation.          5/23/2024 at (removed pre op)    omeprazole (PRILOSEC) 20 MG DR capsule Take 20 mg by mouth daily   5/23/2024 at am    polyethylene glycol (MIRALAX) 17 gram packet [POLYETHYLENE GLYCOL (MIRALAX) 17 GRAM PACKET] Take 17 g by mouth 2 (two) times a day.          5/23/2024 at am    pravastatin (PRAVACHOL) 40 MG tablet [PRAVASTATIN (PRAVACHOL) 40 MG TABLET] Take 40 mg by mouth every morning.          5/23/2024 at am    ramelteon (ROZEREM) 8 mg tablet [RAMELTEON (ROZEREM) 8 MG TABLET] Take 8 mg by mouth at bedtime.   5/22/2024 at hs    SUMAtriptan (IMITREX) 100 MG tablet [SUMATRIPTAN (IMITREX) 100 MG TABLET] Take 100 mg by mouth every 2 (two) hours as needed for migraine.   5/9/2024    topiramate (TOPAMAX) 200 MG tablet [TOPIRAMATE (TOPAMAX) 200 MG TABLET] Take 200 mg by mouth 2 (two) times a day.   5/23/2024 at am    traMADol (ULTRAM) 50 MG tablet Take 50 mg by mouth every 6 hours as needed for severe pain   5/22/2024     "triamcinolone (KENALOG) 0.1 % ointment [TRIAMCINOLONE (KENALOG) 0.1 % OINTMENT] Apply 1 application topically 2 (two) times a day.          Review of Systems  Complete ROS reviewed, unless noted in HPI, all other systems reviewed (with patient) and all others found to be negative.      Objective:     Physical Exam:  /60   Pulse 81   Temp 97.8  F (36.6  C) (Temporal)   Resp 20   Ht 1.499 m (4' 11\")   Wt 69.4 kg (153 lb)   SpO2 94%   BMI 30.90 kg/m    Weight:   Vitals:    05/23/24 0751   Weight: 69.4 kg (153 lb)      Body mass index is 30.9 kg/m .    General Appearance:  Somnolent   Head:  Normocephalic, without obvious abnormality, atraumatic   Eyes:  PERRL, conjunctiva/corneas clear, EOM's intact   ENT/Throat: Lips, mucosa, and tongue normal; teeth and gums normal   Lymph/Neck: Supple, symmetrical, trachea midline   Lungs:   Clear to auscultation bilaterally, respirations unlabored, facemask O2   Cardiovascular/Heart:  Regular rate and rhythm, S1, S2 normal   Abdomen:   Soft, non-tender   Musculoskeletal: Extremities normal, atraumatic   Skin: Warm, dry, surgical drain intact, incision is covered   Neurologic: Somnolent       Imaging: Reviewed I have personally reviewed pertinent notes, labs, tests, and radiologic imaging in patient's chart.  Labs: Reviewed I have personally reviewed pertinent notes, labs, tests, and radiologic imaging in patient's chart.  Notes: Reviewed I have personally reviewed pertinent notes, labs, tests, and radiologic imaging in patient's chart.    Total time spent 55 minutes with greater than 50% in consultation, education and coordination of care.   Also discussed with RN, Orthopedics.   Treatment plan includes: multimodal pain approach, Hospital Medicine Service for medical management, Orthopedics.    Elements of Medical Decision Making as described above. High level of decision making required due to 1 or more chronic illness with severe exacerbation, progression, and side " effects of treatment. Acute or chronic illness or injury or surgery. High risk therapy including opioids, high risk drug therapy including oral and/or parenteral controlled substances.    Patient is understanding of the plan. All questions and concerns addressed to patient's satisfaction.     Thank you for this consultation.    Loli ARZATE, FNP-C  Acute Care Pain Management Program   [facility]   Monday-Friday 8a-4p   Page via Epic or Disenia

## 2024-05-23 NOTE — OR NURSING
RT at bedside to draw ABG, Lab paged to draw CBC per request of BYRON.  Patient continues to be somnolent with deep stimulus to elicit response.  Per discussion with BYRON Patel, this RN states she feels that patient should be admitted to higher level of care with close monitoring and frequent neuro checks.  MDA in agreement.

## 2024-05-23 NOTE — OR NURSING
Dr. Patel at bedside for patient assessment.  Patient requiring deep stimulation for light arousal. Moves arms and legs in response. Patient briefly opens eyes.  Patient whispers name when asked for her name.  Returns to sleep when not stimulated.

## 2024-05-23 NOTE — ANESTHESIA PROCEDURE NOTES
Airway       Patient location during procedure: OR       Procedure Start/Stop Times: 5/23/2024 9:59 AM  Staff -        CRNA: Huan Jiménez APRN CRNA       Performed By: CRNA  Consent for Airway        Urgency: elective  Indications and Patient Condition       Indications for airway management: felix-procedural       Induction type:intravenous       Mask difficulty assessment: 1 - vent by mask    Final Airway Details       Final airway type: endotracheal airway       Successful airway: ETT - single  Endotracheal Airway Details        ETT size (mm): 7.0       Cuffed: yes       Cuff volume (mL): 10       Successful intubation technique: video laryngoscopy       VL Blade Size: Glidescope 3       Grade View of Cords: 1       Adjucts: stylet       Position: Right       Measured from: lips       Secured at (cm): 21       Bite block used: Soft    Post intubation assessment        Placement verified by: capnometry, equal breath sounds and chest rise        Number of attempts at approach: 1       Number of other approaches attempted: 0       Secured with: tape       Ease of procedure: easy       Dentition: Unchanged    Medication(s) Administered   Medication Administration Time: 5/23/2024 9:59 AM

## 2024-05-23 NOTE — ANESTHESIA PREPROCEDURE EVALUATION
Anesthesia Pre-Procedure Evaluation    Patient: Rukhsana Griffith   MRN: [id number] : 1960        Procedure : Procedure(s):  RIGHT LUMBAR 4 - LUMBAR 5 MEDIAL FACETECTOMY DECOMPRESSION          Past Medical History:   Diagnosis Date    Anemia     Cerebral artery occlusion with cerebral infarction (H)     COPD (chronic obstructive pulmonary disease) (H)     Diabetes (H)     Gastroesophageal reflux disease     History of blood transfusion     HLD (hyperlipidemia)     Hypertension     Methamphetamine abuse (H)     Oxygen dependent     Schizoaffective disorder (H)     Seizures (H)       Past Surgical History:   Procedure Laterality Date    HYSTERECTOMY      KNEE SURGERY      OTHER SURGICAL HISTORY      gastric pacemaker    OTHER SURGICAL HISTORY      interstim placement    [place] REVISE/REMOVE PERIPH/GASTRIC NEUROSTIM/ N/A 5/15/2019    Procedure: PARTIAL REMOVAL OLD LEAD;  Surgeon: Jcarlos Muñiz MD;  Location: [facility];  Service: Urology    RELEASE CARPAL TUNNEL        Allergies   Allergen Reactions    Aspirin Unknown     Other reaction(s): irritation for stomach takes 81 at home with milk     Bee Sting Kit [Bee Venom] Anaphylaxis    Botox [Onabotulinumtoxina] Hives and Shortness Of Breath    Garlic Hives    Hydrocodone Headache, Hives and Itching    Hydromorphone Other (See Comments) and Rash     Seizures    Hydroxyzine Hives, Other (See Comments), Anaphylaxis and Shortness Of Breath     Dyspnea    Hymenoptera Allergenic Extract [Wasp Venom Protein] Anaphylaxis    Iodinated Contrast Media Hives, Other (See Comments), Itching and Swelling     EDEMA    Latex Shortness Of Breath    Lidocaine Hives     lidoderm patch only; tolerates injectable lidocaine    Onion Hives     green, red, yellow, mushrooms, garlic---all cause pt to have hives    Oxycodone Hives and Rash    Procaine Hives     tolerates injectable bupivacaine and lidocaine      Sucralose Anaphylaxis    Diazepam Rash     also: parasomnia,  "as pt reported waking up in bathtub    Haloperidol Rash    Ketorolac Rash    Lithium Rash    Meperidine Rash    Diatrizoate Meglumine [Diatrizoate] Hives and Swelling    Green Pepper [Capsicum] Hives    Metformin Other (See Comments)     \"Stomach bleeding and kidney infection\"    Adhesive Tape-Silicones [Adhesive Tape] Rash    [place] [Nuts] Rash    Cephalosporins Rash    Chlorpromazine Rash     THORAZINE    Chlorpropamide Rash    Coconut (Cocos Nucifera) Rash    Codeine Headache     Migraines       Darvocet A500 [Propoxyphene N-Apap] Rash    Dipyridamole Rash    Fentanyl Rash    Fluoxetine Rash    Furosemide Rash    Glyburide Other (See Comments) and Rash     Other reaction(s): hypoglycemia (patient is very sensitive to sulfonylureas)    Ondansetron Rash    Penicillins Rash and Other (See Comments)     Migraines, dizzy and sweating    Percocet [Oxycodone-Acetaminophen] Rash    Shellfish Containing Products [Shellfish-Derived Products] Rash    Sulfa (Sulfonamide Antibiotics) [Sulfa Antibiotics] Rash    Thallium-201 [Thallous Chloride Tl 201] Rash    Thioridazine Rash    Tizanidine Rash      Social History     Tobacco Use    Smoking status: Every Day     Current packs/day: 0.25     Types: Cigarettes    Smokeless tobacco: Never   Substance Use Topics    Alcohol use: Not Currently      Wt Readings from Last 1 Encounters:   05/23/24 69.4 kg (153 lb)        Anesthesia Evaluation            ROS/MED HX  ENT/Pulmonary:     (+)                tobacco use,      asthma    COPD, O2 dependent, during Nighttime,            Neurologic: Comment: Sacral stimulator in place    (+)       seizures,   CVA,  with deficits, - Mild R side weakness.                   Cardiovascular:     (+)  hypertension- -   -  - -                                   (-) murmur   METS/Exercise Tolerance:     Hematologic:  - neg hematologic  ROS     Musculoskeletal:       GI/Hepatic: Comment: Gastric pacemaker in place    (+) GERD,                 " "  Renal/Genitourinary:  - neg Renal ROS     Endo: Comment: Chronic hyponatremia    (+)  type II DM,                    Psychiatric/Substance Use:     (+) psychiatric history    : Hx methamphetamine use.    Infectious Disease:       Malignancy:       Other:      (+)  , H/O Chronic Pain,         Physical Exam    Airway  airway exam normal      Mallampati: II   TM distance: < 3 FB   Neck ROM: full   Mouth opening: > 3 cm    Respiratory Devices and Support         Dental       (+) Minor Abnormalities - some fillings, tiny chips      Cardiovascular   cardiovascular exam normal       Rhythm and rate: regular and normal (-) no murmur    Pulmonary           (+) decreased breath sounds and wheezes           OUTSIDE LABS:  CBC:   Lab Results   Component Value Date    WBC 13.8 (H) 12/07/2023    WBC 13.3 (H) 09/12/2023    HGB 15.8 (H) 12/07/2023    HGB 14.2 09/12/2023    HCT 48.3 (H) 12/07/2023    HCT 45.2 09/12/2023     12/07/2023     09/12/2023     BMP:   Lab Results   Component Value Date     12/07/2023     09/12/2023    POTASSIUM 3.6 12/07/2023    POTASSIUM 4.1 09/12/2023    CHLORIDE 102 12/07/2023    CHLORIDE 105 09/12/2023    CO2 24 12/07/2023    CO2 24 09/12/2023    BUN 9.3 12/07/2023    BUN 8.1 09/12/2023    CR 0.61 12/07/2023    CR 0.56 09/12/2023     (H) 12/07/2023    GLC 55 (L) 12/07/2023     COAGS:   Lab Results   Component Value Date    INR 0.90 09/11/2023     POC: No results found for: \"BGM\", \"HCG\", \"HCGS\"  HEPATIC:   Lab Results   Component Value Date    ALBUMIN 3.9 12/07/2023    PROTTOTAL 7.3 12/07/2023    ALT 22 12/07/2023    AST 29 12/07/2023    ALKPHOS 143 12/07/2023    BILITOTAL 0.2 12/07/2023     OTHER:   Lab Results   Component Value Date    LACT 1.1 12/07/2023    A1C 7.0 (H) 09/12/2023    DANIA 10.3 (H) 12/07/2023    MAG 1.7 12/07/2023    LIPASE 14 09/02/2023    TSH 1.76 09/11/2023    CRP 0.3 02/04/2018       Anesthesia Plan    ASA Status:  3    NPO Status:  NPO Appropriate  " "  Anesthesia Type: General.     - Airway: ETT   Induction: Intravenous.   Maintenance: Balanced.   Techniques and Equipment:     - Airway: Video-Laryngoscope       Consents    Anesthesia Plan(s) and associated risks, benefits, and realistic alternatives discussed. Questions answered and patient/representative(s) expressed understanding.     - Discussed: Risks, Benefits and Alternatives for BOTH SEDATION and the PROCEDURE were discussed     - Discussed with:  Patient            Postoperative Care    Pain management: IV analgesics, Oral pain medications.   PONV prophylaxis: Ondansetron (or other 5HT-3), Dexamethasone or Solumedrol     Comments:    Other Comments: Preop Duoneb.    Patient w/ gastric and sacral stimulators in situ. Patient does not have remotes capable of turning these devices. Discussed with patient and surgical team. Discussed risks including but not limited to interference with use of electrocautery. Patient expressed understanding and wishes to proceed with surgery with devices on.    Complicated allergy list w/ medications. Reports she can tolerate fentanyl despite reported rash. Hx of seizures w/ dilaudid. Tolerates morphine reportedly.           Reymundo Patel MD    I have reviewed the pertinent notes and labs in the chart from the past 30 days and (re)examined the patient.  Any updates or changes from those notes are reflected in this note.              # Obesity: Estimated body mass index is 30.9 kg/m  as calculated from the following:    Height as of this encounter: 1.499 m (4' 11\").    Weight as of this encounter: 69.4 kg (153 lb).      "

## 2024-05-23 NOTE — ANESTHESIA CARE TRANSFER NOTE
Patient: Rukhsana Griffith    Procedure: Procedure(s):  RIGHT LUMBAR 4 - LUMBAR 5 MEDIAL FACETECTOMY DECOMPRESSION       Diagnosis: Lumbar radiculopathy, right [M54.16]  Lumbar stenosis [M48.061]  Diagnosis Additional Information: No value filed.    Anesthesia Type:   General     Note:    Oropharynx: oral airway in place and spontaneously breathing  Level of Consciousness: unresponsive  Oxygen Supplementation: face mask  Level of Supplemental Oxygen (L/min / FiO2): 10  Independent Airway: airway patency not satisfactory and stable  Dentition: dentition unchanged  Vital Signs Stable: post-procedure vital signs reviewed and stable  Report to RN Given: handoff report given  Patient transferred to: PACU    Handoff Report: Identifed the Patient, Identified the Reponsible Provider, Reviewed the pertinent medical history, Discussed the surgical course, Reviewed Intra-OP anesthesia mangement and issues during anesthesia, Set expectations for post-procedure period and Allowed opportunity for questions and acknowledgement of understanding    Vitals:  Vitals Value Taken Time   BP 92/53 05/23/24 1200   Temp 96.3 F 05/23/24 1200   Pulse 82 05/23/24 1203   Resp 11 05/23/24 1203   SpO2 94 % 05/23/24 1203   Vitals shown include unfiled device data.    Electronically Signed By: HUEY Dumont CRNA  May 23, 2024  12:05 PM

## 2024-05-23 NOTE — ANESTHESIA POSTPROCEDURE EVALUATION
Patient: Rukhsana Griffith    Procedure: Procedure(s):  RIGHT LUMBAR 4 - LUMBAR 5 MEDIAL FACETECTOMY DECOMPRESSION       Anesthesia Type:  General    Note:  Disposition: Inpatient   Postop Pain Control: Uneventful            Sign Out: Well controlled pain   PONV: No   Neuro/Psych:             Events: Delayed emergence            Sign Out: Acceptable/Baseline neuro status   Airway/Respiratory: Uneventful            Sign Out: ABNORMAL respiratory status; O2 supplementation               Oxygen: Other (BiPAP)   CV/Hemodynamics: Uneventful            Sign Out: Acceptable CV status; No obvious hypovolemia; No obvious fluid overload   Other NRE: NONE   DID A NON-ROUTINE EVENT OCCUR? YES    Event details/Postop Comments:  Prolonged PACU course due to delayed emergence. BiPAP initiated and about 45 minutes later patient became more responsive and answering question appropriately. Discussed with hospitalist and patient will go to IM.       Last vitals:  Vitals Value Taken Time   /62 05/23/24 1810   Temp 36.6  C (97.8  F) 05/23/24 1330   Pulse 82 05/23/24 1817   Resp 0 05/23/24 1817   SpO2 95 % 05/23/24 1817   Vitals shown include unfiled device data.    Electronically Signed By: Alon Stone MD  May 23, 2024  6:19 PM

## 2024-05-23 NOTE — PROGRESS NOTES
Pt was placed on Bipap after RT discussed critical lab values with MD. Bipap settings 12/6 16 35% with good tidal volumes. Pt is very somnolent and only wakes with painful stimuli. RT will continue to monitor pt.    Cecilia Kasper, RT

## 2024-05-23 NOTE — OR NURSING
After deep stimulus patient able to briefly open eyes and softly state name.  Able to cough and clear secretions independently.

## 2024-05-23 NOTE — PROGRESS NOTES
Pt has remained sedated, yet stable throughout the past two hours of PACU recovery.  Dr. Patel with anesthesia has been at bedside several times to reassess the patient.  Pt is now slowly responding to vigorous stimulation.  Will slowly open eyes and move extremities x 4 with encouragement.  Will continue to monitor patient in PACU and encourage her to be more alert.  Report given to Derek Rangel RN.

## 2024-05-23 NOTE — PROGRESS NOTES
RT checked on Pt . Pt is more awake and letting us know she is hungry. Pt is tolerating Bipap well.    Cecilia Kasper, RT

## 2024-05-23 NOTE — CONSULTS
[facility] MEDICINE CONSULT NOTE   Physician requesting consult: Dylan Alba*    Reason for consult: Postoperative medical management of medical co-morbidities as below    Identification/Summary:   Rukhsana Griffith is a 63 year old female with Diabetes Mellitus, Moderate persistent asthma, Schizoaffective disorder, hypertension, chronic stable hyponatremia, tobacco use has topped for 3 weks as of 5/21, anxiety, hx of seizure disorder, HX LEFT KNEE ARTHROSCOPY, HX TOTAL ABDOMINAL HYSTERECTOMY & BSO, falls, GASTRIC NERVE STIMULATOR placement, gait instability and back pain found to have a synovial cyst. Underwent R L4, L5 decompression on 5/23/2024.    Assessment and Plan:  Acute hypercapneic respiratory failure  COPD exacerbation  Chronic hypoxic respiratory  Moderate persistent asthma   Allergic rhinitis  She used Duoneb tid x 3 days before surgery  She smoked one cigarette one night before surgery after quitting 3 weeks  She attributed her prolonged sedation to melatonin 5mg from the night before  Repeat VBG stat  Continue Bipap for now  Suspect GABRIELLE, outpatient workup recommended  -------  Will continue prednisolone tomorrow for COPD exacerbation, s/p dex today  Continue home Symbicort, Duoneb q6h scheduled and albuterol inhaler prn; Hold Advair, will have patient clarify why she is on both tomorrow  Continue home Nasonex/Flonase  To avoid further respiratory suppression, hold clozapine.    Insomnia  Has clozapine 200mg, diphenhydramine 25mg prn for itching and ramelteon 8mg that could sedate her  Monitor closely  To avoid further respiratory suppression, hold clozapine, ramelteon and diphenhydramine dose tonight. If withdrawing, will reevaluate for possible dose of clozapine    Leukocyte elevation  No normal WBC since 2022 at least, always 12-15 with neutrophil and monocyte elevation mildly  This is likely from stimulator placement? Since she has been asymptomatic and less  "sensitive to that are now, will just keep monitoring.    Hyponatremia  Na 133-135 in the last 4 months    Mild hypercalcemia  Ca 10.5, needs albumin to interpret  Follow up with PCP    Diabetes  Home dose glargine 50u at night. Aspart 20/15/15 for meals.  She did 4u this morning. Was treated with glucose in the OR for glucose of 68.  - Will do 8 for dinner, 20, 15 for breakfast and lunch  - glargine 20u at night    Anticoagulation.  Deferred to surgery      Code status:Full Code   [facility] service was asked to evaluate patient for postoperative medical management as follows below. Please resume the home medications as reconciled and further noted with ordered hold parameters.  Thank you for this consult; we will continue to follow this patient until discharge.    Procedure(s):  RIGHT LUMBAR 4 - LUMBAR 5 MEDIAL FACETECTOMY DECOMPRESSION  Day of Surgery  Estimated Blood Loss:  * No values recorded between 5/23/2024 10:22 AM and 5/23/2024 11:54 AM *  Hospital Problem List   No problem-specific Assessment & Plan notes found for this encounter.    Active Problems:    Degenerative lumbar spinal stenosis      -Reviewed the patient's preoperative H and P and updated missing elements.  -Home medication reconciliation has been reviewed.  Medications have been ordered as noted from the home list and changes are documented above     Clinically Significant Risk Factors Present on Admission                  # Hypertension: Noted on problem list      # Obesity: Estimated body mass index is 30.9 kg/m  as calculated from the following:    Height as of this encounter: 1.499 m (4' 11\").    Weight as of this encounter: 69.4 kg (153 lb).       # Asthma: noted on problem list        HISTORY     Rukhsana Griffith is a 63 year old female with a PMH of CABG, Diabetes Mellitus, Moderate persistent asthma, Schizoaffective disorder, hypertension, chronic stable hyponatremia, tobacco use has topped for 3 weks as of 5/21, anxiety, hx of seizure " disorder, HX LEFT KNEE ARTHROSCOPY, HX TOTAL ABDOMINAL HYSTERECTOMY & BSO, falls, GASTRIC NERVE STIMULATOR placement, gait instability and back pain found to have a synovial cyst. Underwent R L4, L5 decompression on 5/23/2024.    Patient was first seen around 1:20pm in PACU and she was still not responding to voice or gentle touch. I left her to PACU care.    I revisited her at 2:30pm. She is still very drowsy. She moves with touch from nurse. Her glucose was recently checked to be 134.    I revisited her at 3:40pm and talked to anesthesiologist at bedside. Patient has slight improvement from his observation. She moves all extremities and has symmetrical grimace to pain. Bladder was found to be full and she received straight cath. VBG was obtained, turning back with respiratory acidosis so anesthesia put her on Bipap.    I visited her at 6:15pm. Patient woke up with voice and answered my questions appropriately. She reports feeling hungry. She said she took melatonin 5mg the first time last night. She denies any adulterated substance with it. No other symptoms. She says no recent asthma attack. She smoked a cigarette last night after quitting 3 weeks for surgery. She has never been on a Bipap machine. She denies using CPAP at night but has 3-4L oxygen during sleep.    Past Medical History     Past Medical History:  No date: Anemia  No date: Cerebral artery occlusion with cerebral infarction (H)  No date: COPD (chronic obstructive pulmonary disease) (H)  No date: Diabetes (H)  No date: Gastroesophageal reflux disease  No date: History of blood transfusion  No date: HLD (hyperlipidemia)  No date: Hypertension  No date: Methamphetamine abuse (H)  No date: Oxygen dependent  No date: Schizoaffective disorder (H)  No date: Seizures (H)     Patient Active Problem List    Diagnosis Date Noted    Degenerative lumbar spinal stenosis 05/23/2024     Priority: Medium    Episode of loss of consciousness 09/12/2023     Priority:  Medium    Chronic seizure disorder with history of head trauma (H) 09/12/2023     Priority: Medium    Hypoxia 06/24/2023     Priority: Medium    COPD exacerbation (H) 06/04/2023     Priority: Medium    Tension headache 04/13/2023     Priority: Medium    Atypical chest pain 04/13/2023     Priority: Medium    COPD mixed type (H) 12/19/2022     Priority: Medium    Iron deficiency anemia 12/19/2022     Priority: Medium     Formatting of this note might be different from the original.  EGD negative 6/07, heme + 8/08      Right lumbar radiculopathy 12/19/2022     Priority: Medium    History of methamphetamine abuse (H) 07/28/2022     Priority: Medium    Prolonged Q-T interval on ECG 06/16/2022     Priority: Medium    Left homonymous hemianopsia 05/26/2022     Priority: Medium    Retrobulbar neuritis of both eyes 05/26/2022     Priority: Medium    Altered mental status, unspecified 05/14/2022     Priority: Medium    Mixed conductive and sensorineural hearing loss of right ear with restricted hearing of left ear 04/26/2022     Priority: Medium    Otalgia, right 04/26/2022     Priority: Medium    Sensorineural hearing loss (SNHL) of left ear with restricted hearing of right ear 04/26/2022     Priority: Medium    Stress incontinence of urine 09/13/2021     Priority: Medium    Weakness of right foot 09/13/2021     Priority: Medium    Occipital neuralgia of left side 02/07/2020     Priority: Medium    Pelvic and perineal pain 04/02/2019     Priority: Medium    Calcified granuloma of lung (H) 11/19/2018     Priority: Medium     Formatting of this note might be different from the original.  Neg Quant gold 2014, granulomas in liver as well      Perennial allergic rhinitis 06/28/2018     Priority: Medium    Microcytic hypochromic anemia 02/04/2018     Priority: Medium    Delayed gastric emptying 02/04/2018     Priority: Medium    Abdominal pain, generalized 02/04/2018     Priority: Medium    Hypoglycemia 02/04/2018     Priority:  Medium    Psychophysiological insomnia 11/10/2017     Priority: Medium    Chest pain 09/03/2017     Priority: Medium    Personality disorder (H) 08/23/2016     Priority: Medium    Spasm 07/21/2016     Priority: Medium    Tobacco abuse 06/30/2016     Priority: Medium    Vitamin D deficiency 06/14/2016     Priority: Medium    Increased frequency of urination 01/26/2016     Priority: Medium    Overactive bladder 08/03/2015     Priority: Medium    Unspecified visual disturbance 10/24/2014     Priority: Medium     Formatting of this note might be different from the original.   Side effect of meds.      Peripheral neuropathy 05/09/2014     Priority: Medium    Diabetic gastroparesis (H) 09/23/2013     Priority: Medium     Formatting of this note might be different from the original.  S/p placement of gastric neurostimulator      Anxiety 08/14/2013     Priority: Medium    Unilateral weakness 09/15/2012     Priority: Medium    Hypertension 09/06/2012     Priority: Medium    Dyslipidemia, goal LDL below 100 01/02/2012     Priority: Medium    Seizure disorder, grand mal (H) 07/23/2011     Priority: Medium    Chronic hyponatremia 01/21/2011     Priority: Medium    Asthma 10/17/2010     Priority: Medium     Formatting of this note might be different from the original.  Per review of hx appears mild to moderate persistent.  On advair      Irritable bowel syndrome 10/17/2010     Priority: Medium     Formatting of this note might be different from the original.  Chronic LLQ abdominal pain with frequent diarrhea/constipation, nausea/vomiting, and history of hemorrhoids.  Blood in her stool 3-4 times/mo. at baseline.  Chronic inflammation seen of colon from transverse to descending/signmoid.  Colonoscopy in 8/2009 was normal with multiple biopsies taken.      Neutrophilic leukocytosis 10/17/2010     Priority: Medium     Formatting of this note might be different from the original.  Peripheral smears (7/2-1- and 7/2008) confirm iron  deficiency anemia (2010)+ likely reactive leukocytosis.  RANDA-2 negative, on clozapine (risk of leukopenia)      Migraine 02/19/2010     Priority: Medium    Osteoporosis 02/19/2010     Priority: Medium    Schizoaffective disorder, chronic condition (H) 09/14/2007     Priority: Medium     Formatting of this note might be different from the original.      Type II diabetes mellitus with complication (H) 09/14/2007     Priority: Medium     Formatting of this note might be different from the original.  + neuropathy (EMG done 4/2010) and nephropathy.  Follows with Dr. Huffman.  Proteinuria present  Hgb AIC 7.9% Apr 2012  No DR eye exam 8/15; Dr. Rehman, [facility]       Surgical History     Past Surgical History:   Procedure Laterality Date    HYSTERECTOMY      KNEE SURGERY      OTHER SURGICAL HISTORY      gastric pacemaker    OTHER SURGICAL HISTORY      interstim placement    CA REVISE/REMOVE PERIPH/GASTRIC NEUROSTIM/ N/A 5/15/2019    Procedure: PARTIAL REMOVAL OLD LEAD;  Surgeon: Jcarlos Muñiz MD;  Location: [facility] OR;  Service: Urology    RELEASE CARPAL TUNNEL       Family History      Family History   Adopted: Yes      Social History      Social History     Tobacco Use    Smoking status: Every Day     Current packs/day: 0.25     Types: Cigarettes    Smokeless tobacco: Never   Substance Use Topics    Alcohol use: Not Currently    Drug use: Not Currently      Allergies     Allergies   Allergen Reactions    Aspirin Unknown     Other reaction(s): irritation for stomach takes 81 at home with milk     Bee Sting Kit [Bee Venom] Anaphylaxis    Botox [Onabotulinumtoxina] Hives and Shortness Of Breath    Garlic Hives    Hydrocodone Headache, Hives and Itching    Hydromorphone Other (See Comments) and Rash     Seizures    Hydroxyzine Hives, Other (See Comments), Anaphylaxis and Shortness Of Breath     Dyspnea    Hymenoptera Allergenic Extract [Wasp Venom Protein] Anaphylaxis    Iodinated Contrast  "Media Hives, Other (See Comments), Itching and Swelling     EDEMA    Latex Shortness Of Breath    Lidocaine Hives     lidoderm patch only; tolerates injectable lidocaine    Onion Hives     green, red, yellow, mushrooms, garlic---all cause pt to have hives    Oxycodone Hives and Rash    Procaine Hives     tolerates injectable bupivacaine and lidocaine      Sucralose Anaphylaxis    Diazepam Rash     also: parasomnia, as pt reported waking up in bathtub    Haloperidol Rash    Ketorolac Rash    Lithium Rash    Meperidine Rash    Diatrizoate Meglumine [Diatrizoate] Hives and Swelling    Green Pepper [Capsicum] Hives    Metformin Other (See Comments)     \"Stomach bleeding and kidney infection\"    Adhesive Tape-Silicones [Adhesive Tape] Rash    [place] [Nuts] Rash    Cephalosporins Rash    Chlorpromazine Rash     THORAZINE    Chlorpropamide Rash    Coconut (Cocos Nucifera) Rash    Codeine Headache     Migraines       Darvocet A500 [Propoxyphene N-Apap] Rash    Dipyridamole Rash    Fentanyl Rash    Fluoxetine Rash    Furosemide Rash    Glyburide Other (See Comments) and Rash     Other reaction(s): hypoglycemia (patient is very sensitive to sulfonylureas)    Ondansetron Rash    Penicillins Rash and Other (See Comments)     Migraines, dizzy and sweating    Percocet [Oxycodone-Acetaminophen] Rash    Shellfish Containing Products [Shellfish-Derived Products] Rash    Sulfa (Sulfonamide Antibiotics) [Sulfa Antibiotics] Rash    Thallium-201 [Thallous Chloride Tl 201] Rash    Thioridazine Rash    Tizanidine Rash       Prior to Admission Medications      Prior to Admission Medications   Prescriptions Last Dose Informant Patient Reported? Taking?   EPINEPHrine (EPIPEN) 0.3 mg/0.3 mL atIn Unknown  Yes Yes   Sig: [EPINEPHRINE (EPIPEN) 0.3 MG/0.3 ML ATIN] Inject 0.3 mg into the shoulder, thigh, or buttocks as needed.   SUMAtriptan (IMITREX) 100 MG tablet 5/9/2024  Yes Yes   Sig: [SUMATRIPTAN (IMITREX) 100 MG TABLET] Take 100 mg by mouth " every 2 (two) hours as needed for migraine.   acetaminophen (TYLENOL) 160 mg/5 mL (5 mL) Soln solution 5/22/2024  Yes Yes   Sig: [ACETAMINOPHEN (TYLENOL) 160 MG/5 ML (5 ML) SOLN SOLUTION] Take 10 mL by mouth every 4 (four) hours as needed (Pain).          albuterol (PROAIR HFA;PROVENTIL HFA;VENTOLIN HFA) 90 mcg/actuation inhaler 5/23/2024 at am  Yes Yes   Sig: [ALBUTEROL (PROAIR HFA;PROVENTIL HFA;VENTOLIN HFA) 90 MCG/ACTUATION INHALER] Inhale 2 puffs every 6 (six) hours as needed for wheezing.   albuterol (PROVENTIL) 2.5 mg /3 mL (0.083 %) nebulizer solution 5/23/2024 at am  Yes Yes   Sig: [ALBUTEROL (PROVENTIL) 2.5 MG /3 ML (0.083 %) NEBULIZER SOLUTION] Take 2.5 mg by nebulization every 4 (four) hours as needed for wheezing.          aluminum-magnesium hydroxide-simethicone (MAALOX MAX STRENGTH) 400-400-40 mg/5 mL suspension 5/22/2024  Yes Yes   Sig: [ALUMINUM-MAGNESIUM HYDROXIDE-SIMETHICONE (MAALOX MAX STRENGTH) 400-400-40 MG/5 ML SUSPENSION] Take 5 mL by mouth every 4 (four) hours as needed for indigestion.          ammonium lactate (AMLACTIN) 12 % cream 5/22/2024  Yes Yes   Sig: [AMMONIUM LACTATE (AMLACTIN) 12 % CREAM] Apply 1 application topically 2 (two) times a day.          atropine 1 % ophthalmic solution 5/22/2024  Yes Yes   Sig: Place 1-2 drops under the tongue 2 times daily as needed for secretions   budesonide-formoterol (SYMBICORT) 160-4.5 mcg/actuation inhaler 5/23/2024 at am  Yes Yes   Sig: [BUDESONIDE-FORMOTEROL (SYMBICORT) 160-4.5 MCG/ACTUATION INHALER] Inhale 2 puffs 2 (two) times a day.          cholecalciferol, vitamin D3, 2,000 unit Tab 5/22/2024  Yes Yes   Sig: [CHOLECALCIFEROL, VITAMIN D3, 2,000 UNIT TAB] Take 2,000 Units by mouth daily.   cloZAPine (CLOZARIL) 100 MG tablet 5/22/2024 at hs  Yes Yes   Sig: [CLOZAPINE (CLOZARIL) 100 MG TABLET] Take 200 mg by mouth at bedtime.          diphenhydrAMINE (BENADRYL) 25 mg capsule 5/22/2024  Yes Yes   Sig: [DIPHENHYDRAMINE (BENADRYL) 25 MG CAPSULE]  Take 25 mg by mouth at bedtime as needed for itching.   docusate sodium (COLACE) 100 MG capsule 5/23/2024 at am  Yes Yes   Sig: [DOCUSATE SODIUM (COLACE) 100 MG CAPSULE] Take 100 mg by mouth 2 (two) times a day.          escitalopram oxalate (LEXAPRO) 10 MG tablet 5/23/2024 at am  Yes Yes   Sig: Take 15 mg by mouth daily   fluoride, sodium, (DENTAGEL) 1.1 % Gel dental gel 5/22/2024  Yes Yes   Sig: [FLUORIDE, SODIUM, (DENTAGEL) 1.1 % GEL DENTAL GEL] Apply 1 application to teeth at bedtime.   fluticasone (FLONASE) 50 MCG/ACT nasal spray 5/23/2024 at am  Yes Yes   Sig: Spray 1 spray into both nostrils daily   fluticasone-salmeterol (ADVAIR) 500-50 MCG/ACT inhaler 5/23/2024 at am  Yes Yes   Sig: Inhale 1 puff into the lungs 2 times daily   gabapentin (NEURONTIN) 100 MG capsule 5/23/2024 at am  Yes Yes   Sig: Take 100 mg by mouth 2 times daily (before meals) 100mg in morning, 100mg with lunch, 300mg at HS   gabapentin (NEURONTIN) 300 MG capsule 5/22/2024 at hs  Yes Yes   Sig: Take 300 mg by mouth at bedtime 100mg in morning, 100mg with lunch, 300mg at HS   glucagon, human recombinant, (GLUCAGEN HYPOKIT) 1 mg injection Unknown  Yes Yes   Sig: [GLUCAGON, HUMAN RECOMBINANT, (GLUCAGEN HYPOKIT) 1 MG INJECTION] Infuse 1 mg into a venous catheter once as needed (Low blood sugars).          insulin aspart (NOVOLOG FLEXPEN) 100 UNIT/ML pen 5/22/2024  Yes Yes   Sig: Inject 15 Units Subcutaneous daily (before lunch)   insulin aspart (NOVOLOG PEN) 100 UNIT/ML pen 5/22/2024  Yes Yes   Sig: Inject 15 Units Subcutaneous daily (with dinner)   insulin aspart U-100 (NOVOLOG) 100 unit/mL injection 5/23/2024 at (4 units)  Yes Yes   Sig: Inject 20 Units Subcutaneous daily before breakfast   insulin glargine (LANTUS) 100 unit/mL injection 5/22/2024 at hs  Yes Yes   Sig: Inject 50 Units Subcutaneous at bedtime   ipratropium-albuterol (DUO-NEB) 0.5-2.5 mg/3 mL nebulizer 5/23/2024 at am  Yes Yes   Sig: [IPRATROPIUM-ALBUTEROL (DUO-NEB) 0.5-2.5  MG/3 ML NEBULIZER] Take 3 mL by nebulization 4 (four) times a day as needed.          lisinopril (PRINIVIL,ZESTRIL) 10 MG tablet 5/22/2024  Yes Yes   Sig: [LISINOPRIL (PRINIVIL,ZESTRIL) 10 MG TABLET] Take 10 mg by mouth daily.   loratadine (CLARITIN) 10 mg tablet 5/22/2024  Yes Yes   Sig: [LORATADINE (CLARITIN) 10 MG TABLET] Take 10 mg by mouth daily.   lubiprostone (AMITIZA) 24 MCG capsule 5/22/2024  Yes Yes   Sig: [LUBIPROSTONE (AMITIZA) 24 MCG CAPSULE] Take 24 mcg by mouth 2 (two) times a day.   magnesium hydroxide (MILK OF MAG) 400 mg/5 mL Susp suspension 5/22/2024  Yes Yes   Sig: [MAGNESIUM HYDROXIDE (MILK OF MAG) 400 MG/5 ML SUSP SUSPENSION] Take 30 mL by mouth 2 (two) times a day as needed.          mometasone (NASONEX) 50 mcg/actuation nasal spray 5/23/2024 at am  Yes Yes   Sig: [MOMETASONE (NASONEX) 50 MCG/ACTUATION NASAL SPRAY] 2 sprays into each nostril daily.   multivitamin with minerals (THERA-M) 9 mg iron-400 mcg Tab tablet 5/22/2024 at am  Yes Yes   Sig: [MULTIVITAMIN WITH MINERALS (THERA-M) 9 MG IRON-400 MCG TAB TABLET] Take 1 tablet by mouth daily.   nicotine (NICODERM CQ) 21 mg/24 hr 5/23/2024 at (removed pre op)  Yes Yes   Sig: [NICOTINE (NICODERM CQ) 21 MG/24 HR] Place 1 patch on the skin daily as needed for smoking cessation.          omeprazole (PRILOSEC) 20 MG DR capsule 5/23/2024 at am  Yes Yes   Sig: Take 20 mg by mouth daily   polyethylene glycol (MIRALAX) 17 gram packet 5/23/2024 at am  Yes Yes   Sig: [POLYETHYLENE GLYCOL (MIRALAX) 17 GRAM PACKET] Take 17 g by mouth 2 (two) times a day.          pravastatin (PRAVACHOL) 40 MG tablet 5/23/2024 at am  Yes Yes   Sig: [PRAVASTATIN (PRAVACHOL) 40 MG TABLET] Take 40 mg by mouth every morning.          ramelteon (ROZEREM) 8 mg tablet 5/22/2024 at hs  Yes Yes   Sig: [RAMELTEON (ROZEREM) 8 MG TABLET] Take 8 mg by mouth at bedtime.   topiramate (TOPAMAX) 200 MG tablet 5/23/2024 at am  Yes Yes   Sig: [TOPIRAMATE (TOPAMAX) 200 MG TABLET] Take 200 mg by  mouth 2 (two) times a day.   traMADol (ULTRAM) 50 MG tablet 5/22/2024  Yes Yes   Sig: Take 50 mg by mouth every 6 hours as needed for severe pain   triamcinolone (KENALOG) 0.1 % ointment   Yes Yes   Sig: [TRIAMCINOLONE (KENALOG) 0.1 % OINTMENT] Apply 1 application topically 2 (two) times a day.      Facility-Administered Medications: None      Review of Systems     A 12 point comprehensive review of systems was negative except as noted above in HPI.    OBJECTIVE         Physical Exam   Temp:  [96.3  F (35.7  C)-98.2  F (36.8  C)] 98.1  F (36.7  C)  Pulse:  [79-84] 79  Resp:  [12-18] 18  BP: ()/(51-65) 106/53  SpO2:  [94 %-99 %] 98 %  Body mass index is 30.9 kg/m .  General Appearance: Alert and wake, not in distress  Respiratory: diminished breath sound  Cardiovascular: rhythmic, normal S1 and S2, no murmur  Neurology: oriented x 3  Psych: cooperative and calm, normal affect    I reviewed patient's preoperative evaluation note from Dr. Kei De Leon on 5/21/2024.  I reviewed patient's preoperative lab tests on 5/21 CBC and 5/3 BMP.    I talked to anesthesiologist for her care today.    Thank you for this consultation.  Appreciate the opportunity to participate in the care of Rukhsana Griffith, please feel free to contact us for any questions or concerns.    HOMA QUIROGA MD  [facility]  Phone: #[phone]

## 2024-05-24 ENCOUNTER — APPOINTMENT (OUTPATIENT)
Dept: PHYSICAL THERAPY | Facility: CLINIC | Age: 64
End: 2024-05-24
Attending: PHYSICIAN ASSISTANT
Payer: MEDICAID

## 2024-05-24 ENCOUNTER — APPOINTMENT (OUTPATIENT)
Dept: OCCUPATIONAL THERAPY | Facility: CLINIC | Age: 64
End: 2024-05-24
Attending: PHYSICIAN ASSISTANT
Payer: MEDICAID

## 2024-05-24 PROBLEM — M48.062 SPINAL STENOSIS OF LUMBAR REGION WITH NEUROGENIC CLAUDICATION: Status: ACTIVE | Noted: 2024-05-24

## 2024-05-24 LAB
BASE EXCESS BLDV CALC-SCNC: 1.2 MMOL/L (ref -3–3)
GLUCOSE BLDC GLUCOMTR-MCNC: 121 MG/DL (ref 70–99)
GLUCOSE BLDC GLUCOMTR-MCNC: 198 MG/DL (ref 70–99)
GLUCOSE BLDC GLUCOMTR-MCNC: 327 MG/DL (ref 70–99)
GLUCOSE BLDC GLUCOMTR-MCNC: 95 MG/DL (ref 70–99)
HCO3 BLDV-SCNC: 28 MMOL/L (ref 21–28)
O2/TOTAL GAS SETTING VFR VENT: 36 %
OXYHGB MFR BLDV: 78 % (ref 70–75)
PCO2 BLDV: 61 MM HG (ref 40–50)
PH BLDV: 7.27 [PH] (ref 7.32–7.43)
PO2 BLDV: 45 MM HG (ref 25–47)
SAO2 % BLDV: 78.9 % (ref 70–75)

## 2024-05-24 PROCEDURE — 99232 SBSQ HOSP IP/OBS MODERATE 35: CPT | Performed by: NURSE PRACTITIONER

## 2024-05-24 PROCEDURE — 94640 AIRWAY INHALATION TREATMENT: CPT | Mod: 76

## 2024-05-24 PROCEDURE — 97116 GAIT TRAINING THERAPY: CPT | Mod: GP

## 2024-05-24 PROCEDURE — 999N000157 HC STATISTIC RCP TIME EA 10 MIN

## 2024-05-24 PROCEDURE — 97166 OT EVAL MOD COMPLEX 45 MIN: CPT | Mod: GO

## 2024-05-24 PROCEDURE — 97535 SELF CARE MNGMENT TRAINING: CPT | Mod: GO

## 2024-05-24 PROCEDURE — 250N000009 HC RX 250: Performed by: STUDENT IN AN ORGANIZED HEALTH CARE EDUCATION/TRAINING PROGRAM

## 2024-05-24 PROCEDURE — 120N000004 HC R&B MS OVERFLOW

## 2024-05-24 PROCEDURE — 250N000011 HC RX IP 250 OP 636: Performed by: HOSPITALIST

## 2024-05-24 PROCEDURE — 82962 GLUCOSE BLOOD TEST: CPT

## 2024-05-24 PROCEDURE — 250N000013 HC RX MED GY IP 250 OP 250 PS 637: Performed by: NURSE PRACTITIONER

## 2024-05-24 PROCEDURE — 250N000011 HC RX IP 250 OP 636: Performed by: PHYSICIAN ASSISTANT

## 2024-05-24 PROCEDURE — 97161 PT EVAL LOW COMPLEX 20 MIN: CPT | Mod: GP

## 2024-05-24 PROCEDURE — 250N000013 HC RX MED GY IP 250 OP 250 PS 637: Performed by: STUDENT IN AN ORGANIZED HEALTH CARE EDUCATION/TRAINING PROGRAM

## 2024-05-24 PROCEDURE — 82805 BLOOD GASES W/O2 SATURATION: CPT | Performed by: STUDENT IN AN ORGANIZED HEALTH CARE EDUCATION/TRAINING PROGRAM

## 2024-05-24 PROCEDURE — 97530 THERAPEUTIC ACTIVITIES: CPT | Mod: GP

## 2024-05-24 PROCEDURE — 250N000012 HC RX MED GY IP 250 OP 636 PS 637: Performed by: STUDENT IN AN ORGANIZED HEALTH CARE EDUCATION/TRAINING PROGRAM

## 2024-05-24 PROCEDURE — 99232 SBSQ HOSP IP/OBS MODERATE 35: CPT | Performed by: INTERNAL MEDICINE

## 2024-05-24 PROCEDURE — 250N000013 HC RX MED GY IP 250 OP 250 PS 637: Performed by: PHYSICIAN ASSISTANT

## 2024-05-24 PROCEDURE — 94660 CPAP INITIATION&MGMT: CPT

## 2024-05-24 PROCEDURE — 250N000011 HC RX IP 250 OP 636: Performed by: NURSE PRACTITIONER

## 2024-05-24 PROCEDURE — 94640 AIRWAY INHALATION TREATMENT: CPT

## 2024-05-24 RX ORDER — MORPHINE SULFATE 15 MG/1
7.5 TABLET ORAL EVERY 4 HOURS PRN
Status: DISCONTINUED | OUTPATIENT
Start: 2024-05-24 | End: 2024-05-24

## 2024-05-24 RX ORDER — SODIUM CHLORIDE 9 MG/ML
INJECTION, SOLUTION INTRAVENOUS CONTINUOUS
Status: DISCONTINUED | OUTPATIENT
Start: 2024-05-24 | End: 2024-05-24

## 2024-05-24 RX ORDER — METHOCARBAMOL 500 MG/1
500 TABLET, FILM COATED ORAL 4 TIMES DAILY
Status: DISCONTINUED | OUTPATIENT
Start: 2024-05-24 | End: 2024-05-25 | Stop reason: HOSPADM

## 2024-05-24 RX ORDER — MORPHINE SULFATE 2 MG/ML
2 INJECTION, SOLUTION INTRAMUSCULAR; INTRAVENOUS ONCE
Status: COMPLETED | OUTPATIENT
Start: 2024-05-24 | End: 2024-05-24

## 2024-05-24 RX ORDER — MORPHINE SULFATE 2 MG/ML
1 INJECTION, SOLUTION INTRAMUSCULAR; INTRAVENOUS EVERY 6 HOURS PRN
Status: DISCONTINUED | OUTPATIENT
Start: 2024-05-24 | End: 2024-05-25 | Stop reason: HOSPADM

## 2024-05-24 RX ORDER — ACETAMINOPHEN 325 MG/1
975 TABLET ORAL EVERY 8 HOURS
Status: DISCONTINUED | OUTPATIENT
Start: 2024-05-24 | End: 2024-05-24

## 2024-05-24 RX ORDER — MORPHINE SULFATE 2 MG/ML
2 INJECTION, SOLUTION INTRAMUSCULAR; INTRAVENOUS EVERY 6 HOURS PRN
Status: DISCONTINUED | OUTPATIENT
Start: 2024-05-24 | End: 2024-05-25 | Stop reason: HOSPADM

## 2024-05-24 RX ORDER — GABAPENTIN 100 MG/1
100 CAPSULE ORAL 2 TIMES DAILY
Status: DISCONTINUED | OUTPATIENT
Start: 2024-05-24 | End: 2024-05-25 | Stop reason: HOSPADM

## 2024-05-24 RX ORDER — ACETAMINOPHEN 325 MG/1
650 TABLET ORAL EVERY 4 HOURS PRN
Status: DISCONTINUED | OUTPATIENT
Start: 2024-05-27 | End: 2024-05-24

## 2024-05-24 RX ORDER — MORPHINE SULFATE 15 MG/1
7.5-15 TABLET ORAL EVERY 4 HOURS PRN
Status: DISCONTINUED | OUTPATIENT
Start: 2024-05-24 | End: 2024-05-25 | Stop reason: HOSPADM

## 2024-05-24 RX ADMIN — MORPHINE SULFATE 2 MG: 2 INJECTION, SOLUTION INTRAMUSCULAR; INTRAVENOUS at 14:13

## 2024-05-24 RX ADMIN — SENNOSIDES AND DOCUSATE SODIUM 1 TABLET: 8.6; 5 TABLET ORAL at 20:56

## 2024-05-24 RX ADMIN — TOPIRAMATE 200 MG: 100 TABLET ORAL at 09:05

## 2024-05-24 RX ADMIN — IPRATROPIUM BROMIDE AND ALBUTEROL SULFATE 3 ML: .5; 3 SOLUTION RESPIRATORY (INHALATION) at 01:32

## 2024-05-24 RX ADMIN — POLYETHYLENE GLYCOL 3350 17 G: 17 POWDER, FOR SOLUTION ORAL at 20:56

## 2024-05-24 RX ADMIN — MORPHINE SULFATE 2 MG: 2 INJECTION, SOLUTION INTRAMUSCULAR; INTRAVENOUS at 00:53

## 2024-05-24 RX ADMIN — LISINOPRIL 10 MG: 10 TABLET ORAL at 09:05

## 2024-05-24 RX ADMIN — NICOTINE 1 PATCH: 21 PATCH, EXTENDED RELEASE TRANSDERMAL at 09:18

## 2024-05-24 RX ADMIN — MORPHINE SULFATE 2 MG: 4 INJECTION, SOLUTION INTRAMUSCULAR; INTRAVENOUS at 02:06

## 2024-05-24 RX ADMIN — MORPHINE SULFATE 15 MG: 15 TABLET ORAL at 17:57

## 2024-05-24 RX ADMIN — SENNOSIDES AND DOCUSATE SODIUM 1 TABLET: 8.6; 5 TABLET ORAL at 09:03

## 2024-05-24 RX ADMIN — PREDNISONE 40 MG: 20 TABLET ORAL at 09:04

## 2024-05-24 RX ADMIN — LUBIPROSTONE 24 MCG: 8 CAPSULE, GELATIN COATED ORAL at 20:55

## 2024-05-24 RX ADMIN — METHOCARBAMOL 500 MG: 500 TABLET ORAL at 20:56

## 2024-05-24 RX ADMIN — ACETAMINOPHEN 975 MG: 325 TABLET ORAL at 04:09

## 2024-05-24 RX ADMIN — IPRATROPIUM BROMIDE AND ALBUTEROL SULFATE 3 ML: .5; 3 SOLUTION RESPIRATORY (INHALATION) at 07:51

## 2024-05-24 RX ADMIN — MORPHINE SULFATE 2 MG: 4 INJECTION, SOLUTION INTRAMUSCULAR; INTRAVENOUS at 04:11

## 2024-05-24 RX ADMIN — CLINDAMYCIN PHOSPHATE 600 MG: 600 INJECTION, SOLUTION INTRAVENOUS at 04:08

## 2024-05-24 RX ADMIN — TOPIRAMATE 200 MG: 100 TABLET ORAL at 20:57

## 2024-05-24 RX ADMIN — INSULIN ASPART 12 UNITS: 100 INJECTION, SOLUTION INTRAVENOUS; SUBCUTANEOUS at 11:41

## 2024-05-24 RX ADMIN — POLYETHYLENE GLYCOL 3350 17 G: 17 POWDER, FOR SOLUTION ORAL at 09:06

## 2024-05-24 RX ADMIN — PANTOPRAZOLE SODIUM 40 MG: 40 TABLET, DELAYED RELEASE ORAL at 09:03

## 2024-05-24 RX ADMIN — IPRATROPIUM BROMIDE AND ALBUTEROL SULFATE 3 ML: .5; 3 SOLUTION RESPIRATORY (INHALATION) at 13:54

## 2024-05-24 RX ADMIN — CLINDAMYCIN PHOSPHATE 600 MG: 600 INJECTION, SOLUTION INTRAVENOUS at 11:40

## 2024-05-24 RX ADMIN — FLUTICASONE FUROATE AND VILANTEROL TRIFENATATE 1 PUFF: 200; 25 POWDER RESPIRATORY (INHALATION) at 09:03

## 2024-05-24 RX ADMIN — GABAPENTIN 100 MG: 100 CAPSULE ORAL at 09:04

## 2024-05-24 RX ADMIN — IPRATROPIUM BROMIDE AND ALBUTEROL SULFATE 3 ML: .5; 3 SOLUTION RESPIRATORY (INHALATION) at 21:13

## 2024-05-24 RX ADMIN — PRAVASTATIN SODIUM 40 MG: 20 TABLET ORAL at 09:04

## 2024-05-24 RX ADMIN — METHOCARBAMOL 500 MG: 500 TABLET ORAL at 17:57

## 2024-05-24 RX ADMIN — ESCITALOPRAM OXALATE 15 MG: 5 TABLET, FILM COATED ORAL at 09:05

## 2024-05-24 RX ADMIN — MORPHINE SULFATE 7.5 MG: 15 TABLET ORAL at 11:40

## 2024-05-24 RX ADMIN — ACETAMINOPHEN 975 MG: 325 TABLET ORAL at 20:56

## 2024-05-24 RX ADMIN — INSULIN GLARGINE 20 UNITS: 100 INJECTION, SOLUTION SUBCUTANEOUS at 21:07

## 2024-05-24 RX ADMIN — GABAPENTIN 100 MG: 100 CAPSULE ORAL at 20:56

## 2024-05-24 RX ADMIN — LUBIPROSTONE 24 MCG: 8 CAPSULE, GELATIN COATED ORAL at 09:06

## 2024-05-24 RX ADMIN — FLUTICASONE PROPIONATE 1 SPRAY: 50 SPRAY, METERED NASAL at 09:12

## 2024-05-24 RX ADMIN — LORATADINE 10 MG: 10 TABLET ORAL at 09:04

## 2024-05-24 ASSESSMENT — ACTIVITIES OF DAILY LIVING (ADL)
ADLS_ACUITY_SCORE: 32
ADLS_ACUITY_SCORE: 37
ADLS_ACUITY_SCORE: 32
ADLS_ACUITY_SCORE: 37
ADLS_ACUITY_SCORE: 45
ADLS_ACUITY_SCORE: 37
ADLS_ACUITY_SCORE: 32
ADLS_ACUITY_SCORE: 32
DEPENDENT_IADLS:: INDEPENDENT
ADLS_ACUITY_SCORE: 37
ADLS_ACUITY_SCORE: 24
ADLS_ACUITY_SCORE: 32
ADLS_ACUITY_SCORE: 24
ADLS_ACUITY_SCORE: 45
ADLS_ACUITY_SCORE: 37
ADLS_ACUITY_SCORE: 32
ADLS_ACUITY_SCORE: 32

## 2024-05-24 NOTE — UTILIZATION REVIEW
Admission Status; Secondary Review Determination   Under the authority of the Utilization Management Committee, the utilization review process indicated a secondary review on Rukhsana Griffith. The review outcome is based on review of the medical records, discussions with staff, and applying clinical experience noted on the date of the review.   (x) Inpatient Status Appropriate - This patient's medical care is consistent with medical management for inpatient care and reasonable inpatient medical practice.     RATIONALE FOR DETERMINATION   Rukhsana Griffith is a 63 yr old female with DM2 on insulin, HTN on one med, COPD who presented for right L4-5 right laminotomy with medial facetectomy and decompression.  Postop course complicated with COPD exacerbation and acute hypercapneic respiratory failure in setting of moderate persistent asthma as well.  Required BiPAP overnight.  Now on NC but having nausea and vomiting.  Pain management being addressed by pain team.  Has a drain in place that is not able to be removed at this time.  Ongoing medical needs to cross second night.    At the time of admission with the information available to the attending physician more than 2 nights Hospital complex care was anticipated, based on patient risk of adverse outcome if treated as outpatient and complex care required. Inpatient admission is appropriate based on the Medicare guidelines.   The information on this document is developed by the utilization review team in order for the business office to ensure compliance. This only denotes the appropriateness of proper admission status and does not reflect the quality of care rendered.   The definitions of Inpatient Status and Observation Status used in making the determination above are those provided in the CMS Coverage Manual, Chapter 1 and Chapter 6, section 70.4.   Sincerely,   Yuliya Scherer MD  Utilization Review  Physician Advisor  [facility]

## 2024-05-24 NOTE — PROGRESS NOTES
Patient was placed on Bipap post operatively at 1703 yesterday due to being somnolent.  She remained on the Bipap with settings of 12/6, back rate of 16 bpm, and 35% FiO2 until 0206.  She is currently on a 6 lpm open oxymask.  She refused to wear the Bipap any longer.  Breath sounds were diminished with wheezing.  Will continue with scheduled Duonebs every six hours for patient's history of COPD and wheezing.  Will closely monitor patient's respiratory status and encourage use of Bipap if she becomes more lethargic again.

## 2024-05-24 NOTE — PROCEDURES
PREOPERATIVE DIAGNOSIS:  1. Right L4-5 lateral recess stenosis.   2. Failure of conservative management.     POSTOPERATIVE DIAGNOSIS:  1. Right L4-5 lateral recess stenosis.   2. Failure of conservative management.       PROCEDURE:  1. Right L4-5 laminotomy, medial facetectomy and decompression.       SURGEON:  Dylan Alba M.D.    ASSISTANT:  Cholo Rodriguez PA-C assist required for patient positioning, soft tissue retraction, instrumentation assist, and patient safety.     ESTIMATED BLOOD LOSS:  5 cc.     COMPLICATIONS:  None apparent.     INTRAOPERATIVE FINDINGS:  Right L4-5 lateral recess stenosis.     INDICATIONS FOR OPERATION:  The patient is a very pleasant female  who came to the clinic with a long-standing history of low back and right lower extremity pain consistent with imaging studies showing a right-sided L4-5 lateral recess stenosis. She had exhaustive nonoperative measures and continued to have debilitating symptoms. Therefore, I had a discussion with her regarding surgery and specifically discussed the risks of surgery, including, but not limited to death, infection, dural tear, nerve injury and nonresolution of symptoms among others and she accepted and wished to proceed.     DESCRIPTION OF PROCEDURE:  The patient was brought to the operating room and placed under general endotracheal anesthetic without complications. IV antibiotics were given intravenously within one hour of incision. She was then placed prone on a Jorge table ensuring that all nerve areas and bony areas were well padded and free of pressure. The low back area was then prepped and draped in the routine sterile manner. After appropriate time out, I placed a marking needle into the skin and subcutaneous tissues and took intraoperative imaging to plan my incision. Thereafter, I made an incision over the L4 and L5 spinous processes in the midline and carried it down onto the fascia and subperiosteally on the left side to expos  the interlaminar space. Intraoperative imaging confirmed that I was at the correct level. I then used a bur to nyla down the medial facet and facet of L4 and used a Kerrison rongeur to perform a laminotomy and removed the intervening ligamentum flavum. I then performed a medial facetectomy all the way to the medial border of the L4 and L5 pedicles, decompressing the traversing L5 nerve.  I then performed copious irrigation and hemostasis. I performed layer by layer closure utilizing Vicryl 0 suture for the fascia over a drain,  2-0 for the subcutaneous tissues and 3-0 for the skin. 0.5% Marcaine with epinephrine was injected into the skin and subcutaneous tissues. I then placed Steristrips. Sterile dressing was then applied. The patient was then placed supine in her bed and extubated without complications and brought to the recovery room in satisfactory condition.     POSTOPERATIVE PLAN:  The patient is to mobilize as tolerated. No bending, twisting or lifting greater than 10 pounds for the next six weeks. After discharge the patient will be seen back in clinic in six weeks without repeat x-rays at that time.

## 2024-05-24 NOTE — PROGRESS NOTES
Physical Therapy Discharge Summary    Reason for therapy discharge:    All goals and outcomes met, no further needs identified.    Progress towards therapy goal(s). See goals on Care Plan in [facility] electronic health record for goal details.  Goals met    Therapy recommendation(s):    Continue home exercise program.

## 2024-05-24 NOTE — PROGRESS NOTES
[facility] ACUTE PAIN SERVICE    [facility], [facility], [facility], [facility], [place]   PAIN Progress Note    Assessment/Plan:  Rukhsana Griffith is a 63 year old female who was admitted on 5/23/2024.  Pain team was asked to see the patient for operative pain, multiple allergies. Admitted for planned procedure. History of diabetes mellitus type 2, schizoaffective disorder, migraine, osteoporosis, irritable bowel syndrome, asthma, hyponatremia, seizures, dyslipidemia, hypertension, COPD, anxiety, diabetic gastroparesis, peripheral neuropathy, GI bleed, tobacco abuse, personality disorder, hypokalemia.      Post op day: Day 1. Right L4-5 Lumbar decompression    Of note patient has 44 allergies listed in her chart.  Some but not all allergies include aspirin, hydrocodone (headache, hives, itching), hydromorphone (rash, seizures), hydroxyzine (anaphylaxis, shortness of breath, dyspnea), lidocaine patch but tolerates injectable lidocaine, oxycodone (hives, rash), diazepam (or rash, parasomnia), Haldol (rash), Toradol (rash), adhesive tape, codeine (headache, migraines), fentanyl (rash), tizanidine (rash), Percocet (rash), Zofran (rash).      Home medications include acetaminophen solution, clozapine 200 mg at bedtime, Benadryl 25 mg at bedtime as needed, Colace, gabapentin 100 mg twice daily and 300 mg at bedtime, Rozerem 8 mg at bedtime,, sumatriptan 100 mg as needed for migraine, Topamax 200 mg twice daily, tramadol 50 mg every 6 hours as needed     PLAN:   1) Pain is consistent with post op pain.   Multimodal Medication Therapy  Topical: allergies noted to lidocaine   NSAID'S: CrCl 115. Allergies noted to Toradol   Steroids: none  Muscle Relaxants: add Robaxin 500 mg qid    Adjuvants: APAP q8h, Gabapentin 100-100-300 mg (home med) - she has not filled Gabapentin since 11/2023 and patient reports not taking this - will decrease to 100 mg bid for post op pan   Opioids: Oxycodone 5-10 mg q4h prn (allergy listed as  rash, hives, will monitor) - she reports poor tolerance to Oxycodone and no relief. Will stop Oxycodone. Trial MSIR 7.5-15 mg q4h prn. Per  she fills tramadol however history notable for seizures. Do not recommend Tramadol.   IV Pain medication: morphine 1-2 mg q6h prn (codeine allergy listed as rash, will monitor)  Non-medication interventions: Ice, Rest, PT, and OT  Constipation Prophylaxis: Bisacodyl Suppository, Senna-docusate, and Miralax     -Opioid prescriber has been Kei De Leon MD - Primary Care Provider   -MN  pulled from system on 5/23/24. This indicates fills for tramadol, gabapentin, Robitussin with codeine.  3/20/2024 tramadol 50 mg #60 for 15 days  1/29/2024 tramadol 50 mg #60 for 15 days  12/15/2023 tramadol 50 mg #60 for 15 days  11/20/2023 gabapentin 100 mg #30 for 30 days  11/3/2023 tramadol 50 mg #30 for 8 days  Discharge Recommendations - We recommend prescribing the following at the time of discharge: To be determined    Subjective:  Describes pain as 5-7/10 and aching, sore in the low back. Pain does not radiate. The patient denies nausea, vomiting, constipation, diarrhea, chest pain, shortness of breath, dizziness, fever, chills, paresthesia, saddle anesthesia, and changes to bowel or bladder habits. The patient reports passing flatus and no BM yet.        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 with restricted hearing of left ear    Migraine    Left homonymous hemianopsia    Neutrophilic leukocytosis    Occipital neuralgia of left side    Osteoporosis    Otalgia, right    Overactive bladder    Pelvic and perineal pain    Perennial allergic rhinitis    Peripheral neuropathy    Personality disorder (H)    Prolonged Q-T interval on ECG    Psychophysiological insomnia    Retrobulbar neuritis of both eyes    Right lumbar radiculopathy    Schizoaffective disorder, chronic condition (H)    Seizure disorder, grand mal (H)    Sensorineural hearing loss (SNHL) of left ear with restricted hearing of right ear    Spasm    Stress incontinence of urine    Weakness of right foot    Vitamin D deficiency    Unspecified visual disturbance    Unilateral weakness    Type II diabetes mellitus with complication (H)    Tobacco abuse    Tension headache    Atypical chest pain    COPD exacerbation (H)    Hypoxia    Episode of loss of consciousness    Chronic seizure disorder with history of head trauma (H)    Degenerative lumbar spinal stenosis    Spinal stenosis of lumbar region with neurogenic claudication        Objective:    History   Drug Use Unknown          Tobacco Use      Smoking status: Every Day        Packs/day: 0.25        Types: Cigarettes      Smokeless tobacco: Never      Vital signs in last 24 hours:  /60 (BP Location: Right arm)   Pulse 86   Temp 97.9  F (36.6  C) (Oral)   Resp 20   Ht 1.499 m (4' 11\")   Wt 69.4 kg (153 lb)   SpO2 90%   BMI 30.90 kg/m      Weight:     Vitals:    05/23/24 0751   Weight: 69.4 kg (153 lb)      Weight change:   Body mass index is 30.9 kg/m .    Intake/Output last 3 shifts:  I/O last 3 completed shifts:  In: 2595 [I.V.:2550; IV Piggyback:45]  Out: 515 [Urine:500; Drains:15]  Intake/Output this shift:  I/O this shift:  In: 737.5 [P.O.:300; I.V.:437.5]  Out: 500 [Urine:500]    Review of Systems:   As per subjective, all others negative.    Physical Exam:  General " Appearance:  Alert, cooperative, no distress, up in chair, up with SBA and a FWW   Head:  Normocephalic, without obvious abnormality, atraumatic   Eyes:  PERRL, conjunctiva/corneas clear, EOM's intact   Nose: Nares normal, septum midline   Throat: Lips, mucosa, and tongue normal; teeth and gums normal   Neck: Supple, symmetrical, trachea midline   Back:   Incision is covered, lumbar brace on    Lungs:   Clear to auscultation bilaterally, respirations unlabored   Heart:  Regular rate and rhythm, S1, S2 normal    Abdomen:   Soft, non-tender   Extremities: Extremities normal, atraumatic   Skin: Skin warm, dry    Neurologic: Alert and oriented X 3, Moves all 4 extremities   Psych: Affect is appropriate      Imaging: Reviewed I have personally reviewed pertinent notes, labs, tests, and radiologic imaging in patient's chart.  Labs: Reviewed I have personally reviewed pertinent notes, labs, tests, and radiologic imaging in patient's chart.  Notes: Reviewed I have personally reviewed pertinent notes, labs, tests, and radiologic imaging in patient's chart.    Total time spent 35 minutes with greater than 50% in consultation, education and coordination of care.   Also discussed with Orthopedics.   Treatment plan includes: multimodal pain approach, Hospital Medicine Service for medical management, Orthopedics, PT, OT.   Patient educated regarding: multimodal pain approach and medications as listed above.   Elements of Medical Decision Making as described above. Acute or chronic illness or injury or surgery. High risk therapy including opioids, high risk drug therapy including oral and/or parenteral controlled substances.    Patient is understanding of the plan. All questions and concerns addressed to patient's satisfaction.     Loli ARZATE, MICKEYP-C  Acute Care Pain Management Program  [facility]   Monday-Friday 8a-4p   Page via Epic or Extended Systems

## 2024-05-24 NOTE — PROGRESS NOTES
Occupational Therapy Discharge Summary    Reason for therapy discharge:    All goals and outcomes met, no further needs identified.    Progress towards therapy goal(s). See goals on Care Plan in [facility] electronic health record for goal details.  Goals met    Therapy recommendation(s):    No further therapy is recommended.

## 2024-05-24 NOTE — PROCEDURES
"PICC Line Insertion Procedure Note  Pt. Name: Rukhsana Griffith  MRN:        [id number]    Procedure: Insertion of a  triple Lumen  5 fr  Bard SOLO (valved) Power PICC, Lot number [id number]    Indications: Vascular Access    Contraindications : none    Procedure Details     Patient identified with 2 identifiers and \"Time Out\" conducted.  .     Central line insertion bundle followed: hand hygiene performed prior to procedure, site cleansed with cholraprep, hat, mask, sterile gloves, sterile gown worn, patient draped with maximum barrier head to toe drape, sterile field maintained.    The vein was assessed and found to be compressible and of adequate size.     Lidocaine 1% 2 ml administered sq to the insertion site. A 5 Fr PICC was inserted into the basilic vein of the right arm with ultrasound guidance. 1 attempt(s) required to access vein.   Catheter threaded without difficulty. Good blood return noted.    Modified Seldinger Technique used for insertion.    The 8 sharps that are included in the PICC insertion kit were accounted for and disposed of in the sharps container prior to breakdown of the sterile field.    Catheter secured with Statlock, biopatch and Tegaderm dressing applied.    Findings:    Total catheter length  40 cm, with 4 cm exposed. Mid upper arm circumference is 28 cm. Catheter was flushed with 30 cc NS. Patient  tolerated procedure well.    Tip placement verified by 3CG technology. Tip placement in the SVC/RA junction.    CLABSI prevention brochure left at bedside.    Patient's primary RN notified PICC is ready for use.      Comments:        Hillary Stewarttasky RN      "

## 2024-05-24 NOTE — PROGRESS NOTES
"I Illness Severity: Stable    PPatient Summary:      Telemetry Orders: No    Interpretation of Cardiac Rhythm: N/A    Dyspnea improved and O2 sats >88% at RA or at prior home O2 therapy level:  No, patient still on 2L nasal cannula    Last Bowel Movement: 5/22    Acute Symptoms or Concerns: hypoxia and pain control    Is this a new or worsening symptom or concern? No    Is this symptom or concern being adequately managed? Yes    Shift Summary: Educated on keeping oxygen on when hypoxic and patient is resistive to cares. Frustrated at times. PRN oral morphine given and pain is unchanged. Pain team updated.     AActions:    Diagnostic testing and/or procedures completed, and results are available for discharge:  Met    SSituational Awareness:      Anticipated post discharge treatment plan formulated and the following needs have been identified:   {Discharge Needs Indentified:536106}    SSynthesis:     Was bedside rounding completed on your shift? {YES/ NO:432528::\"Yes\"}     What team members present during bedside rounds?  {Team Members:703797}         "

## 2024-05-24 NOTE — PROGRESS NOTES
"Orthopedic Progress Note      Assessment: 1 Day Post-Op  S/P Procedure(s):  RIGHT LUMBAR 4 - LUMBAR 5 MEDIAL FACETECTOMY DECOMPRESSION @    Plan:   - Continue PT/OT  - Weightbearing status: WBAT can use brace for comfort  - No bending, twisting or lifting more than 10 pounds for 6 weeks.  - Drain can be removed when output is <30cc for 2 consecutive shifts or POD3  - Anticoagulation: no chemical prophylaxis in addition to SCDs, ricco stockings and early ambulation.  - Orthosis consult placed for LSO brace  - Pain control at discharge: Oxycodone 5 mg 1-2 tabs Q4-6 hours x30-32 tabs, Hydroxyzine 25 mg QID PRN, Gabapentin 100 mg TID , Tizanidine 4 mg TID, Acetaminophen 1000 mg TID  - Discharge planning: pending drain output, passing gas, pain control and progression in therapy       Subjective:  Pain: mild  Chest pain, SOB: no  Nausea, Vomiting:  no  Lightheadedness, Dizziness:  mno  Neuro:  Patient denies new onset numbness or paresthesias    Patient is doing well this morning notes that her pain is well-controlled on oral pain medications.  She is voiding well ambulating and tolerating oral intake.  She has not passed gas yet.  Drain is in place with 15 out overnight this needs to be in for 24 hours prior to pulling.    Objective:  /63 (BP Location: Right arm)   Pulse 89   Temp 97.6  F (36.4  C) (Axillary)   Resp 22   Ht 1.499 m (4' 11\")   Wt 69.4 kg (153 lb)   SpO2 93%   BMI 30.90 kg/m    The patient is A&Ox3. Appears comfortable.   Sensation is intact.  Dorsiflexion and plantar flexion is intact.  Dorsalis pedis pulse intact.  Calves are soft and non-tender. Negative Jasmin's.  The incision is covered. Dressing C/D/I.    Drain with 15 out overnight    Pertinent Labs   Lab Results: personally reviewed.   Lab Results   Component Value Date    INR 0.90 09/11/2023    INR 1.07 07/14/2019     Lab Results   Component Value Date    WBC 15.5 (H) 05/23/2024    WBC 15.5 (H) 05/23/2024    HGB 14.9 05/23/2024    HGB " 14.9 05/23/2024    HCT 44.5 05/23/2024    HCT 44.5 05/23/2024    MCV 96 05/23/2024    MCV 96 05/23/2024     05/23/2024     05/23/2024     Lab Results   Component Value Date     05/23/2024    CO2 22 05/23/2024         Report completed by:  Anel Weinberg PA-C/Dr. [facility]    Date: 5/24/2024  Time: 9:11 AM

## 2024-05-24 NOTE — CONSULTS
Care Management Initial Consult    General Information  Assessment completed with: Patient,    Type of CM/SW Visit: Initial Assessment    Primary Care Provider verified and updated as needed:     Readmission within the last 30 days: no previous admission in last 30 days      Reason for Consult: transportation, discharge planning  Advance Care Planning: Advance Care Planning Reviewed: no concerns identified        Communication Assessment  Patient's communication style:      Hearing Difficulty or Deaf: no   Wear Glasses or Blind: yes    Cognitive  Cognitive/Neuro/Behavioral: .WDL except, level of consciousness, motor response, arousability  Level of Consciousness: lethargic  Arousal Level: arouses to voice  Orientation: oriented x 4 Speech: spontaneous, incoherent    Living Environment:   People in home: alone     Current living Arrangements: apartment      Able to return to prior arrangements: yes    Family/Social Support:  Care provided by: self  Provides care for: no one  Marital Status:   Friend          Description of Support System: Supportive       Current Resources:      Equipment currently used at home: walker, rolling  Supplies currently used at home:      Employment/Financial:  Employment Status: retired        Does the patient's insurance plan have a 3 day qualifying hospital stay waiver?  No    Lifestyle & Psychosocial Needs:  Social Determinants of Health     Food Insecurity: No Food Insecurity (3/31/2022)    [facility], [facility]    Food Insecurity     Worried About Running Out of Food in the Last Year: 1   Depression: Not at risk (9/19/2022)    [facility] from [facility], [facility]    PHQ-2     PHQ-2 TOTAL SCORE: 0   Housing Stability: Low Risk  (3/31/2022)    Received from [facility], [facility]    Housing Stability     Unable to Pay for Housing in the Last Year: 1   Tobacco Use: High Risk (5/23/2024)    Patient History     Smoking Tobacco Use: Every Day     Smokeless Tobacco Use: Never     Passive Exposure: Not on file   Financial Resource Strain: Low Risk  (3/31/2022)    [facility], [facility]    Financial Resource Strain     Difficulty of Paying Living Expenses: 3     Difficulty of Paying Living Expenses: Not on file   Alcohol Use: Not on file   Transportation Needs: No Transportation Needs (3/31/2022)    [facility], [facility]    Transportation Needs     Lack of Transportation (Medical): 1   Physical Activity: Not on file   Interpersonal Safety: Not on file   Stress: Not on file   Social Connections: Unknown (4/1/2023)    [facility], [facility]    Social Connections     Frequency of Communication with Friends and Family: Not on file   Health Literacy: Not on file       Functional Status:  Prior to admission patient needed assistance:   Dependent ADLs:: Independent  Dependent IADLs:: Independent      Additional Information:  Rukhsana Griffith is a(n) 63 year old year old female who presented with Lumbar radiculopathy, right [M54.16]  Lumbar stenosis [M48.061].     SW met with  patient at bedside. Introduced self and role. SW completed assessment with patient. SW received update from therapy that pt was asking about getting in touch with her CADI worker and how she would get home at discharge. SW met with pt at bedside and received CADI workers number from patient. Patient states she normally takes [employer] transport but does feel better about taking w/c transport at discharge to help get her to her door. Patient in agreement with SW setting this  up for tomorrow afternoon and confirmed address on Facesheet. Patient states that she would like her CADI worker to help her get PCA services and denies having any at this time. Pt agreeable for SW to send therapy notes to CADI worker that may assist with assessment.  Patient denies any further needs at this time.    0912: ANNA MARIE called pt's CADI worker, Rebeca Gatica, [phone]. Rebeca is out of the office until next week. ANNA MARIE lvm with pt's request and share that ANNA MARIE will fax over information for Rebeca.    0927: ANNA MARIE called [employer]. Transportation set to go home with a  between: [phone]. Transportation will bring a w/c and 2L O2 if needed.     ANNA MARIE faxed notes to Rebeca, [phone].    Contacts:  Extended Emergency Contact Information  Primary Emergency Contact: Taty Garcia  Work Phone: [phone]  Mobile Phone: [phone]  Relation: Other    CADI Worker:  Rebecalona Gatica   Phone: [phone]  Fax: [phone]    No CM needs identified at this time. Please call or consult CM team if needed. .      Kayleigh Roberts

## 2024-05-24 NOTE — PROGRESS NOTES
[facility], [place]    PROGRESS NOTE - Hospitalist Service    ASSESSMENT AND PLAN     Active Problems:    Degenerative lumbar spinal stenosis    Spinal stenosis of lumbar region with neurogenic claudication    Rukhsana Griffith is a 63 year old female with Diabetes Mellitus, Moderate persistent asthma, Schizoaffective disorder, hypertension, chronic stable hyponatremia, tobacco use has topped for 3 weks as of 5/21, anxiety, hx of seizure disorder, left knee arthroplasty, total abdominal hysterectomy and BSO, gastric nerve stimulator, gait instability and back pain found to have a synovial cyst. Underwent R L4, L5 decompression on 5/23/2024.     Acute hypoxic/hypercapneic respiratory failure  COPD exacerbation  Chronic hypoxic respiratory  Moderate persistent asthma   Allergic rhinitis  PTA Duoneb tid x 3 days before surgery  She smoked one cigarette one night before surgery after quitting 3 weeks  Took melatonin 5mg from the night before surgery   Received BIPAP overnight and now on 2 LPM NC  Suspect GABRIELLE, outpatient workup recommended- sleep referral at discharge   Continue oral steroids for COPD exacerbation today 5/24   Continue home Symbicort, Duoneb q6h scheduled and albuterol inhaler prn  Continue home Nasonex/Flonase  To avoid further respiratory suppression, holding clozapine.     Insomnia  Has clozapine 200mg, diphenhydramine 25mg prn for itching and ramelteon 8mg that could sedate her  Monitor closely  To avoid further respiratory suppression, holding clozapine, ramelteon and diphenhydramine dose. If withdrawing, will reevaluate for possible dose of clozapine     Leukocyte elevation  No normal WBC since 2022 at least, always 12-15 with neutrophil and monocyte elevation mildly  This is likely reactive from stimulator placement?      Hyponatremia  Na 133-135 in the last 4 months- stable      Mild hypercalcemia  Improved after IV fluids     Diabetes  Home dose glargine 50u at night. Aspart  20/15/15 for meals.  While in the hospital-Lantus 20 units twice daily, aspart 20 units before breakfast, 12 units before lunch and 8 units with supper     Barriers to discharge: Hypoxia, drain output, passing gas    Present on Admission:   Degenerative lumbar spinal stenosis   Spinal stenosis of lumbar region with neurogenic claudication      Medically Ready for Discharge: Anticipated Tomorrow    Subjective:  Patient sitting in a chair.  Demanding to get her morning medications.  Frustrated about being on oxygen and asking to walk around.    PHYSICAL EXAM  Temp:  [97.6  F (36.4  C)-98.6  F (37  C)] 97.9  F (36.6  C)  Pulse:  [77-89] 88  Resp:  [0-38] 20  BP: (103-150)/(51-74) 110/60  FiO2 (%):  [35 %] 35 %  SpO2:  [90 %-98 %] 93 %  Wt Readings from Last 1 Encounters:   05/23/24 69.4 kg (153 lb)       Intake/Output Summary (Last 24 hours) at 5/24/2024 1310  Last data filed at 5/24/2024 1300  Gross per 24 hour   Intake 1387.5 ml   Output 715 ml   Net 672.5 ml      Body mass index is 30.9 kg/m .    GENRL: Alert and answering questions. Not in acute distress. Sitting up in a chair.   CHEST: Diminished throughout.  Breathing easily, NC in place   HEART: Regular rate   EXTRM: No pedal edema  NEURO: Cranial nerves II-XII grossly intact. No focal neurological deficit. No involuntary movements. Normal mentation  PSYCH: annoyed affect and mood.   INTGM: No skin rash    Medical Decision Making       35 MINUTES SPENT BY ME on the date of service doing chart review, history, exam, documentation & further activities per the note.      PERTINENT LABS/IMAGING:  No results found for this visit on 05/23/24.  Most Recent 3 CBC's:  Recent Labs   Lab Test 05/23/24  1645 12/07/23  0031 09/12/23  0435   WBC 15.5*  15.5* 13.8* 13.3*   HGB 14.9  14.9 15.8* 14.2   MCV 96  96 92 94     204 244 244     Most Recent 3 BMP's:  Recent Labs   Lab Test 05/24/24  1123 05/24/24  0555 05/23/24  2155 05/23/24  1800 05/23/24  1341  "12/07/23 0622 12/07/23 0031 09/12/23 0741 09/12/23 0435   NA  --   --   --   --  136  --  137  --  137   POTASSIUM  --   --   --   --  4.3  --  3.6  --  4.1   CHLORIDE  --   --   --   --  107  --  102  --  105   CO2  --   --   --   --  22  --  24  --  24   BUN  --   --   --   --  10.0  --  9.3  --  8.1   CR  --   --   --   --  0.56  --  0.61  --  0.56   ANIONGAP  --   --   --   --  7  --  11  --  8   DANIA  --   --   --   --  9.6  --  10.3*  --  9.6   * 95 190*   < > 134*   < > 89   < > 135*    < > = values in this interval not displayed.     Most Recent 2 LFT's:  Recent Labs   Lab Test 12/07/23 0031 09/11/23  2230   AST 29 26   ALT 22 18   ALKPHOS 143 128*   BILITOTAL 0.2 <0.2       No results for input(s): \"CHOL\", \"HDL\", \"LDL\", \"TRIG\", \"CHOLHDLRATIO\" in the last 65942 hours.  No results for input(s): \"LDL\" in the last 54241 hours.  Recent Labs   Lab Test 05/24/24  1123 05/23/24  1800 05/23/24  1342   NA  --   --  136   POTASSIUM  --   --  4.3   CHLORIDE  --   --  107   CO2  --   --  22   *   < > 134*   BUN  --   --  10.0   CR  --   --  0.56   GFRESTIMATED  --   --  >90   DANIA  --   --  9.6    < > = values in this interval not displayed.     Recent Labs   Lab Test 09/12/23 0435 06/03/23  2224   A1C 7.0* 6.9*     Recent Labs   Lab Test 05/23/24  1645 12/07/23 0031 09/12/23 0435   HGB 14.9  14.9 15.8* 14.2     Recent Labs   Lab Test 07/14/19  1630   TROPONINI <0.01     Recent Labs   Lab Test 12/07/23  0031 09/02/23  0104 06/24/23  0101   NTBNPI 49 50 92     Recent Labs   Lab Test 09/11/23  2230   TSH 1.76     Recent Labs   Lab Test 09/11/23  2230 07/14/19  1630   INR 0.90 1.07       Francine Roman DO  Hospitalist Service  [facility]      "

## 2024-05-24 NOTE — PROGRESS NOTES
05/24/24 0800   Appointment Info   Signing Clinician's Name / Credentials (PT) denise Mccormack, PT, DPT   Living Environment   People in Home alone   Current Living Arrangements apartment   Home Accessibility no concerns   Self-Care   Equipment Currently Used at Home none;walker, rolling  (4WW)   Activity/Exercise/Self-Care Comment Patient uses 4WW in community. Pt reports will be having PCA to assist after surgery   General Information   Onset of Illness/Injury or Date of Surgery 05/23/24   Referring Physician Dr. Dylan Alba   Patient/Family Therapy Goals Statement (PT) Go home today   Pertinent History of Current Problem (include personal factors and/or comorbidities that impact the POC) S/P L4-5 decompression   Existing Precautions/Restrictions spinal   Weight-Bearing Status - LLE full weight-bearing   Weight-Bearing Status - RLE full weight-bearing   Bed Mobility   Bed Mobility supine-sit   Supine-Sit [place] (Bed Mobility) contact guard;verbal cues   Transfers   Transfers sit-stand transfer   Maintains Weight-bearing Status (Transfers) able to maintain   Sit-Stand Transfer   Sit-Stand [place] (Transfers) contact guard;verbal cues   Assistive Device (Sit-Stand Transfers) walker, front-wheeled   Gait/Stairs (Locomotion)   [place] Level (Gait) contact guard;verbal cues   Assistive Device (Gait) walker, front-wheeled   Distance in Feet (Gait) 10   Pattern (Gait) step-through   Deviations/Abnormal Patterns (Gait) orly decreased;gait speed decreased   Maintains Weight-bearing Status (Gait) able to maintain   Clinical Impression   Criteria for Skilled Therapeutic Intervention Yes, treatment indicated   PT Diagnosis (PT) Impaired functional mobility   Influenced by the following impairments weakness, pain   Functional limitations due to impairments transfers, gait   Clinical Presentation (PT Evaluation Complexity) stable   Clinical Presentation Rationale Pt presents as medically diagnosed    Clinical Decision Making (Complexity) low complexity   Planned Therapy Interventions (PT) gait training;home exercise program;patient/family education;transfer training   Risk & Benefits of therapy have been explained evaluation/treatment results reviewed;care plan/treatment goals reviewed;patient   PT Total Evaluation Time   PT Eval, Low Complexity Minutes (24401) 10   Physical Therapy Goals   PT Frequency One time eval and treatment only   PT Predicted Duration/Target Date for Goal Attainment 05/24/24   PT Goals Gait;Transfers   PT: Transfers Supervision/stand-by assist;Sit to/from stand;Assistive device;Goal Met   PT: Gait Supervision/stand-by assist;Rolling walker;150 feet;Goal Met  (4WW)   Interventions   Interventions Quick Adds Gait Training;Therapeutic Activity;Therapeutic Procedure   Therapeutic Procedure/Exercise   Ther. Procedure: strength, endurance, ROM, flexibillity Minutes (73003) 5   Symptoms Noted During/After Treatment none   Treatment Detail/Skilled Intervention Supine spine exercises x 10 reps with bilateral LEs, SBA, verbal and tactile cueing for exercise technique   Therapeutic Activity   Therapeutic Activities: dynamic activities to improve functional performance Minutes (70640) 15   Symptoms Noted During/After Treatment None   Treatment Detail/Skilled Intervention Supine to sit with log roll technique, CGA, verbal cueing for technique. Sit<>Stand with FWW, CGA, verbal cueing for hand placement. Sit<>Stand with 4WW x 3 reps, SBA verbal  cueing for hand placement and use of brakes. Toilet transfer with 4WW, SBA, verbal cueing for safety, device advancement in small spaces. Reviewed spinal precautions and needed reminders during toileting and washing at sink.   Gait Training   Gait Training Minutes (14635) 15   Symptoms Noted During/After Treatment (Gait Training) fatigue   Treatment Detail/Skilled Intervention Sp02 on RA prior to ambulation 90%, after 87%, reapplied 2L 02 per nursing. Patient  then admits to wearing 3L 02 overnight at home and sometimes uses 02 during the day. verbal cueing for safety and use of brakes with 4WW, safe technique after cueing. Patient reports no stairs. 120ft with FWW, SBA. 125ft, 10ftx2 with 4WW, SBA. Education on changing positions frequently at home and progressing ambulation as able   Distance in Feet 125x2, 10x2   [place] Level (Gait Training) stand-by assist   Physical Assistance Level (Gait Training) verbal cues   Weight Bearing (Gait Training) full weight-bearing   Assistive Device (Gait Training) rolling walker  (4WW)   Pattern Analysis (Gait Training) swing-through gait   Gait Analysis Deviations decreased orly;decreased step length   PT Discharge Planning   PT Plan DC PT   PT Discharge Recommendation (DC Rec) (S)  home with assist   PT Rationale for DC Rec Patient ambulating safely, no stairs, does live alone but reports will have assist from PCA and friends as needed for driving   PT Brief overview of current status Amb 125ft with 4WW, SBA   PT Equipment Needed at Discharge walker, rolling  (4WW, has own)   Total Session Time   Timed Code Treatment Minutes 35   Total Session Time (sum of timed and untimed services) 45

## 2024-05-24 NOTE — PROGRESS NOTES
Pt seen on 2L NC sating low 90's. Scheduled Duoneb treatment given. BS clear;diminished with expiratory wheezes. BiPAP on standby. RT will continue to follow.

## 2024-05-24 NOTE — PLAN OF CARE
Goal Outcome Evaluation:      Plan of Care Reviewed With: patient    Overall Patient Progress: improvingOverall Patient Progress: improving    Outcome Evaluation: POD1 L4-5 decompression with MD Alba. Post op c/b lethargy, acidosis requiring bipap. Overnight pt is AOx4, woke up and refused bipap so placed on oxymask 6L, now 4L, sp02 maintained at 92% and up. Pt c/o 10/10 pain this shift, requested IV Morphine PRN Q2h, refused to take PO oxycodone, said that it hasn't helped much in the past. Ice packs replaced and applied to back. CMS checks intact, pt did have B/L hands swelling, unchanged. Pt denied tingling/numbness.Tele reads 1st degree AVB. Diet advanced to CLD. Pt able to void, 1x incont episode this am. R hemovac to mid back with minimal output. Dressing to back C/D/I. Pt on IVF NS @ 75ml/h. No other complaint at this time, call light within reach.

## 2024-05-24 NOTE — PROGRESS NOTES
S: Pt is a 63 yof seen at [facility], room 334, for delivery of a lumbar sacral orthosis (LSO), ordered by Anel Weinberg PA-C.  DX: s/p L4-L5 decompression  O: 4 11 . 153 lbs.  A: Heavenlyg Epic 627 LSO circumference was adjusted to fit the patient appropriately. LSO was donned on the pt while she sat in a chair. Donning/doffing and wear/care instructions were discussed with the patient. 's written instructions were provided. Pt was satisfied with fit and function of device.  P: Pt to follow-up as needed. All questions were answered at this time.  G: Provide hydrostatic compression to stabilize surgical site and relieve lumbar pain.    Electronically signed by Leah Carrasco CPO, LPO

## 2024-05-24 NOTE — PROVIDER NOTIFICATION
Notified MD at 2230 PM regarding order clarification.      Spoke with: Dr. Ceballos    Orders were obtained.    Comments:     Paged Dr. Ceballos to clarify BiPap orders as pts VBG resulted as near baseline per chart records. Per Dr. Ceballos okay to remove if alert but BiPap to remain on while drowsy.

## 2024-05-24 NOTE — PROGRESS NOTES
05/24/24 1410   Appointment Info   Signing Clinician's Name / Credentials (OT) Nuzhat De Leon, OTR/L   Living Environment   People in Home alone  (pt reports she will have a PCA daily)   Current Living Arrangements apartment   Living Environment Comments pt has a satandard toilet with grabbars, tub shower with chair, reacher   Self-Care   Activity/Exercise/Self-Care Comment Pt was independent with ADLS and IADLS prior to admit   Instrumental Activities of Daily Living (IADL)   IADL Comments PCA will do until pt is recovered   General Information   Onset of Illness/Injury or Date of Surgery 05/23/24   Referring Physician Dylan Alba   Patient/Family Therapy Goal Statement (OT) get better and go home   Additional Occupational Profile Info/Pertinent History of Current Problem Rukhsana Griffith is a 63 year old female with Diabetes Mellitus, Moderate persistent asthma, Schizoaffective disorder, hypertension, chronic stable hyponatremia, tobacco use has topped for 3 weks as of 5/21, anxiety, hx of seizure disorder, left knee arthroplasty, total abdominal hysterectomy and BSO, gastric nerve stimulator, gait instability and back pain found to have a synovial cyst. Underwent R L4, L5 decompression on 5/23/2024.   Existing Precautions/Restrictions fall;no pivoting or twisting;lifting;spinal   Cognitive Status Examination   Affect/Mental Status (Cognitive) WNL   Range of Motion Comprehensive   General Range of Motion no range of motion deficits identified   Strength Comprehensive (MMT)   General Manual Muscle Testing (MMT) Assessment no strength deficits identified   Bed Mobility   Comment (Bed Mobility) min   Transfers   Transfer Comments min   Activities of Daily Living   BADL Assessment/Intervention lower body dressing;toileting;upper body dressing   Upper Body Dressing Assessment/Training   [place] Level (Upper Body Dressing) minimum assist (75% patient effort)   Lower Body Dressing Assessment/Training    [place] Level (Lower Body Dressing) minimum assist (75% patient effort)   Toileting   [place] Level (Toileting) minimum assist (75% patient effort)   Clinical Impression   Criteria for Skilled Therapeutic Interventions Met (OT) Yes, treatment indicated   OT Diagnosis Decreased independence with adls   OT Problem List-Impairments impacting ADL post-surgical precautions;pain   Assessment of Occupational Performance 3-5 Performance Deficits   Identified Performance Deficits dressing, transfers, bed mobility, toileting   Planned Therapy Interventions (OT) ADL retraining;bed mobility training;transfer training;home program guidelines   Clinical Decision Making Complexity (OT) detailed assessment/moderate complexity   Risk & Benefits of therapy have been explained evaluation/treatment results reviewed;care plan/treatment goals reviewed;risks/benefits reviewed;current/potential barriers reviewed;participants voiced agreement with care plan;participants included;patient   OT Total Evaluation Time   OT Eval, Moderate Complexity Minutes (18919) 15   OT Goals   Therapy Frequency (OT) One time eval and treatment   OT Goals Upper Body Dressing;Lower Body Dressing;Toilet Transfer/Toileting   OT: Upper Body Dressing Modified independent;including orthotic;within precautions;Goal Met;Completed   OT: Lower Body Dressing Modified independent;using adaptive equipment;within precautions;Goal Met;Completed   OT: Toilet Transfer/Toileting Modified independent;toilet transfer;cleaning and garment management;within precautions;Goal Met;Completed   Interventions   Interventions Quick Adds Self-Care/Home Management   Self-Care/Home Management   Self-Care/Home Mgmt/ADL, Compensatory, Meal Prep Minutes (35199) 30   Symptoms Noted During/After Treatment (Meal Preparation/Planning Training) fatigue;increased pain   Treatment Detail/Skilled Intervention Taught pt how to perform adls following spine precautions: total body dressing,  including back brace, transfers to and from bed, chair, toilet, car, and bed mobility using the log roll.  Pt mod I with all after OT intervention.  Gave handouts and answered all questions.   OT Discharge Planning   OT Plan dc OT   OT Discharge Recommendation (DC Rec) (S)  home with assist   OT Rationale for DC Rec Pt has good support at home   OT Brief overview of current status Pt is mod I with basic adls and functional mobility after OT intervention while following correct body mechanics   Total Session Time   Timed Code Treatment Minutes 30   Total Session Time (sum of timed and untimed services) 45

## 2024-05-25 ENCOUNTER — TELEPHONE (OUTPATIENT)
Dept: NURSING | Facility: CLINIC | Age: 64
End: 2024-05-25

## 2024-05-25 VITALS
SYSTOLIC BLOOD PRESSURE: 124 MMHG | TEMPERATURE: 98.5 F | DIASTOLIC BLOOD PRESSURE: 58 MMHG | WEIGHT: 153 LBS | OXYGEN SATURATION: 94 % | HEART RATE: 90 BPM | BODY MASS INDEX: 30.84 KG/M2 | HEIGHT: 59 IN | RESPIRATION RATE: 18 BRPM

## 2024-05-25 DIAGNOSIS — M48.061 DEGENERATIVE LUMBAR SPINAL STENOSIS: ICD-10-CM

## 2024-05-25 DIAGNOSIS — J44.1 COPD EXACERBATION (H): ICD-10-CM

## 2024-05-25 RX ORDER — METHOCARBAMOL 500 MG/1
500 TABLET, FILM COATED ORAL 3 TIMES DAILY PRN
Qty: 12 TABLET | Refills: 0 | Status: SHIPPED | OUTPATIENT
Start: 2024-05-25 | End: 2024-05-25

## 2024-05-25 RX ORDER — PREDNISONE 20 MG/1
40 TABLET ORAL DAILY
Qty: 8 TABLET | Refills: 0 | Status: SHIPPED | OUTPATIENT
Start: 2024-05-25 | End: 2024-09-13

## 2024-05-25 RX ORDER — PREDNISONE 20 MG/1
40 TABLET ORAL DAILY
Qty: 8 TABLET | Refills: 0 | Status: SHIPPED | OUTPATIENT
Start: 2024-05-25 | End: 2024-05-25

## 2024-05-25 RX ORDER — METHOCARBAMOL 500 MG/1
500 TABLET, FILM COATED ORAL 3 TIMES DAILY PRN
Qty: 12 TABLET | Refills: 0 | Status: SHIPPED | OUTPATIENT
Start: 2024-05-25 | End: 2024-09-13

## 2024-05-25 ASSESSMENT — ACTIVITIES OF DAILY LIVING (ADL)
ADLS_ACUITY_SCORE: 37
ADLS_ACUITY_SCORE: 37

## 2024-05-25 NOTE — TELEPHONE ENCOUNTER
Patient calling. She is wanting to talk with the unit from which she was recently discharged regarding her ADL ability after spinal surgery.     Patient needs written directions for what assistance she needs for dressing/grooming/bathing, lifting restrictions, bending restrictions, and what limits she has for out-of-the-home activities.     She would like orders faxed to Lisa Gatica (GRAZYNA sosa) [phone], regarding Aureablane Griffith.     Routed to Dr. JAIR Alba for requested orders. Patient is aware that the message may not be seen until Monday.     Patient also stated that the pharmacy her post-op medications were sent to is closed through the weekend, and requested that the prescriptions for momentasone and prednisone be transferred to [facility], which was done.     No triage.     Asya Sy RN  [facility]  May 25, 2024, 4:44 AM

## 2024-05-25 NOTE — PROGRESS NOTES
Hospitalist follow up note:    Contacted several times during the night regarding patient wishing to leave, speak to provider. I did eventually see her and speak about her concerns. She says that she feels great and wants to go home to tend to family member's medical emergency. She denies chest pain, dyspnea, abdominal pain, nausea, vomiting, urinary complaints, leg swelling. She is alert, clear mentally, breathing comfortably in room air, has regular pulse, no edema. She does still have PICC line in place and lumbar drain. She is a spine surgery patient and so I cannot discharge her but I would not be opposed to discharging her. She would need rx for opiates and other postop meds. I'm not sure that her lumbar drain can be pulled or not.    Domingo Ceballos DO   Pager #: [phone]

## 2024-05-25 NOTE — CARE PLAN
"Pt became very agitated and requested to speak to a doctor or walk out of hospital, pt is upset stating we changed all her meds, she seems fixated on po topamax 200 mg, stating she normally gets \"two and a half\" and that we are overdosing her, RN asked for home dosage but pt reports not knowing exact dosage, I explained she took that same dose this am and these are the orders per our doctors while in the hospital, RN also explained pt had option to refuse meds or any other service, but pt agreed to take medication. Pt also stated we are not giving the correct dose of lantus, pt was administered 20u and she states she takes 50u at home at bedtime. Pt then got very upset after RT offered to place bipap machine, I explained she did not have to wear bipap if she did not want to, she also refused to wear oxygen at this time. Pt has come out of room repeated times asking to speak to doctor. RN paged in house provider and requested to discuss meds w/pt. Provider agreed to speak to pt. RN informed pt provider will come speak with pt when available, pt agreed to plan.  "

## 2024-05-25 NOTE — PLAN OF CARE
Problem: Surgery Nonspecified  Goal: Absence of Bleeding  Outcome: Progressing  Goal: Anesthesia/Sedation Recovery  Outcome: Progressing  Intervention: Optimize Anesthesia Recovery  Recent Flowsheet Documentation  Taken 5/24/2024 2100 by Reyes, Dora, RN  Safety Promotion/Fall Prevention:   safety round/check completed   room organization consistent   room near nurse's station   room door open   supervised activity   lighting adjusted   increased rounding and observation   clutter free environment maintained  Goal: Nausea and Vomiting Relief  Outcome: Progressing  Goal: Effective Urinary Elimination  Outcome: Progressing  Goal: Effective Oxygenation and Ventilation  Outcome: Progressing  Intervention: Optimize Oxygenation and Ventilation  Recent Flowsheet Documentation  Taken 5/24/2024 2100 by Reyes, Dora, RN  Head of Bed (HOB) Positioning: HOB at 30 degrees   Goal Outcome Evaluation:    Pt A&O, pt continues to be agitated and upset d/t wanting to be discharged. RN paged provider, Dr Ceballos came and spoke to pt. RN contacted surgeon, Dr Draper and update on pt's status to remove drain. Dr Draper gave verbal order to remove hemovac drain, Dr Draper also stated pt contacted him directly a couple hours ago to request discharge. Dr Ceballos placed discharge order. Pt requested cab on phone, pt refused to stay in room and insisted to wait in ED for cab, cab to  pt at ED entrance. RN printed out discharge papers and gave to pt. RN escorted pt to ED.

## 2024-06-05 NOTE — DISCHARGE SUMMARY
Discharge Summary    Rukhsana Griffith MRN# [id number]   YOB: 1960 Age: 63 year old     Date of Admission:  5/23/2024  Date of Discharge:  5/25/2024  2:23 AM  Admitting Physician:  Dr. Dylan Alba   Discharge Physician:   Dr. Alba  Discharging Service:  Orthopedics/Spine Surgery       Primary Provider: Kei De Leon          Admission Diagnoses:   Lumbar radiculopathy, right [M54.16]  Lumbar stenosis [M48.061]          Discharge Diagnosis:     Patient Active Problem List    Diagnosis Date Noted    Spinal stenosis of lumbar region with neurogenic claudication 05/24/2024     Priority: Medium    Degenerative lumbar spinal stenosis 05/23/2024     Priority: Medium    Episode of loss of consciousness 09/12/2023     Priority: Medium    Chronic seizure disorder with history of head trauma (H) 09/12/2023     Priority: Medium    Hypoxia 06/24/2023     Priority: Medium    COPD exacerbation (H) 06/04/2023     Priority: Medium    Tension headache 04/13/2023     Priority: Medium    Atypical chest pain 04/13/2023     Priority: Medium    COPD mixed type (H) 12/19/2022     Priority: Medium    Iron deficiency anemia 12/19/2022     Priority: Medium     Formatting of this note might be different from the original.  EGD negative 6/07, heme + 8/08      Right lumbar radiculopathy 12/19/2022     Priority: Medium    History of methamphetamine abuse (H) 07/28/2022     Priority: Medium    Prolonged Q-T interval on ECG 06/16/2022     Priority: Medium    Left homonymous hemianopsia 05/26/2022     Priority: Medium    Retrobulbar neuritis of both eyes 05/26/2022     Priority: Medium    Altered mental status, unspecified 05/14/2022     Priority: Medium    Mixed conductive and sensorineural hearing loss of right ear with restricted hearing of left ear 04/26/2022     Priority: Medium    Otalgia, right 04/26/2022     Priority: Medium    Sensorineural hearing loss (SNHL) of left ear with restricted hearing of right ear  04/26/2022     Priority: Medium    Stress incontinence of urine 09/13/2021     Priority: Medium    Weakness of right foot 09/13/2021     Priority: Medium    Occipital neuralgia of left side 02/07/2020     Priority: Medium    Pelvic and perineal pain 04/02/2019     Priority: Medium    Calcified granuloma of lung (H) 11/19/2018     Priority: Medium     Formatting of this note might be different from the original.  Neg Quant gold 2014, granulomas in liver as well      Perennial allergic rhinitis 06/28/2018     Priority: Medium    Microcytic hypochromic anemia 02/04/2018     Priority: Medium    Delayed gastric emptying 02/04/2018     Priority: Medium    Abdominal pain, generalized 02/04/2018     Priority: Medium    Hypoglycemia 02/04/2018     Priority: Medium    Psychophysiological insomnia 11/10/2017     Priority: Medium    Chest pain 09/03/2017     Priority: Medium    Personality disorder (H) 08/23/2016     Priority: Medium    Spasm 07/21/2016     Priority: Medium    Tobacco abuse 06/30/2016     Priority: Medium    Vitamin D deficiency 06/14/2016     Priority: Medium    Increased frequency of urination 01/26/2016     Priority: Medium    Overactive bladder 08/03/2015     Priority: Medium    Unspecified visual disturbance 10/24/2014     Priority: Medium     Formatting of this note might be different from the original.   Side effect of meds.      Peripheral neuropathy 05/09/2014     Priority: Medium    Diabetic gastroparesis (H) 09/23/2013     Priority: Medium     Formatting of this note might be different from the original.  S/p placement of gastric neurostimulator      Anxiety 08/14/2013     Priority: Medium    Unilateral weakness 09/15/2012     Priority: Medium    Hypertension 09/06/2012     Priority: Medium    Dyslipidemia, goal LDL below 100 01/02/2012     Priority: Medium    Seizure disorder, grand mal (H) 07/23/2011     Priority: Medium    Chronic hyponatremia 01/21/2011     Priority: Medium    Asthma 10/17/2010  "    Priority: Medium     Formatting of this note might be different from the original.  Per review of hx appears mild to moderate persistent.  On advair      Irritable bowel syndrome 10/17/2010     Priority: Medium     Formatting of this note might be different from the original.  Chronic LLQ abdominal pain with frequent diarrhea/constipation, nausea/vomiting, and history of hemorrhoids.  Blood in her stool 3-4 times/mo. at baseline.  Chronic inflammation seen of colon from transverse to descending/signmoid.  Colonoscopy in 8/2009 was normal with multiple biopsies taken.      Neutrophilic leukocytosis 10/17/2010     Priority: Medium     Formatting of this note might be different from the original.  Peripheral smears (7/2-1- and 7/2008) confirm iron deficiency anemia (2010)+ likely reactive leukocytosis.  RANDA-2 negative, on clozapine (risk of leukopenia)      Migraine 02/19/2010     Priority: Medium    Osteoporosis 02/19/2010     Priority: Medium    Schizoaffective disorder, chronic condition (H) 09/14/2007     Priority: Medium     Formatting of this note might be different from the original.      Type II diabetes mellitus with complication (H) 09/14/2007     Priority: Medium     Formatting of this note might be different from the original.  + neuropathy (EMG done 4/2010) and nephropathy.  Follows with Dr. Huffman.  Proteinuria present  Hgb AIC 7.9% Apr 2012  No DR eye exam 8/15; Dr. Rehman, [facility]                  Discharge Disposition:     Discharged to home           Condition on Discharge:     Discharge condition: Stable   Discharge vitals: Blood pressure 124/58, pulse 90, temperature 98.5  F (36.9  C), temperature source Oral, resp. rate 18, height 1.499 m (4' 11\"), weight 69.4 kg (153 lb), SpO2 94%.   Code status on discharge: Full Code           Procedures / Labs / Imaging:   Invasive procedures: L4-5 medial facetectomy and decompression               Discharge Medications:     Discharge " Medication List as of 5/25/2024  2:01 AM        START taking these medications    Details   methocarbamol (ROBAXIN) 500 MG tablet Take 1 tablet (500 mg) by mouth 3 times daily as needed for muscle spasms, Disp-12 tablet, R-0, E-Prescribe      predniSONE (DELTASONE) 20 MG tablet Take 2 tablets (40 mg) by mouth daily for 4 days, Disp-8 tablet, R-0, E-Prescribe           CONTINUE these medications which have NOT CHANGED    Details   acetaminophen (TYLENOL) 160 mg/5 mL (5 mL) Soln solution [ACETAMINOPHEN (TYLENOL) 160 MG/5 ML (5 ML) SOLN SOLUTION] Take 10 mL by mouth every 4 (four) hours as needed (Pain).       , Historical      albuterol (PROAIR HFA;PROVENTIL HFA;VENTOLIN HFA) 90 mcg/actuation inhaler [ALBUTEROL (PROAIR HFA;PROVENTIL HFA;VENTOLIN HFA) 90 MCG/ACTUATION INHALER] Inhale 2 puffs every 6 (six) hours as needed for wheezing., Historical      albuterol (PROVENTIL) 2.5 mg /3 mL (0.083 %) nebulizer solution [ALBUTEROL (PROVENTIL) 2.5 MG /3 ML (0.083 %) NEBULIZER SOLUTION] Take 2.5 mg by nebulization every 4 (four) hours as needed for wheezing.       , Historical      aluminum-magnesium hydroxide-simethicone (MAALOX MAX STRENGTH) 400-400-40 mg/5 mL suspension [ALUMINUM-MAGNESIUM HYDROXIDE-SIMETHICONE (MAALOX MAX STRENGTH) 400-400-40 MG/5 ML SUSPENSION] Take 5 mL by mouth every 4 (four) hours as needed for indigestion.       , Historical      ammonium lactate (AMLACTIN) 12 % cream [AMMONIUM LACTATE (AMLACTIN) 12 % CREAM] Apply 1 application topically 2 (two) times a day.       Historical      atropine 1 % ophthalmic solution Place 1-2 drops under the tongue 2 times daily as needed for secretions, Historical      budesonide-formoterol (SYMBICORT) 160-4.5 mcg/actuation inhaler [BUDESONIDE-FORMOTEROL (SYMBICORT) 160-4.5 MCG/ACTUATION INHALER] Inhale 2 puffs 2 (two) times a day.       , Historical      cholecalciferol, vitamin D3, 2,000 unit Tab [CHOLECALCIFEROL, VITAMIN D3, 2,000 UNIT TAB] Take 2,000 Units by mouth  daily., Historical      cloZAPine (CLOZARIL) 100 MG tablet [CLOZAPINE (CLOZARIL) 100 MG TABLET] Take 200 mg by mouth at bedtime.       , Historical      diphenhydrAMINE (BENADRYL) 25 mg capsule [DIPHENHYDRAMINE (BENADRYL) 25 MG CAPSULE] Take 25 mg by mouth at bedtime as needed for itching., Historical      docusate sodium (COLACE) 100 MG capsule [DOCUSATE SODIUM (COLACE) 100 MG CAPSULE] Take 100 mg by mouth 2 (two) times a day.       , Historical      EPINEPHrine (EPIPEN) 0.3 mg/0.3 mL atIn [EPINEPHRINE (EPIPEN) 0.3 MG/0.3 ML ATIN] Inject 0.3 mg into the shoulder, thigh, or buttocks as needed., Historical      escitalopram oxalate (LEXAPRO) 10 MG tablet Take 15 mg by mouth daily, Historical      fluoride, sodium, (DENTAGEL) 1.1 % Gel dental gel [FLUORIDE, SODIUM, (DENTAGEL) 1.1 % GEL DENTAL GEL] Apply 1 application to teeth at bedtime.Historical      fluticasone (FLONASE) 50 MCG/ACT nasal spray Spray 1 spray into both nostrils daily, Historical      fluticasone-salmeterol (ADVAIR) 500-50 MCG/ACT inhaler Inhale 1 puff into the lungs 2 times daily, Historical      !! gabapentin (NEURONTIN) 100 MG capsule Take 100 mg by mouth 2 times daily (before meals) 100mg in morning, 100mg with lunch, 300mg at HS, Historical      !! gabapentin (NEURONTIN) 300 MG capsule Take 300 mg by mouth at bedtime 100mg in morning, 100mg with lunch, 300mg at HS, Historical      glucagon, human recombinant, (GLUCAGEN HYPOKIT) 1 mg injection [GLUCAGON, HUMAN RECOMBINANT, (GLUCAGEN HYPOKIT) 1 MG INJECTION] Infuse 1 mg into a venous catheter once as needed (Low blood sugars).       , Historical      !! insulin aspart (NOVOLOG FLEXPEN) 100 UNIT/ML pen Inject 15 Units Subcutaneous daily (before lunch), Historical      !! insulin aspart (NOVOLOG PEN) 100 UNIT/ML pen Inject 15 Units Subcutaneous daily (with dinner), Historical      insulin aspart U-100 (NOVOLOG) 100 unit/mL injection Inject 20 Units Subcutaneous daily before breakfast, Historical       insulin glargine (LANTUS) 100 unit/mL injection Inject 50 Units Subcutaneous at bedtime, Historical      ipratropium-albuterol (DUO-NEB) 0.5-2.5 mg/3 mL nebulizer [IPRATROPIUM-ALBUTEROL (DUO-NEB) 0.5-2.5 MG/3 ML NEBULIZER] Take 3 mL by nebulization 4 (four) times a day as needed.       , Historical      lisinopril (PRINIVIL,ZESTRIL) 10 MG tablet [LISINOPRIL (PRINIVIL,ZESTRIL) 10 MG TABLET] Take 10 mg by mouth daily., Historical      loratadine (CLARITIN) 10 mg tablet [LORATADINE (CLARITIN) 10 MG TABLET] Take 10 mg by mouth daily., Historical      lubiprostone (AMITIZA) 24 MCG capsule [LUBIPROSTONE (AMITIZA) 24 MCG CAPSULE] Take 24 mcg by mouth 2 (two) times a day., Historical      magnesium hydroxide (MILK OF MAG) 400 mg/5 mL Susp suspension [MAGNESIUM HYDROXIDE (MILK OF MAG) 400 MG/5 ML SUSP SUSPENSION] Take 30 mL by mouth 2 (two) times a day as needed.       , Historical      mometasone (NASONEX) 50 mcg/actuation nasal spray [MOMETASONE (NASONEX) 50 MCG/ACTUATION NASAL SPRAY] 2 sprays into each nostril daily., Historical      multivitamin with minerals (THERA-M) 9 mg iron-400 mcg Tab tablet [MULTIVITAMIN WITH MINERALS (THERA-M) 9 MG IRON-400 MCG TAB TABLET] Take 1 tablet by mouth daily., Historical      nicotine (NICODERM CQ) 21 mg/24 hr [NICOTINE (NICODERM CQ) 21 MG/24 HR] Place 1 patch on the skin daily as needed for smoking cessation.       , Historical      omeprazole (PRILOSEC) 20 MG DR capsule Take 20 mg by mouth daily, Historical      polyethylene glycol (MIRALAX) 17 gram packet [POLYETHYLENE GLYCOL (MIRALAX) 17 GRAM PACKET] Take 17 g by mouth 2 (two) times a day.       , Historical      pravastatin (PRAVACHOL) 40 MG tablet [PRAVASTATIN (PRAVACHOL) 40 MG TABLET] Take 40 mg by mouth every morning.       , Historical      ramelteon (ROZEREM) 8 mg tablet [RAMELTEON (ROZEREM) 8 MG TABLET] Take 8 mg by mouth at bedtime., Historical      SUMAtriptan (IMITREX) 100 MG tablet [SUMATRIPTAN (IMITREX) 100 MG TABLET]  Take 100 mg by mouth every 2 (two) hours as needed for migraine., Historical      topiramate (TOPAMAX) 200 MG tablet [TOPIRAMATE (TOPAMAX) 200 MG TABLET] Take 200 mg by mouth 2 (two) times a day., Historical      traMADol (ULTRAM) 50 MG tablet Take 50 mg by mouth every 6 hours as needed for severe pain, Historical      triamcinolone (KENALOG) 0.1 % ointment [TRIAMCINOLONE (KENALOG) 0.1 % OINTMENT] Apply 1 application topically 2 (two) times a day.Historical       !! - Potential duplicate medications found. Please discuss with provider.                Consultations:     Consultation during this admission received from internal medicine.  No medical intervention was indicated.                         Significant Results:     None             Pending Results:     None           Discharge Instructions and Follow-Up:     Discharge diet: Regular   Discharge activity:  Activity as tolerated avoid bending, lifting and twisting. Avoid lifting greater than 10lbs for 6 weeks, then advance as able   Discharge follow-up: Follow up with Dr. Alba in 6 weeks as outlined in pre op paperwork.    Outpatient therapy: None    Home Care agency: None    Supplies and equipment: Supplies from OT and PT as appropriate   Lines and drains: None    Wound care: The wound was closed with suture and dermabond glue. The incision can get wet, avoid submersion for 3 weeks. If you were prescribed a brace wear as directed.   Other instructions: Call the office at [phone] or [phone]  for post op issues.               Dr. Dylan Alba  [facility]

## 2024-09-12 ENCOUNTER — HOSPITAL ENCOUNTER (INPATIENT)
Facility: HOSPITAL | Age: 64
LOS: 3 days | Discharge: HOME | End: 2024-09-15
Attending: EMERGENCY MEDICINE | Admitting: INTERNAL MEDICINE
Payer: MEDICAID

## 2024-09-12 ENCOUNTER — APPOINTMENT (OUTPATIENT)
Dept: CT IMAGING | Facility: HOSPITAL | Age: 64
End: 2024-09-12
Attending: EMERGENCY MEDICINE
Payer: MEDICAID

## 2024-09-12 DIAGNOSIS — K21.00 GASTROESOPHAGEAL REFLUX DISEASE WITH ESOPHAGITIS WITHOUT HEMORRHAGE: ICD-10-CM

## 2024-09-12 DIAGNOSIS — F25.9 SCHIZOAFFECTIVE DISORDER, CHRONIC CONDITION (H): ICD-10-CM

## 2024-09-12 DIAGNOSIS — K56.609 SMALL BOWEL OBSTRUCTION (H): ICD-10-CM

## 2024-09-12 DIAGNOSIS — K59.00 CONSTIPATION, UNSPECIFIED CONSTIPATION TYPE: ICD-10-CM

## 2024-09-12 DIAGNOSIS — G40.909 CHRONIC SEIZURE DISORDER WITH HISTORY OF HEAD TRAUMA (H): Primary | ICD-10-CM

## 2024-09-12 DIAGNOSIS — Z87.828 CHRONIC SEIZURE DISORDER WITH HISTORY OF HEAD TRAUMA (H): Primary | ICD-10-CM

## 2024-09-12 PROBLEM — K50.919 CROHN'S DISEASE WITH COMPLICATION, UNSPECIFIED GASTROINTESTINAL TRACT LOCATION (H): Status: ACTIVE | Noted: 2024-03-05

## 2024-09-12 LAB
ABO/RH(D): NORMAL
ALBUMIN SERPL BCG-MCNC: 3.8 G/DL (ref 3.5–5.2)
ALP SERPL-CCNC: 147 U/L (ref 40–150)
ALT SERPL W P-5'-P-CCNC: 20 U/L (ref 0–50)
ANION GAP SERPL CALCULATED.3IONS-SCNC: 9 MMOL/L (ref 7–15)
ANTIBODY SCREEN: NEGATIVE
AST SERPL W P-5'-P-CCNC: 13 U/L (ref 0–45)
BILIRUB SERPL-MCNC: 0.2 MG/DL
BUN SERPL-MCNC: 13.2 MG/DL (ref 8–23)
CALCIUM SERPL-MCNC: 10 MG/DL (ref 8.8–10.4)
CHLORIDE SERPL-SCNC: 102 MMOL/L (ref 98–107)
CREAT SERPL-MCNC: 0.59 MG/DL (ref 0.51–0.95)
EGFRCR SERPLBLD CKD-EPI 2021: >90 ML/MIN/1.73M2
GLUCOSE SERPL-MCNC: 188 MG/DL (ref 70–99)
HCO3 SERPL-SCNC: 22 MMOL/L (ref 22–29)
HOLD SPECIMEN: NORMAL
HOLD SPECIMEN: NORMAL
INR PPP: 0.94 (ref 0.85–1.15)
POTASSIUM SERPL-SCNC: 3.4 MMOL/L (ref 3.4–5.3)
PROT SERPL-MCNC: 6.9 G/DL (ref 6.4–8.3)
SODIUM SERPL-SCNC: 133 MMOL/L (ref 135–145)
SPECIMEN EXPIRATION DATE: NORMAL

## 2024-09-12 PROCEDURE — 96374 THER/PROPH/DIAG INJ IV PUSH: CPT

## 2024-09-12 PROCEDURE — 250N000009 HC RX 250: Performed by: EMERGENCY MEDICINE

## 2024-09-12 PROCEDURE — 250N000013 HC RX MED GY IP 250 OP 250 PS 637: Performed by: EMERGENCY MEDICINE

## 2024-09-12 PROCEDURE — 85027 COMPLETE CBC AUTOMATED: CPT | Performed by: EMERGENCY MEDICINE

## 2024-09-12 PROCEDURE — 36415 COLL VENOUS BLD VENIPUNCTURE: CPT | Performed by: STUDENT IN AN ORGANIZED HEALTH CARE EDUCATION/TRAINING PROGRAM

## 2024-09-12 PROCEDURE — 120N000001 HC R&B MED SURG/OB

## 2024-09-12 PROCEDURE — 85610 PROTHROMBIN TIME: CPT | Performed by: EMERGENCY MEDICINE

## 2024-09-12 PROCEDURE — 99285 EMERGENCY DEPT VISIT HI MDM: CPT | Mod: 25

## 2024-09-12 PROCEDURE — 85007 BL SMEAR W/DIFF WBC COUNT: CPT | Performed by: EMERGENCY MEDICINE

## 2024-09-12 PROCEDURE — 5A09357 ASSISTANCE WITH RESPIRATORY VENTILATION, LESS THAN 24 CONSECUTIVE HOURS, CONTINUOUS POSITIVE AIRWAY PRESSURE: ICD-10-PCS | Performed by: INTERNAL MEDICINE

## 2024-09-12 PROCEDURE — 85610 PROTHROMBIN TIME: CPT | Performed by: STUDENT IN AN ORGANIZED HEALTH CARE EDUCATION/TRAINING PROGRAM

## 2024-09-12 PROCEDURE — 82040 ASSAY OF SERUM ALBUMIN: CPT | Performed by: STUDENT IN AN ORGANIZED HEALTH CARE EDUCATION/TRAINING PROGRAM

## 2024-09-12 PROCEDURE — 74176 CT ABD & PELVIS W/O CONTRAST: CPT

## 2024-09-12 PROCEDURE — 99223 1ST HOSP IP/OBS HIGH 75: CPT | Performed by: INTERNAL MEDICINE

## 2024-09-12 PROCEDURE — 80053 COMPREHEN METABOLIC PANEL: CPT | Performed by: EMERGENCY MEDICINE

## 2024-09-12 PROCEDURE — 86900 BLOOD TYPING SEROLOGIC ABO: CPT | Performed by: EMERGENCY MEDICINE

## 2024-09-12 RX ORDER — TRAMADOL HYDROCHLORIDE 50 MG/1
50 TABLET ORAL ONCE
Status: COMPLETED | OUTPATIENT
Start: 2024-09-12 | End: 2024-09-12

## 2024-09-12 RX ORDER — SODIUM CHLORIDE 9 MG/ML
INJECTION, SOLUTION INTRAVENOUS CONTINUOUS
Status: DISCONTINUED | OUTPATIENT
Start: 2024-09-12 | End: 2024-09-15 | Stop reason: HOSPADM

## 2024-09-12 RX ORDER — MORPHINE SULFATE 4 MG/ML
4 INJECTION, SOLUTION INTRAMUSCULAR; INTRAVENOUS
Status: DISCONTINUED | OUTPATIENT
Start: 2024-09-12 | End: 2024-09-13

## 2024-09-12 RX ADMIN — TRAMADOL HYDROCHLORIDE 50 MG: 50 TABLET, COATED ORAL at 22:02

## 2024-09-12 RX ADMIN — PANTOPRAZOLE SODIUM 40 MG: 40 INJECTION, POWDER, FOR SOLUTION INTRAVENOUS at 22:03

## 2024-09-12 ASSESSMENT — ACTIVITIES OF DAILY LIVING (ADL)
ADLS_ACUITY_SCORE: 38
ADLS_ACUITY_SCORE: 38

## 2024-09-12 ASSESSMENT — COLUMBIA-SUICIDE SEVERITY RATING SCALE - C-SSRS
2. HAVE YOU ACTUALLY HAD ANY THOUGHTS OF KILLING YOURSELF IN THE PAST MONTH?: NO
1. IN THE PAST MONTH, HAVE YOU WISHED YOU WERE DEAD OR WISHED YOU COULD GO TO SLEEP AND NOT WAKE UP?: NO
6. HAVE YOU EVER DONE ANYTHING, STARTED TO DO ANYTHING, OR PREPARED TO DO ANYTHING TO END YOUR LIFE?: NO

## 2024-09-13 ENCOUNTER — APPOINTMENT (OUTPATIENT)
Dept: RADIOLOGY | Facility: HOSPITAL | Age: 64
End: 2024-09-13
Attending: PHYSICIAN ASSISTANT
Payer: MEDICAID

## 2024-09-13 LAB
ALBUMIN SERPL BCG-MCNC: 3.1 G/DL (ref 3.5–5.2)
ALP SERPL-CCNC: 153 U/L (ref 40–150)
ALT SERPL W P-5'-P-CCNC: 58 U/L (ref 0–50)
ANION GAP SERPL CALCULATED.3IONS-SCNC: 7 MMOL/L (ref 7–15)
AST SERPL W P-5'-P-CCNC: 146 U/L (ref 0–45)
BASOPHILS # BLD AUTO: 0.1 10E3/UL (ref 0–0.2)
BASOPHILS # BLD MANUAL: 0 10E3/UL (ref 0–0.2)
BASOPHILS NFR BLD AUTO: 1 %
BASOPHILS NFR BLD MANUAL: 0 %
BILIRUB SERPL-MCNC: 0.3 MG/DL
BUN SERPL-MCNC: 11.8 MG/DL (ref 8–23)
CALCIUM SERPL-MCNC: 9.4 MG/DL (ref 8.8–10.4)
CHLORIDE SERPL-SCNC: 108 MMOL/L (ref 98–107)
CREAT SERPL-MCNC: 0.62 MG/DL (ref 0.51–0.95)
EGFRCR SERPLBLD CKD-EPI 2021: >90 ML/MIN/1.73M2
EOSINOPHIL # BLD AUTO: 0.3 10E3/UL (ref 0–0.7)
EOSINOPHIL # BLD MANUAL: 0.3 10E3/UL (ref 0–0.7)
EOSINOPHIL NFR BLD AUTO: 2 %
EOSINOPHIL NFR BLD MANUAL: 2 %
ERYTHROCYTE [DISTWIDTH] IN BLOOD BY AUTOMATED COUNT: 13.7 % (ref 10–15)
ERYTHROCYTE [DISTWIDTH] IN BLOOD BY AUTOMATED COUNT: 13.9 % (ref 10–15)
GLUCOSE BLDC GLUCOMTR-MCNC: 110 MG/DL (ref 70–99)
GLUCOSE BLDC GLUCOMTR-MCNC: 114 MG/DL (ref 70–99)
GLUCOSE BLDC GLUCOMTR-MCNC: 141 MG/DL (ref 70–99)
GLUCOSE BLDC GLUCOMTR-MCNC: 95 MG/DL (ref 70–99)
GLUCOSE SERPL-MCNC: 85 MG/DL (ref 70–99)
HCO3 SERPL-SCNC: 20 MMOL/L (ref 22–29)
HCT VFR BLD AUTO: 42.6 % (ref 35–47)
HCT VFR BLD AUTO: 44.2 % (ref 35–47)
HGB BLD-MCNC: 13 G/DL (ref 11.7–15.7)
HGB BLD-MCNC: 13 G/DL (ref 11.7–15.7)
HGB BLD-MCNC: 14.1 G/DL (ref 11.7–15.7)
HGB BLD-MCNC: 15 G/DL (ref 11.7–15.7)
IMM GRANULOCYTES # BLD: 0.1 10E3/UL
IMM GRANULOCYTES NFR BLD: 1 %
LYMPHOCYTES # BLD AUTO: 3 10E3/UL (ref 0.8–5.3)
LYMPHOCYTES # BLD MANUAL: 3.8 10E3/UL (ref 0.8–5.3)
LYMPHOCYTES NFR BLD AUTO: 25 %
LYMPHOCYTES NFR BLD MANUAL: 23 %
MAGNESIUM SERPL-MCNC: 1.7 MG/DL (ref 1.7–2.3)
MAGNESIUM SERPL-MCNC: 1.7 MG/DL (ref 1.7–2.3)
MCH RBC QN AUTO: 31.7 PG (ref 26.5–33)
MCH RBC QN AUTO: 31.8 PG (ref 26.5–33)
MCHC RBC AUTO-ENTMCNC: 33.1 G/DL (ref 31.5–36.5)
MCHC RBC AUTO-ENTMCNC: 33.9 G/DL (ref 31.5–36.5)
MCV RBC AUTO: 93 FL (ref 78–100)
MCV RBC AUTO: 96 FL (ref 78–100)
MONOCYTES # BLD AUTO: 1.5 10E3/UL (ref 0–1.3)
MONOCYTES # BLD MANUAL: 2.3 10E3/UL (ref 0–1.3)
MONOCYTES NFR BLD AUTO: 13 %
MONOCYTES NFR BLD MANUAL: 14 %
NEUTROPHILS # BLD AUTO: 7.2 10E3/UL (ref 1.6–8.3)
NEUTROPHILS # BLD MANUAL: 10.2 10E3/UL (ref 1.6–8.3)
NEUTROPHILS NFR BLD AUTO: 59 %
NEUTROPHILS NFR BLD MANUAL: 61 %
NRBC # BLD AUTO: 0 10E3/UL
NRBC # BLD AUTO: 0 10E3/UL
NRBC BLD AUTO-RTO: 0 /100
NRBC BLD AUTO-RTO: 0 /100
PATH REV: ABNORMAL
PLAT MORPH BLD: ABNORMAL
PLATELET # BLD AUTO: 196 10E3/UL (ref 150–450)
PLATELET # BLD AUTO: 249 10E3/UL (ref 150–450)
POTASSIUM SERPL-SCNC: 3.6 MMOL/L (ref 3.4–5.3)
PROT SERPL-MCNC: 5.9 G/DL (ref 6.4–8.3)
RBC # BLD AUTO: 4.44 10E6/UL (ref 3.8–5.2)
RBC # BLD AUTO: 4.73 10E6/UL (ref 3.8–5.2)
RBC MORPH BLD: ABNORMAL
SODIUM SERPL-SCNC: 135 MMOL/L (ref 135–145)
WBC # BLD AUTO: 12.3 10E3/UL (ref 4–11)
WBC # BLD AUTO: 16.7 10E3/UL (ref 4–11)

## 2024-09-13 PROCEDURE — 250N000013 HC RX MED GY IP 250 OP 250 PS 637: Performed by: STUDENT IN AN ORGANIZED HEALTH CARE EDUCATION/TRAINING PROGRAM

## 2024-09-13 PROCEDURE — 82962 GLUCOSE BLOOD TEST: CPT

## 2024-09-13 PROCEDURE — 83735 ASSAY OF MAGNESIUM: CPT | Performed by: STUDENT IN AN ORGANIZED HEALTH CARE EDUCATION/TRAINING PROGRAM

## 2024-09-13 PROCEDURE — 85018 HEMOGLOBIN: CPT | Performed by: INTERNAL MEDICINE

## 2024-09-13 PROCEDURE — 250N000009 HC RX 250: Performed by: INTERNAL MEDICINE

## 2024-09-13 PROCEDURE — 99221 1ST HOSP IP/OBS SF/LOW 40: CPT | Performed by: SURGERY

## 2024-09-13 PROCEDURE — 250N000011 HC RX IP 250 OP 636: Performed by: INTERNAL MEDICINE

## 2024-09-13 PROCEDURE — 74019 RADEX ABDOMEN 2 VIEWS: CPT

## 2024-09-13 PROCEDURE — 258N000003 HC RX IP 258 OP 636: Performed by: STUDENT IN AN ORGANIZED HEALTH CARE EDUCATION/TRAINING PROGRAM

## 2024-09-13 PROCEDURE — 36415 COLL VENOUS BLD VENIPUNCTURE: CPT | Performed by: INTERNAL MEDICINE

## 2024-09-13 PROCEDURE — 80053 COMPREHEN METABOLIC PANEL: CPT | Performed by: INTERNAL MEDICINE

## 2024-09-13 PROCEDURE — 250N000011 HC RX IP 250 OP 636: Mod: JZ | Performed by: EMERGENCY MEDICINE

## 2024-09-13 PROCEDURE — 99233 SBSQ HOSP IP/OBS HIGH 50: CPT | Performed by: STUDENT IN AN ORGANIZED HEALTH CARE EDUCATION/TRAINING PROGRAM

## 2024-09-13 PROCEDURE — 258N000003 HC RX IP 258 OP 636: Performed by: EMERGENCY MEDICINE

## 2024-09-13 PROCEDURE — 120N000001 HC R&B MED SURG/OB

## 2024-09-13 PROCEDURE — 258N000003 HC RX IP 258 OP 636: Performed by: INTERNAL MEDICINE

## 2024-09-13 PROCEDURE — 71045 X-RAY EXAM CHEST 1 VIEW: CPT

## 2024-09-13 PROCEDURE — 85041 AUTOMATED RBC COUNT: CPT | Performed by: INTERNAL MEDICINE

## 2024-09-13 RX ORDER — LISINOPRIL 5 MG/1
10 TABLET ORAL DAILY
Status: DISCONTINUED | OUTPATIENT
Start: 2024-09-13 | End: 2024-09-15

## 2024-09-13 RX ORDER — DEXTROSE MONOHYDRATE 25 G/50ML
25-50 INJECTION, SOLUTION INTRAVENOUS
Status: DISCONTINUED | OUTPATIENT
Start: 2024-09-13 | End: 2024-09-15 | Stop reason: HOSPADM

## 2024-09-13 RX ORDER — IPRATROPIUM BROMIDE AND ALBUTEROL SULFATE 2.5; .5 MG/3ML; MG/3ML
1 SOLUTION RESPIRATORY (INHALATION) EVERY 6 HOURS PRN
COMMUNITY

## 2024-09-13 RX ORDER — NICOTINE POLACRILEX 4 MG
15-30 LOZENGE BUCCAL
Status: DISCONTINUED | OUTPATIENT
Start: 2024-09-13 | End: 2024-09-15 | Stop reason: HOSPADM

## 2024-09-13 RX ORDER — LIDOCAINE 4 G/G
1 PATCH TOPICAL EVERY 24 HOURS
COMMUNITY

## 2024-09-13 RX ORDER — SODIUM CHLORIDE, SODIUM LACTATE, POTASSIUM CHLORIDE, CALCIUM CHLORIDE 600; 310; 30; 20 MG/100ML; MG/100ML; MG/100ML; MG/100ML
INJECTION, SOLUTION INTRAVENOUS ONCE
Status: COMPLETED | OUTPATIENT
Start: 2024-09-13 | End: 2024-09-13

## 2024-09-13 RX ORDER — AMOXICILLIN 250 MG
1 CAPSULE ORAL 2 TIMES DAILY PRN
Status: DISCONTINUED | OUTPATIENT
Start: 2024-09-13 | End: 2024-09-15 | Stop reason: HOSPADM

## 2024-09-13 RX ORDER — MONTELUKAST SODIUM 10 MG/1
10 TABLET ORAL AT BEDTIME
COMMUNITY

## 2024-09-13 RX ORDER — ATROPINE SULFATE 10 MG/ML
1 SOLUTION/ DROPS OPHTHALMIC AT BEDTIME
COMMUNITY

## 2024-09-13 RX ORDER — NICOTINE 21 MG/24HR
1 PATCH, TRANSDERMAL 24 HOURS TRANSDERMAL EVERY 24 HOURS
COMMUNITY

## 2024-09-13 RX ORDER — ASPIRIN 81 MG/1
81 TABLET ORAL DAILY
COMMUNITY

## 2024-09-13 RX ORDER — ALBUTEROL SULFATE 0.83 MG/ML
2.5 SOLUTION RESPIRATORY (INHALATION)
Status: DISCONTINUED | OUTPATIENT
Start: 2024-09-13 | End: 2024-09-13

## 2024-09-13 RX ORDER — DEXTROSE, SODIUM CHLORIDE, SODIUM LACTATE, POTASSIUM CHLORIDE, AND CALCIUM CHLORIDE 5; .6; .31; .03; .02 G/100ML; G/100ML; G/100ML; G/100ML; G/100ML
INJECTION, SOLUTION INTRAVENOUS CONTINUOUS
Status: DISCONTINUED | OUTPATIENT
Start: 2024-09-13 | End: 2024-09-13

## 2024-09-13 RX ORDER — NITROGLYCERIN 0.4 MG/1
0.4 TABLET SUBLINGUAL EVERY 5 MIN PRN
Status: DISCONTINUED | OUTPATIENT
Start: 2024-09-13 | End: 2024-09-15 | Stop reason: HOSPADM

## 2024-09-13 RX ORDER — NICOTINE 21 MG/24HR
1 PATCH, TRANSDERMAL 24 HOURS TRANSDERMAL EVERY 24 HOURS
Status: DISCONTINUED | OUTPATIENT
Start: 2024-09-14 | End: 2024-09-15 | Stop reason: HOSPADM

## 2024-09-13 RX ORDER — AMOXICILLIN 250 MG
2 CAPSULE ORAL 2 TIMES DAILY PRN
Status: DISCONTINUED | OUTPATIENT
Start: 2024-09-13 | End: 2024-09-15 | Stop reason: HOSPADM

## 2024-09-13 RX ORDER — CLOZAPINE 25 MG/1
25 TABLET ORAL AT BEDTIME
Status: DISCONTINUED | OUTPATIENT
Start: 2024-09-13 | End: 2024-09-15 | Stop reason: DRUGHIGH

## 2024-09-13 RX ORDER — HYDRALAZINE HYDROCHLORIDE 20 MG/ML
5 INJECTION INTRAMUSCULAR; INTRAVENOUS EVERY 4 HOURS PRN
Status: DISCONTINUED | OUTPATIENT
Start: 2024-09-13 | End: 2024-09-15 | Stop reason: HOSPADM

## 2024-09-13 RX ORDER — NICOTINE POLACRILEX 4 MG
15-30 LOZENGE BUCCAL
Status: DISCONTINUED | OUTPATIENT
Start: 2024-09-13 | End: 2024-09-13

## 2024-09-13 RX ORDER — TOPIRAMATE 100 MG/1
200 TABLET, FILM COATED ORAL 2 TIMES DAILY
Status: DISCONTINUED | OUTPATIENT
Start: 2024-09-13 | End: 2024-09-15

## 2024-09-13 RX ORDER — IPRATROPIUM BROMIDE AND ALBUTEROL SULFATE 2.5; .5 MG/3ML; MG/3ML
1 SOLUTION RESPIRATORY (INHALATION) EVERY 6 HOURS PRN
Status: DISCONTINUED | OUTPATIENT
Start: 2024-09-13 | End: 2024-09-15 | Stop reason: HOSPADM

## 2024-09-13 RX ORDER — ALBUTEROL SULFATE 90 UG/1
1-2 AEROSOL, METERED RESPIRATORY (INHALATION) EVERY 4 HOURS PRN
Status: DISCONTINUED | OUTPATIENT
Start: 2024-09-13 | End: 2024-09-15 | Stop reason: HOSPADM

## 2024-09-13 RX ORDER — ALBUTEROL SULFATE 90 UG/1
1-2 AEROSOL, METERED RESPIRATORY (INHALATION) EVERY 4 HOURS PRN
COMMUNITY

## 2024-09-13 RX ORDER — NITROGLYCERIN 0.4 MG/1
0.4 TABLET SUBLINGUAL EVERY 5 MIN PRN
COMMUNITY

## 2024-09-13 RX ORDER — LIDOCAINE 40 MG/G
CREAM TOPICAL
Status: DISCONTINUED | OUTPATIENT
Start: 2024-09-13 | End: 2024-09-15 | Stop reason: HOSPADM

## 2024-09-13 RX ORDER — LEVALBUTEROL TARTRATE 45 UG/1
1-2 AEROSOL, METERED ORAL EVERY 4 HOURS PRN
COMMUNITY

## 2024-09-13 RX ORDER — BLOOD SUGAR DIAGNOSTIC
STRIP MISCELLANEOUS
COMMUNITY

## 2024-09-13 RX ORDER — ONDANSETRON 4 MG/1
4 TABLET, ORALLY DISINTEGRATING ORAL EVERY 8 HOURS PRN
COMMUNITY

## 2024-09-13 RX ORDER — LANCETS
EACH MISCELLANEOUS
COMMUNITY

## 2024-09-13 RX ORDER — CLOZAPINE 100 MG/1
200 TABLET ORAL AT BEDTIME
Status: DISCONTINUED | OUTPATIENT
Start: 2024-09-13 | End: 2024-09-13

## 2024-09-13 RX ORDER — CALCIUM CARBONATE 500 MG/1
1000 TABLET, CHEWABLE ORAL 4 TIMES DAILY PRN
Status: DISCONTINUED | OUTPATIENT
Start: 2024-09-13 | End: 2024-09-15 | Stop reason: HOSPADM

## 2024-09-13 RX ORDER — ASPIRIN 81 MG/1
81 TABLET ORAL DAILY
Status: DISCONTINUED | OUTPATIENT
Start: 2024-09-13 | End: 2024-09-15 | Stop reason: HOSPADM

## 2024-09-13 RX ORDER — FLUTICASONE PROPIONATE 50 MCG
2 SPRAY, SUSPENSION (ML) NASAL DAILY
Status: DISCONTINUED | OUTPATIENT
Start: 2024-09-14 | End: 2024-09-15 | Stop reason: HOSPADM

## 2024-09-13 RX ORDER — LANOLIN ALCOHOL/MO/W.PET/CERES
3 CREAM (GRAM) TOPICAL
COMMUNITY

## 2024-09-13 RX ORDER — DEXTROSE MONOHYDRATE 25 G/50ML
25-50 INJECTION, SOLUTION INTRAVENOUS
Status: DISCONTINUED | OUTPATIENT
Start: 2024-09-13 | End: 2024-09-13

## 2024-09-13 RX ORDER — MONTELUKAST SODIUM 10 MG/1
10 TABLET ORAL AT BEDTIME
Status: DISCONTINUED | OUTPATIENT
Start: 2024-09-13 | End: 2024-09-15 | Stop reason: HOSPADM

## 2024-09-13 RX ORDER — POTASSIUM CHLORIDE 1500 MG/1
20 TABLET, EXTENDED RELEASE ORAL ONCE
Status: COMPLETED | OUTPATIENT
Start: 2024-09-13 | End: 2024-09-13

## 2024-09-13 RX ORDER — MORPHINE SULFATE 2 MG/ML
2 INJECTION, SOLUTION INTRAMUSCULAR; INTRAVENOUS EVERY 4 HOURS PRN
Status: DISCONTINUED | OUTPATIENT
Start: 2024-09-13 | End: 2024-09-15 | Stop reason: HOSPADM

## 2024-09-13 RX ADMIN — MONTELUKAST 10 MG: 10 TABLET, FILM COATED ORAL at 21:39

## 2024-09-13 RX ADMIN — MORPHINE SULFATE 4 MG: 4 INJECTION, SOLUTION INTRAMUSCULAR; INTRAVENOUS at 00:56

## 2024-09-13 RX ADMIN — SODIUM CHLORIDE: 9 INJECTION, SOLUTION INTRAVENOUS at 11:36

## 2024-09-13 RX ADMIN — MORPHINE SULFATE 2 MG: 2 INJECTION, SOLUTION INTRAMUSCULAR; INTRAVENOUS at 23:31

## 2024-09-13 RX ADMIN — CLOZAPINE 25 MG: 25 TABLET ORAL at 21:40

## 2024-09-13 RX ADMIN — MORPHINE SULFATE 4 MG: 4 INJECTION, SOLUTION INTRAMUSCULAR; INTRAVENOUS at 09:27

## 2024-09-13 RX ADMIN — SODIUM CHLORIDE, SODIUM LACTATE, POTASSIUM CHLORIDE, CALCIUM CHLORIDE AND DEXTROSE MONOHYDRATE: 5; 600; 310; 30; 20 INJECTION, SOLUTION INTRAVENOUS at 00:57

## 2024-09-13 RX ADMIN — SODIUM CHLORIDE: 9 INJECTION, SOLUTION INTRAVENOUS at 02:53

## 2024-09-13 RX ADMIN — SODIUM CHLORIDE: 9 INJECTION, SOLUTION INTRAVENOUS at 21:48

## 2024-09-13 RX ADMIN — PANTOPRAZOLE SODIUM 40 MG: 40 INJECTION, POWDER, FOR SOLUTION INTRAVENOUS at 08:09

## 2024-09-13 RX ADMIN — MORPHINE SULFATE 2 MG: 2 INJECTION, SOLUTION INTRAMUSCULAR; INTRAVENOUS at 16:49

## 2024-09-13 RX ADMIN — MORPHINE SULFATE 2 MG: 2 INJECTION, SOLUTION INTRAMUSCULAR; INTRAVENOUS at 05:04

## 2024-09-13 RX ADMIN — POTASSIUM CHLORIDE 20 MEQ: 1500 TABLET, EXTENDED RELEASE ORAL at 11:28

## 2024-09-13 RX ADMIN — SODIUM CHLORIDE: 9 INJECTION, SOLUTION INTRAVENOUS at 09:31

## 2024-09-13 RX ADMIN — PANTOPRAZOLE SODIUM 40 MG: 40 INJECTION, POWDER, FOR SOLUTION INTRAVENOUS at 21:38

## 2024-09-13 RX ADMIN — TOPIRAMATE 200 MG: 100 TABLET, FILM COATED ORAL at 21:38

## 2024-09-13 RX ADMIN — IPRATROPIUM BROMIDE 1 PUFF: 17 AEROSOL, METERED RESPIRATORY (INHALATION) at 21:50

## 2024-09-13 RX ADMIN — SODIUM CHLORIDE, POTASSIUM CHLORIDE, SODIUM LACTATE AND CALCIUM CHLORIDE 500 ML: 600; 310; 30; 20 INJECTION, SOLUTION INTRAVENOUS at 08:10

## 2024-09-13 RX ADMIN — MORPHINE SULFATE 4 MG: 4 INJECTION, SOLUTION INTRAMUSCULAR; INTRAVENOUS at 13:48

## 2024-09-13 RX ADMIN — SODIUM CHLORIDE, POTASSIUM CHLORIDE, SODIUM LACTATE AND CALCIUM CHLORIDE: 600; 310; 30; 20 INJECTION, SOLUTION INTRAVENOUS at 05:28

## 2024-09-13 RX ADMIN — MORPHINE SULFATE 4 MG: 4 INJECTION, SOLUTION INTRAMUSCULAR; INTRAVENOUS at 11:28

## 2024-09-13 ASSESSMENT — ACTIVITIES OF DAILY LIVING (ADL)
ADLS_ACUITY_SCORE: 40
ADLS_ACUITY_SCORE: 38
ADLS_ACUITY_SCORE: 40
ADLS_ACUITY_SCORE: 40
ADLS_ACUITY_SCORE: 38
ADLS_ACUITY_SCORE: 38
ADLS_ACUITY_SCORE: 40
ADLS_ACUITY_SCORE: 38
ADLS_ACUITY_SCORE: 40

## 2024-09-13 NOTE — CONSULTS
"[facility] Consultation     Rukhsana Griffith  [address]   [facility] [address]  63 year old female     Admission Date/Time: 9/12/2024  Primary Care Provider: Kei De Leon  Referring / Attending Physician:  Vangie     We were asked to see the patient in consultation by Dr. Pollard for evaluation of \"upper GIB.\"       CC: abdominal pain, vomiting     HPI:  Rukhsana Griffith is a 63 year old female with PMH HTN, type 2 diabetes, seizure disorder, COPD, CVA, GERD, gastroparesis s/p gastric neurostimulator placement, IBS-C, chronic pain, schizoaffective disorder, (no documented Crohn's disease through [facility]) who presented to ER 9/12 for black, tarry stool and coffee-ground emesis. She reports that she has been feeling well but starting yesterday she had abdominal pain, mostly in the RLQ and nausea/vomiting. She states her emesis was initially food, then bile, then she noted blood in it. She denies being on anything for reflux but omeprazole is on her med list. She denies GERD symptoms or recent constipation if she takes her Miralax 17 g daily. She denied melena/hematochezia to me but per chart she reported black stool.     Last EGD/colon 4/2021 at [facility] with gastric erythema, otherwise negative EGD, multifocal atrophic gastritis on biopsy. Colonoscopy with poor prep. She denies NSAID use, minimal tobacco, no alcohol or marijuana use. She does not know her family history stating she has no family (adopted per chart).      ROS: A comprehensive ten point review of systems was negative aside from those in mentioned in the HPI.      PAST MEDICAL HISTORY:  Patient Active Problem List    Diagnosis Date Noted    Small bowel obstruction (H) 09/12/2024     Priority: Medium    Spinal stenosis of lumbar region with neurogenic claudication 05/24/2024     Priority: Medium    Degenerative lumbar spinal stenosis 05/23/2024     Priority: Medium    Crohn's disease with complication, unspecified " gastrointestinal tract location (H) 03/05/2024     Priority: Medium    Episode of loss of consciousness 09/12/2023     Priority: Medium    Chronic seizure disorder with history of head trauma (H) 09/12/2023     Priority: Medium    Hypoxia 06/24/2023     Priority: Medium    COPD exacerbation (H) 06/04/2023     Priority: Medium    Tension headache 04/13/2023     Priority: Medium    Atypical chest pain 04/13/2023     Priority: Medium    COPD mixed type (H) 12/19/2022     Priority: Medium    Iron deficiency anemia 12/19/2022     Priority: Medium     Formatting of this note might be different from the original.  EGD negative 6/07, heme + 8/08      Right lumbar radiculopathy 12/19/2022     Priority: Medium    History of methamphetamine abuse (H) 07/28/2022     Priority: Medium    Prolonged Q-T interval on ECG 06/16/2022     Priority: Medium    Left homonymous hemianopsia 05/26/2022     Priority: Medium    Retrobulbar neuritis of both eyes 05/26/2022     Priority: Medium    Altered mental status, unspecified 05/14/2022     Priority: Medium    Mixed conductive and sensorineural hearing loss of right ear with restricted hearing of left ear 04/26/2022     Priority: Medium    Otalgia, right 04/26/2022     Priority: Medium    Sensorineural hearing loss (SNHL) of left ear with restricted hearing of right ear 04/26/2022     Priority: Medium    Stress incontinence of urine 09/13/2021     Priority: Medium    Weakness of right foot 09/13/2021     Priority: Medium    Occipital neuralgia of left side 02/07/2020     Priority: Medium    Pelvic and perineal pain 04/02/2019     Priority: Medium    Calcified granuloma of lung (H) 11/19/2018     Priority: Medium     Formatting of this note might be different from the original.  Neg Quant gold 2014, granulomas in liver as well      Perennial allergic rhinitis 06/28/2018     Priority: Medium    Microcytic hypochromic anemia 02/04/2018     Priority: Medium    Delayed gastric emptying  02/04/2018     Priority: Medium    Abdominal pain, generalized 02/04/2018     Priority: Medium    Hypoglycemia 02/04/2018     Priority: Medium    Psychophysiological insomnia 11/10/2017     Priority: Medium    Chest pain 09/03/2017     Priority: Medium    Personality disorder (H) 08/23/2016     Priority: Medium    Spasm 07/21/2016     Priority: Medium    Tobacco abuse 06/30/2016     Priority: Medium    Vitamin D deficiency 06/14/2016     Priority: Medium    Increased frequency of urination 01/26/2016     Priority: Medium    Overactive bladder 08/03/2015     Priority: Medium    Unspecified visual disturbance 10/24/2014     Priority: Medium     Formatting of this note might be different from the original.   Side effect of meds.      Peripheral neuropathy 05/09/2014     Priority: Medium    Diabetic gastroparesis (H) 09/23/2013     Priority: Medium     Formatting of this note might be different from the original.  S/p placement of gastric neurostimulator      Anxiety 08/14/2013     Priority: Medium    Unilateral weakness 09/15/2012     Priority: Medium    Hypertension 09/06/2012     Priority: Medium    Dyslipidemia, goal LDL below 100 01/02/2012     Priority: Medium    Seizure disorder, grand mal (H) 07/23/2011     Priority: Medium    Chronic hyponatremia 01/21/2011     Priority: Medium    Asthma 10/17/2010     Priority: Medium     Formatting of this note might be different from the original.  Per review of hx appears mild to moderate persistent.  On advair      Irritable bowel syndrome 10/17/2010     Priority: Medium     Formatting of this note might be different from the original.  Chronic LLQ abdominal pain with frequent diarrhea/constipation, nausea/vomiting, and history of hemorrhoids.  Blood in her stool 3-4 times/mo. at baseline.  Chronic inflammation seen of colon from transverse to descending/signmoid.  Colonoscopy in 8/2009 was normal with multiple biopsies taken.      Neutrophilic leukocytosis 10/17/2010      Priority: Medium     Formatting of this note might be different from the original.  Peripheral smears (7/2-1- and 7/2008) confirm iron deficiency anemia (2010)+ likely reactive leukocytosis.  RANDA-2 negative, on clozapine (risk of leukopenia)      Migraine 02/19/2010     Priority: Medium    Osteoporosis 02/19/2010     Priority: Medium    Schizoaffective disorder, chronic condition (H) 09/14/2007     Priority: Medium     Formatting of this note might be different from the original.      Type II diabetes mellitus with complication (H) 09/14/2007     Priority: Medium     Formatting of this note might be different from the original.  + neuropathy (EMG done 4/2010) and nephropathy.  Follows with Dr. Huffman.  Proteinuria present  Hgb AIC 7.9% Apr 2012  No DR eye exam 8/15; Dr. Rehman, [facility]       SOCIAL HISTORY:  Social History     Tobacco Use    Smoking status: Every Day     Current packs/day: 0.25     Types: Cigarettes    Smokeless tobacco: Never   Substance Use Topics    Alcohol use: Not Currently    Drug use: Not Currently   + tobacco, denies etoh  FAMILY HISTORY:  Family History   Adopted: Yes   Pt adopted, unaware of family history.     ALLERGIES:   Allergies   Allergen Reactions    Aspirin Unknown     Other reaction(s): irritation for stomach takes 81 at home with milk     Bee Sting Kit [Bee Venom] Anaphylaxis    Botox [Onabotulinumtoxina] Hives and Shortness Of Breath    Garlic Hives    Hydrocodone Headache, Hives and Itching    Hydromorphone Other (See Comments) and Rash     Seizures    Hydroxyzine Hives, Other (See Comments), Anaphylaxis and Shortness Of Breath     Dyspnea    Hymenoptera Allergenic Extract [Wasp Venom Protein] Anaphylaxis    Iodinated Contrast Media Hives, Other (See Comments), Itching and Swelling     EDEMA    Latex Shortness Of Breath    Lidocaine Hives     lidoderm patch only; tolerates injectable lidocaine    Onion Hives     green, red, yellow, mushrooms, garlic---all cause  "pt to have hives    Oxycodone Hives and Rash    Procaine Hives     tolerates injectable bupivacaine and lidocaine      Sucralose Anaphylaxis    Diazepam Rash     also: parasomnia, as pt reported waking up in bathtub    Haloperidol Rash    Ketorolac Rash    Lithium Rash    Meperidine Rash    Diatrizoate Meglumine [Diatrizoate] Hives and Swelling    Green Pepper [Capsicum] Hives    Metformin Other (See Comments)     \"Stomach bleeding and kidney infection\"    Adhesive Tape-Silicones [Adhesive Tape] Rash    [place] [Nuts] Rash    Cephalosporins Rash    Chlorpromazine Rash     THORAZINE    Chlorpropamide Rash    Coconut (Cocos Nucifera) Rash    Codeine Headache     Migraines       Darvocet A500 [Propoxyphene N-Apap] Rash    Dipyridamole Rash    Fentanyl Rash    Fluoxetine Rash    Furosemide Rash    Glyburide Other (See Comments) and Rash     Other reaction(s): hypoglycemia (patient is very sensitive to sulfonylureas)    Ondansetron Rash    Penicillins Rash and Other (See Comments)     Migraines, dizzy and sweating    Percocet [Oxycodone-Acetaminophen] Rash    Shellfish Containing Products [Shellfish-Derived Products] Rash    Sulfa (Sulfonamide Antibiotics) [Sulfa Antibiotics] Rash    Thallium-201 [Thallous Chloride Tl 201] Rash    Thioridazine Rash    Tizanidine Rash     MEDICATIONS:   Current Facility-Administered Medications   Medication Dose Route Frequency Provider Last Rate Last Admin    calcium carbonate (TUMS) chewable tablet 1,000 mg  1,000 mg Oral 4x Daily PRN Rosemarie Pollard MD        lidocaine (LMX4) cream   Topical Q1H PRN Rosemarie Pollard MD        lidocaine 1 % 0.1-1 mL  0.1-1 mL Other Q1H PRN Rosemarie Pollard MD        morphine (PF) injection 2 mg  2 mg Intravenous Q4H PRN Rosemarie oPllard MD   2 mg at 09/13/24 0504    morphine (PF) injection 4 mg  4 mg Intravenous Q2H PRN Aaron Moreau MD   4 mg at 09/13/24 0056    pantoprazole (PROTONIX) IV push injection 40 mg  40 mg Intravenous BID Rosemarie Pollard MD   " 40 mg at 09/13/24 0809    senna-docusate (SENOKOT-S/PERICOLACE) 8.6-50 MG per tablet 1 tablet  1 tablet Oral BID PRN Rosemarie Pollard MD        Or    senna-docusate (SENOKOT-S/PERICOLACE) 8.6-50 MG per tablet 2 tablet  2 tablet Oral BID PRN Rosemarie Pollard MD        sodium chloride (PF) 0.9% PF flush 3 mL  3 mL Intracatheter Q8H Rosemarie Pollard MD   3 mL at 09/13/24 0057    sodium chloride (PF) 0.9% PF flush 3 mL  3 mL Intracatheter q1 min prn Rosemarie Pollard MD        sodium chloride 0.9 % infusion   Intravenous Continuous Sovell, Aaron BA MD   Paused at 09/13/24 0810     Current Outpatient Medications   Medication Sig Dispense Refill    acetaminophen (TYLENOL) 160 mg/5 mL (5 mL) Soln solution [ACETAMINOPHEN (TYLENOL) 160 MG/5 ML (5 ML) SOLN SOLUTION] Take 10 mL by mouth every 4 (four) hours as needed (Pain).             albuterol (PROAIR HFA;PROVENTIL HFA;VENTOLIN HFA) 90 mcg/actuation inhaler [ALBUTEROL (PROAIR HFA;PROVENTIL HFA;VENTOLIN HFA) 90 MCG/ACTUATION INHALER] Inhale 2 puffs every 6 (six) hours as needed for wheezing.      albuterol (PROVENTIL) 2.5 mg /3 mL (0.083 %) nebulizer solution [ALBUTEROL (PROVENTIL) 2.5 MG /3 ML (0.083 %) NEBULIZER SOLUTION] Take 2.5 mg by nebulization every 4 (four) hours as needed for wheezing.             aluminum-magnesium hydroxide-simethicone (MAALOX MAX STRENGTH) 400-400-40 mg/5 mL suspension [ALUMINUM-MAGNESIUM HYDROXIDE-SIMETHICONE (MAALOX MAX STRENGTH) 400-400-40 MG/5 ML SUSPENSION] Take 5 mL by mouth every 4 (four) hours as needed for indigestion.             ammonium lactate (AMLACTIN) 12 % cream [AMMONIUM LACTATE (AMLACTIN) 12 % CREAM] Apply 1 application topically 2 (two) times a day.             atropine 1 % ophthalmic solution Place 1-2 drops under the tongue 2 times daily as needed for secretions      budesonide-formoterol (SYMBICORT) 160-4.5 mcg/actuation inhaler [BUDESONIDE-FORMOTEROL (SYMBICORT) 160-4.5 MCG/ACTUATION INHALER] Inhale 2 puffs 2 (two) times a day.              cholecalciferol, vitamin D3, 2,000 unit Tab [CHOLECALCIFEROL, VITAMIN D3, 2,000 UNIT TAB] Take 2,000 Units by mouth daily.      cloZAPine (CLOZARIL) 100 MG tablet [CLOZAPINE (CLOZARIL) 100 MG TABLET] Take 200 mg by mouth at bedtime.             diphenhydrAMINE (BENADRYL) 25 mg capsule [DIPHENHYDRAMINE (BENADRYL) 25 MG CAPSULE] Take 25 mg by mouth at bedtime as needed for itching.      docusate sodium (COLACE) 100 MG capsule [DOCUSATE SODIUM (COLACE) 100 MG CAPSULE] Take 100 mg by mouth 2 (two) times a day.             EPINEPHrine (EPIPEN) 0.3 mg/0.3 mL atIn [EPINEPHRINE (EPIPEN) 0.3 MG/0.3 ML ATIN] Inject 0.3 mg into the shoulder, thigh, or buttocks as needed.      escitalopram oxalate (LEXAPRO) 10 MG tablet Take 15 mg by mouth daily      fluoride, sodium, (DENTAGEL) 1.1 % Gel dental gel [FLUORIDE, SODIUM, (DENTAGEL) 1.1 % GEL DENTAL GEL] Apply 1 application to teeth at bedtime.      fluticasone (FLONASE) 50 MCG/ACT nasal spray Spray 1 spray into both nostrils daily      fluticasone-salmeterol (ADVAIR) 500-50 MCG/ACT inhaler Inhale 1 puff into the lungs 2 times daily      gabapentin (NEURONTIN) 100 MG capsule Take 100 mg by mouth 2 times daily (before meals) 100mg in morning, 100mg with lunch, 300mg at HS      gabapentin (NEURONTIN) 300 MG capsule Take 300 mg by mouth at bedtime 100mg in morning, 100mg with lunch, 300mg at HS      glucagon, human recombinant, (GLUCAGEN HYPOKIT) 1 mg injection [GLUCAGON, HUMAN RECOMBINANT, (GLUCAGEN HYPOKIT) 1 MG INJECTION] Infuse 1 mg into a venous catheter once as needed (Low blood sugars).             insulin aspart (NOVOLOG FLEXPEN) 100 UNIT/ML pen Inject 15 Units Subcutaneous daily (before lunch)      insulin aspart (NOVOLOG PEN) 100 UNIT/ML pen Inject 15 Units Subcutaneous daily (with dinner)      insulin aspart U-100 (NOVOLOG) 100 unit/mL injection Inject 20 Units Subcutaneous daily before breakfast      insulin glargine (LANTUS) 100 unit/mL injection Inject 50  Units Subcutaneous at bedtime      ipratropium-albuterol (DUO-NEB) 0.5-2.5 mg/3 mL nebulizer [IPRATROPIUM-ALBUTEROL (DUO-NEB) 0.5-2.5 MG/3 ML NEBULIZER] Take 3 mL by nebulization 4 (four) times a day as needed.             lisinopril (PRINIVIL,ZESTRIL) 10 MG tablet [LISINOPRIL (PRINIVIL,ZESTRIL) 10 MG TABLET] Take 10 mg by mouth daily.      loratadine (CLARITIN) 10 mg tablet [LORATADINE (CLARITIN) 10 MG TABLET] Take 10 mg by mouth daily.      lubiprostone (AMITIZA) 24 MCG capsule [LUBIPROSTONE (AMITIZA) 24 MCG CAPSULE] Take 24 mcg by mouth 2 (two) times a day.      magnesium hydroxide (MILK OF MAG) 400 mg/5 mL Susp suspension [MAGNESIUM HYDROXIDE (MILK OF MAG) 400 MG/5 ML SUSP SUSPENSION] Take 30 mL by mouth 2 (two) times a day as needed.             methocarbamol (ROBAXIN) 500 MG tablet Take 1 tablet (500 mg) by mouth 3 times daily as needed for muscle spasms 12 tablet 0    mometasone (NASONEX) 50 mcg/actuation nasal spray [MOMETASONE (NASONEX) 50 MCG/ACTUATION NASAL SPRAY] 2 sprays into each nostril daily.      multivitamin with minerals (THERA-M) 9 mg iron-400 mcg Tab tablet [MULTIVITAMIN WITH MINERALS (THERA-M) 9 MG IRON-400 MCG TAB TABLET] Take 1 tablet by mouth daily.      nicotine (NICODERM CQ) 21 mg/24 hr [NICOTINE (NICODERM CQ) 21 MG/24 HR] Place 1 patch on the skin daily as needed for smoking cessation.             omeprazole (PRILOSEC) 20 MG DR capsule Take 20 mg by mouth daily      polyethylene glycol (MIRALAX) 17 gram packet [POLYETHYLENE GLYCOL (MIRALAX) 17 GRAM PACKET] Take 17 g by mouth 2 (two) times a day.             pravastatin (PRAVACHOL) 40 MG tablet [PRAVASTATIN (PRAVACHOL) 40 MG TABLET] Take 40 mg by mouth every morning.             predniSONE (DELTASONE) 20 MG tablet Take 2 tablets (40 mg) by mouth daily 8 tablet 0    ramelteon (ROZEREM) 8 mg tablet [RAMELTEON (ROZEREM) 8 MG TABLET] Take 8 mg by mouth at bedtime.      SUMAtriptan (IMITREX) 100 MG tablet [SUMATRIPTAN (IMITREX) 100 MG TABLET]  Take 100 mg by mouth every 2 (two) hours as needed for migraine.      topiramate (TOPAMAX) 200 MG tablet [TOPIRAMATE (TOPAMAX) 200 MG TABLET] Take 200 mg by mouth 2 (two) times a day.      traMADol (ULTRAM) 50 MG tablet Take 50 mg by mouth every 6 hours as needed for severe pain      triamcinolone (KENALOG) 0.1 % ointment [TRIAMCINOLONE (KENALOG) 0.1 % OINTMENT] Apply 1 application topically 2 (two) times a day.       PHYSICAL EXAM:   BP 98/54   Pulse 75   Temp 98.3  F (36.8  C) (Oral)   Resp 20   Wt 63.5 kg (140 lb)   SpO2 96%   BMI 28.28 kg/m     GEN: NAD, female appears stated age lying in bed  HEENT: No icterus, no lymphadenopathy  HRT: RRR  LUNGS: CTA  ABD: Scarring noted, +BS, soft with areas of firmness, RLQ pain with guarding  SKIN: No rash, jaundice  MSKL: no LE edema, strength 5/5 all 4 extrems  NEURO: Alert and oriented, appropriate mood and affect     ADDITIONAL DATA:   I reviewed the patient's new clinical lab test results.   Recent Labs   Lab Test 09/13/24 0715 09/12/24 2041 05/23/24  1645 09/12/23  0435 09/11/23 2230 03/04/23  2347 07/14/19  1630   WBC 12.3* 16.7* 15.5*  15.5*   < > 16.2*   < > 14.6*   HGB 14.1 15.0 14.9  14.9   < > 14.2   < > 12.7   MCV 96 93 96  96   < > 92   < > 90    249 204  204   < > 263   < > 249   INR  --  0.94  --   --  0.90  --  1.07    < > = values in this interval not displayed.     Recent Labs   Lab Test 09/13/24 0715 09/12/24 2041 05/23/24  1342   POTASSIUM 3.6 3.4 4.3   CHLORIDE 108* 102 107   CO2 20* 22 22   BUN 11.8 13.2 10.0   ANIONGAP 7 9 7     Recent Labs   Lab Test 09/13/24 0715 09/12/24 2041 12/07/23  0031 09/11/23 2230 09/11/23 2217 09/02/23  0104 09/02/23  0046 06/24/23  0101 05/02/23  0021 03/04/23 2347 03/04/23 2238   ALBUMIN 3.1* 3.8 3.9   < >  --  3.7  --  3.6 3.6   < >  --    BILITOTAL 0.3 0.2 0.2   < >  --  <0.2  --  0.2 <0.2   < >  --    ALT 58* 20 22   < >  --  22  --  17 21   < >  --    * 13 29   < >  --  27  --  22  20   < >  --    PROTEIN  --   --   --   --  Negative  --  Negative  --   --   --  Negative   LIPASE  --   --   --   --   --  14  --  12* 19   < >  --     < > = values in this interval not displayed.     Gastric neurostimulator interrogated:   Voltage 5.0    Rate 14  Cycle on 0.1s  Cycle off 5.0s  Impedance 402  Electrode impedance: abnormal    Imaging results:  CT abdomen/pelvis w/o 9/12/24:  FINDINGS:   LOWER CHEST: Normal.  HEPATOBILIARY: Prior cholecystectomy. Few small calcified granulomata.  PANCREAS: Normal.  SPLEEN: Small calcified granulomata.  ADRENAL GLANDS: Tiny benign right adrenal adenoma unchanged.  KIDNEYS/BLADDER: 2 mm nonobstructing stone lower pole right kidney, no right hydronephrosis.  Left kidney normal. Bladder negative.  BOWEL: Dilated loops of fluid-filled small bowel are present within right lower quadrant consistent with small bowel obstruction, caliber of to 3.5 cm. Etiology not clearly evident. Involving bowel loops have a somewhat C-shaped configuration raising   question of possible internal hernia though a definitive hernia diagnosis cannot be made. Proximal small bowel appears normal in caliber. Wisp of mesenteric edema within the involved region. No free fluid or free air. Colon unremarkable. Gastric   pacemaker in stable position.  LYMPH NODES: Normal.  VASCULATURE: Normal.  PELVIC ORGANS: No pelvic mass. No free fluid.  MUSCULOSKELETAL: Bilateral InterStim wires present with only left-sided power generator in place. No change in the focus of subcutaneous soft tissue stranding mid left abdomen likely a zone of fibrosis and may be a site of a previous implant..                                                                   IMPRESSION:   1.  New distal small bowel obstruction, etiology not clearly evident, internal hernia not excluded.          ASSESSMENT:    SBO  GI bleed  This is a 62 y/o female with PMH HTN, type 2 diabetes, seizure disorder, COPD, CVA, GERD, gastroparesis  s/p gastric neurostimulator placement, IBS-C, chronic pain, schizoaffective disorder, (no documented Crohn's disease through [facility]) admitted 9/12 for SBO and GI bleed after presenting with abdominal pain and hematemesis.     SBO- CT scan shows distal small bowel obstruction in the RLQ, which is the area of her pain. She does not have Crohn's disease per [facility] record review nor recent colonoscopy in 2021. Surgery following, no SBFT due to contrast allergy.   GI bleed- She reports hematemesis after multiple episodes of vomiting so most likely due to Ashley leung tear vs PUD, gastritis, erosive esophagitis. Given possible SBO and stable hemoglobin (14.1 this AM) no plan for endoscopic evaluation at this time. Recommend conservative management with IV PPI.  Gastroparesis- She has known gastroparesis s/p gastric neurostimulator since 2013 with battery/device change 2015. Neurostimulator interrogated today and impedance suggests possible issue with her leads and imaging shows inappropriate crossing of leads, which may or may not be contributing to her symptoms. Given the age of her device battery life interrogation is not reliable and it is most likely that the neurostimulator is not functioning without battery change since 2015 and lead-crossing also an issue. She will need outpatient follow up with Dr. Greg Cisneros at [facility] for likely device exchange.     PLAN:  - NPO  - Pantoprazole 40 mg IV BID  - Supportive cares, hemoglobin monitoring per medicine  - Outpatient surgery follow up for gastric neurostimulator exchange ([facility] will help facilitate this)    GI addendum: Patient seen just now reporting significant worsening of pain and distension, no recurrent vomiting. Minimal bowel sounds and significant tenderness to mild palpation. Abdominal xray ordered, d/w Hospitalist. May need to let surgery know if there is concern for surgical abdomen/perforation.     Natalie Chiang PA-C  9/13/2024 5:26  PM        [facility]  9/13/2024 9:21 AM  [phone] (office)    This case was discussed with Dr. Shukla who agrees to the above assessment and plan.      50 minutes of total time was spent today providing patient care, including patient evaluation, reviewing documentation/test result, and .   ________________________________________________________________________      GI staff addendum    The patient was seen and examined, I agree with the above assessment and plan by Natalie De La Rosa.  The patient is a 63 years old female with history of hypertension, diabetes mellitus, COPD, CVA, GERD, gastroparesis, IBS, schizoaffective disorder who comes in with black tarry stool and coffee-ground emesis also complaining of right lower quadrant pain.  CT showing dilated loops of fluid-filled small bowel in the right lower quadrant consistent with small bowel obstruction.  Surgery team has already evaluated the patient also  Physical exam:  Alert awake and oriented  No acute distress  Vital stable  Abdomen: Tender in the right abdomen, rebound tenderness?.    Assessment  Small bowel obstruction, abdominal pain.  Concerns for ongoing bowel issues with increased pain in the afternoon.  X-rays ordered.  Primary team informed.  If she does have acute abdomen then we will need reevaluation by surgery.  GI bleed after vomiting, Ashley-Franz tear could be the etiology otherwise gastritis esophagitis and peptic ulcer disease could be other etiologies.  Known gastroparesis.    Plan.  Recommend following x-rays.  Follow clinically for acute abdomen.  Follow hemoglobin.  Agree with supportive care.    Total time spent was 25 minutes.    Arnold Shukla MD

## 2024-09-13 NOTE — ED TRIAGE NOTES
Pt reports severe abd pain.  States she has had a black tarry stool and coffeee grounds in emesis.  Pt reports taking compazine without relief.  Pt brought in by [facility].   for EMS.       Triage Assessment (Adult)       Row Name 09/12/24 2007          Triage Assessment    Airway WDL WDL        Respiratory WDL    Respiratory WDL WDL        Skin Circulation/Temperature WDL    Skin Circulation/Temperature WDL WDL        Cardiac WDL    Cardiac WDL WDL        Peripheral/Neurovascular WDL    Peripheral Neurovascular WDL WDL        Cognitive/Neuro/Behavioral WDL    Cognitive/Neuro/Behavioral WDL WDL

## 2024-09-13 NOTE — H&P
[facility]    History and Physical - Hospitalist Service       Date of Admission:  9/12/2024    Assessment & Plan   Rukhsana Griffith is a 63-year-old female with a medical history significant for Crohn's disease, type 2 diabetes mellitus, hypertension, seizure disorder, nicotine use, and other medical conditions. She is being admitted for evaluation of abdominal pain, which is attributed to an acute small bowel obstruction (SBO), with a concerning history for a possible gastrointestinal bleed (GIB).       Patient Active Problem List   Diagnosis    Chest pain    Microcytic hypochromic anemia    Delayed gastric emptying    Abdominal pain, generalized    Hypoglycemia    Altered mental status, unspecified    Anxiety    Asthma    Calcified granuloma of lung (H)    Chronic hyponatremia    COPD mixed type (H)    Diabetic gastroparesis (H)    Dyslipidemia, goal LDL below 100    History of methamphetamine abuse (H)    Hypertension    Increased frequency of urination    Iron deficiency anemia    Irritable bowel syndrome    Mixed conductive and sensorineural hearing loss of right ear with restricted hearing of left ear    Migraine    Left homonymous hemianopsia    Neutrophilic leukocytosis    Occipital neuralgia of left side    Osteoporosis    Otalgia, right    Overactive bladder    Pelvic and perineal pain    Perennial allergic rhinitis    Peripheral neuropathy    Personality disorder (H)    Prolonged Q-T interval on ECG    Psychophysiological insomnia    Retrobulbar neuritis of both eyes    Right lumbar radiculopathy    Schizoaffective disorder, chronic condition (H)    Seizure disorder, grand mal (H)    Sensorineural hearing loss (SNHL) of left ear with restricted hearing of right ear    Spasm    Stress incontinence of urine    Weakness of right foot    Vitamin D deficiency    Unspecified visual disturbance    Unilateral weakness    Type II diabetes mellitus with complication (H)    Tobacco abuse     Tension headache    Atypical chest pain    COPD exacerbation (H)    Hypoxia    Episode of loss of consciousness    Chronic seizure disorder with history of head trauma (H)    Degenerative lumbar spinal stenosis    Spinal stenosis of lumbar region with neurogenic claudication    Crohn's disease with complication, unspecified gastrointestinal tract location (H)    Small bowel obstruction (H)           Abdominal Pain: Likely multifactorial, due to both gastritis and small bowel obstruction (SBO). Continue monitoring and supportive care. Pain control. Make NPO except ice chips,  general surgery consulted to assist with SBO. GI consulted for possible upper GIB.  Trend Hb. PPI BID    ?Acute Gastrointestinal Bleed (GIB): Monitor hemoglobin levels closely. Initiate proton pump inhibitor (PPI) therapy and adjust based on trends. GI consulted    History of chron's disease: The patient is at increased risk for gastritis and gastroesophageal reflux disease (GERD). On chronic steroids and selective serotonin reuptake inhibitor. Start PPI therapy twice daily. GI consult recommended for further evaluation and management    Obstructive Sleep Apnea (GABRIELLE): Ensure the patient uses CPAP at night for sleep support.    Rest of patients medical history, management remains unchaged       Diet: NPO for Medical/Clinical Reasons Except for: No Exceptions  - ice chips ok  DVT Prophylaxis: Heparin SQ  Chatterjee Catheter: Not present  Lines: None     Cardiac Monitoring: None  Code Status:  Full code,     Clinically Significant Risk Factors Present on Admission                  # Hypertension: Noted on problem list             # Asthma: noted on problem list              Disposition Plan     Medically Ready for Discharge: Anticipated in 2-4 Days           Rosemarie Pollard MD  Hospitalist Service  [facility]  Securely message with [employer] (more info)  Text page via [employer] Paging/Kismety      ______________________________________________________________________    Chief Complaint   Abd pain  Cofee ground emesis        History of Present Illness   Rukhsana Griffith is a 63-year-old female with a medical history significant for Crohn's disease, type 2 diabetes mellitus, hypertension, seizure disorder, nicotine use, and other medical conditions. She is being admitted for evaluation of abdominal pain, which is attributed to an acute small bowel obstruction (SBO), with a concerning history for a possible gastrointestinal bleed (GIB).      The patient presents with severe abdominal pain. She reports having black tarry stools (melena) and coffee-ground emesis, indicating possible gastrointestinal bleeding. The patient attempted to take Compazine without relief. She was brought in by [facility] with an initial blood glucose of 176.    The patient has a complex medical history, including gastroparesis with a gastric stimulator in place. Today, she reports worsening abdominal pain, bloating, and dark stools. A CT scan confirmed the presence of a small bowel obstruction (SBO) with no prior history of SBO. She is currently NPO, receiving IV fluids, and has been given morphine for pain management.      EXAM: CT Abdomen/Pelvis without Contrast  Location: [facility]  Date: 9/12/2024  Indication: Melena  Comparison: 6/26/2024  Findings:    Lower Chest: Normal.  Hepatobiliary: Post-cholecystectomy, with a few small calcified granulomata.  Pancreas: Normal.  Spleen: Small calcified granulomata.  Adrenal Glands: Tiny benign right adrenal adenoma, unchanged.  Kidneys/Bladder: 2 mm non-obstructing stone in the lower pole of the right kidney, no hydronephrosis. Left kidney normal. Bladder negative.  Bowel: Dilated loops of fluid-filled small bowel in the right lower quadrant, consistent with small bowel obstruction (SBO) up to 3.5 cm in diameter. Etiology is unclear, but the C-shaped  configuration raises the question of a possible internal hernia. Proximal small bowel appears normal. There is a slight amount of mesenteric edema, but no free fluid or free air. The colon is unremarkable. Gastric pacemaker is in a stable position.  Lymph Nodes: Normal.  Vasculature: Normal.  Pelvic Organs: No pelvic mass or free fluid.  Musculoskeletal: Bilateral InterStim wires with a left-sided power generator. No changes in the subcutaneous soft tissue stranding in the mid-left abdomen, likely related to previous fibrosis or a previous implant site.  Impression:    New distal small bowel obstruction, etiology unclear. Internal hernia not excluded.          Past Medical History    Past Medical History:   Diagnosis Date    Anemia     Cerebral artery occlusion with cerebral infarction (H)     COPD (chronic obstructive pulmonary disease) (H)     Diabetes (H)     Gastroesophageal reflux disease     History of blood transfusion     HLD (hyperlipidemia)     Hypertension     Methamphetamine abuse (H)     Oxygen dependent     Schizoaffective disorder (H)     Seizures (H)        Past Surgical History   Past Surgical History:   Procedure Laterality Date    HYSTERECTOMY      KNEE SURGERY      LAMINECTOMY LUMBAR ONE LEVEL Right 5/23/2024    Procedure: RIGHT LUMBAR 4 - LUMBAR 5 MEDIAL FACETECTOMY DECOMPRESSION;  Surgeon: Dylan Alba MD;  Location: [facility] Main OR    OTHER SURGICAL HISTORY      gastric pacemaker    OTHER SURGICAL HISTORY      interstim placement    PICC TRIPLE LUMEN PLACEMENT  5/23/2024    HI REVISE/REMOVE PERIPH/GASTRIC NEUROSTIM/ N/A 5/15/2019    Procedure: PARTIAL REMOVAL OLD LEAD;  Surgeon: Jcarlos Muñiz MD;  Location: [facility];  Service: Urology    RELEASE CARPAL TUNNEL         Prior to Admission Medications   Prior to Admission Medications   Prescriptions Last Dose Informant Patient Reported? Taking?   EPINEPHrine (EPIPEN) 0.3 mg/0.3 mL atIn   Yes No   Sig:  [EPINEPHRINE (EPIPEN) 0.3 MG/0.3 ML ATIN] Inject 0.3 mg into the shoulder, thigh, or buttocks as needed.   SUMAtriptan (IMITREX) 100 MG tablet   Yes No   Sig: [SUMATRIPTAN (IMITREX) 100 MG TABLET] Take 100 mg by mouth every 2 (two) hours as needed for migraine.   acetaminophen (TYLENOL) 160 mg/5 mL (5 mL) Soln solution   Yes No   Sig: [ACETAMINOPHEN (TYLENOL) 160 MG/5 ML (5 ML) SOLN SOLUTION] Take 10 mL by mouth every 4 (four) hours as needed (Pain).          albuterol (PROAIR HFA;PROVENTIL HFA;VENTOLIN HFA) 90 mcg/actuation inhaler   Yes No   Sig: [ALBUTEROL (PROAIR HFA;PROVENTIL HFA;VENTOLIN HFA) 90 MCG/ACTUATION INHALER] Inhale 2 puffs every 6 (six) hours as needed for wheezing.   albuterol (PROVENTIL) 2.5 mg /3 mL (0.083 %) nebulizer solution   Yes No   Sig: [ALBUTEROL (PROVENTIL) 2.5 MG /3 ML (0.083 %) NEBULIZER SOLUTION] Take 2.5 mg by nebulization every 4 (four) hours as needed for wheezing.          aluminum-magnesium hydroxide-simethicone (MAALOX MAX STRENGTH) 400-400-40 mg/5 mL suspension   Yes No   Sig: [ALUMINUM-MAGNESIUM HYDROXIDE-SIMETHICONE (MAALOX MAX STRENGTH) 400-400-40 MG/5 ML SUSPENSION] Take 5 mL by mouth every 4 (four) hours as needed for indigestion.          ammonium lactate (AMLACTIN) 12 % cream   Yes No   Sig: [AMMONIUM LACTATE (AMLACTIN) 12 % CREAM] Apply 1 application topically 2 (two) times a day.          atropine 1 % ophthalmic solution   Yes No   Sig: Place 1-2 drops under the tongue 2 times daily as needed for secretions   budesonide-formoterol (SYMBICORT) 160-4.5 mcg/actuation inhaler   Yes No   Sig: [BUDESONIDE-FORMOTEROL (SYMBICORT) 160-4.5 MCG/ACTUATION INHALER] Inhale 2 puffs 2 (two) times a day.          cholecalciferol, vitamin D3, 2,000 unit Tab   Yes No   Sig: [CHOLECALCIFEROL, VITAMIN D3, 2,000 UNIT TAB] Take 2,000 Units by mouth daily.   cloZAPine (CLOZARIL) 100 MG tablet   Yes No   Sig: [CLOZAPINE (CLOZARIL) 100 MG TABLET] Take 200 mg by mouth at bedtime.           diphenhydrAMINE (BENADRYL) 25 mg capsule   Yes No   Sig: [DIPHENHYDRAMINE (BENADRYL) 25 MG CAPSULE] Take 25 mg by mouth at bedtime as needed for itching.   docusate sodium (COLACE) 100 MG capsule   Yes No   Sig: [DOCUSATE SODIUM (COLACE) 100 MG CAPSULE] Take 100 mg by mouth 2 (two) times a day.          escitalopram oxalate (LEXAPRO) 10 MG tablet   Yes No   Sig: Take 15 mg by mouth daily   fluoride, sodium, (DENTAGEL) 1.1 % Gel dental gel   Yes No   Sig: [FLUORIDE, SODIUM, (DENTAGEL) 1.1 % GEL DENTAL GEL] Apply 1 application to teeth at bedtime.   fluticasone (FLONASE) 50 MCG/ACT nasal spray   Yes No   Sig: Spray 1 spray into both nostrils daily   fluticasone-salmeterol (ADVAIR) 500-50 MCG/ACT inhaler   Yes No   Sig: Inhale 1 puff into the lungs 2 times daily   gabapentin (NEURONTIN) 100 MG capsule   Yes No   Sig: Take 100 mg by mouth 2 times daily (before meals) 100mg in morning, 100mg with lunch, 300mg at HS   gabapentin (NEURONTIN) 300 MG capsule   Yes No   Sig: Take 300 mg by mouth at bedtime 100mg in morning, 100mg with lunch, 300mg at HS   glucagon, human recombinant, (GLUCAGEN HYPOKIT) 1 mg injection   Yes No   Sig: [GLUCAGON, HUMAN RECOMBINANT, (GLUCAGEN HYPOKIT) 1 MG INJECTION] Infuse 1 mg into a venous catheter once as needed (Low blood sugars).          insulin aspart (NOVOLOG FLEXPEN) 100 UNIT/ML pen   Yes No   Sig: Inject 15 Units Subcutaneous daily (before lunch)   insulin aspart (NOVOLOG PEN) 100 UNIT/ML pen   Yes No   Sig: Inject 15 Units Subcutaneous daily (with dinner)   insulin aspart U-100 (NOVOLOG) 100 unit/mL injection   Yes No   Sig: Inject 20 Units Subcutaneous daily before breakfast   insulin glargine (LANTUS) 100 unit/mL injection   Yes No   Sig: Inject 50 Units Subcutaneous at bedtime   ipratropium-albuterol (DUO-NEB) 0.5-2.5 mg/3 mL nebulizer   Yes No   Sig: [IPRATROPIUM-ALBUTEROL (DUO-NEB) 0.5-2.5 MG/3 ML NEBULIZER] Take 3 mL by nebulization 4 (four) times a day as needed.           lisinopril (PRINIVIL,ZESTRIL) 10 MG tablet   Yes No   Sig: [LISINOPRIL (PRINIVIL,ZESTRIL) 10 MG TABLET] Take 10 mg by mouth daily.   loratadine (CLARITIN) 10 mg tablet   Yes No   Sig: [LORATADINE (CLARITIN) 10 MG TABLET] Take 10 mg by mouth daily.   lubiprostone (AMITIZA) 24 MCG capsule   Yes No   Sig: [LUBIPROSTONE (AMITIZA) 24 MCG CAPSULE] Take 24 mcg by mouth 2 (two) times a day.   magnesium hydroxide (MILK OF MAG) 400 mg/5 mL Susp suspension   Yes No   Sig: [MAGNESIUM HYDROXIDE (MILK OF MAG) 400 MG/5 ML SUSP SUSPENSION] Take 30 mL by mouth 2 (two) times a day as needed.          methocarbamol (ROBAXIN) 500 MG tablet   No No   Sig: Take 1 tablet (500 mg) by mouth 3 times daily as needed for muscle spasms   mometasone (NASONEX) 50 mcg/actuation nasal spray   Yes No   Sig: [MOMETASONE (NASONEX) 50 MCG/ACTUATION NASAL SPRAY] 2 sprays into each nostril daily.   multivitamin with minerals (THERA-M) 9 mg iron-400 mcg Tab tablet   Yes No   Sig: [MULTIVITAMIN WITH MINERALS (THERA-M) 9 MG IRON-400 MCG TAB TABLET] Take 1 tablet by mouth daily.   nicotine (NICODERM CQ) 21 mg/24 hr   Yes No   Sig: [NICOTINE (NICODERM CQ) 21 MG/24 HR] Place 1 patch on the skin daily as needed for smoking cessation.          omeprazole (PRILOSEC) 20 MG DR capsule   Yes No   Sig: Take 20 mg by mouth daily   polyethylene glycol (MIRALAX) 17 gram packet   Yes No   Sig: [POLYETHYLENE GLYCOL (MIRALAX) 17 GRAM PACKET] Take 17 g by mouth 2 (two) times a day.          pravastatin (PRAVACHOL) 40 MG tablet   Yes No   Sig: [PRAVASTATIN (PRAVACHOL) 40 MG TABLET] Take 40 mg by mouth every morning.          predniSONE (DELTASONE) 20 MG tablet   No No   Sig: Take 2 tablets (40 mg) by mouth daily   ramelteon (ROZEREM) 8 mg tablet   Yes No   Sig: [RAMELTEON (ROZEREM) 8 MG TABLET] Take 8 mg by mouth at bedtime.   topiramate (TOPAMAX) 200 MG tablet   Yes No   Sig: [TOPIRAMATE (TOPAMAX) 200 MG TABLET] Take 200 mg by mouth 2 (two) times a day.   traMADol  (ULTRAM) 50 MG tablet   Yes No   Sig: Take 50 mg by mouth every 6 hours as needed for severe pain   triamcinolone (KENALOG) 0.1 % ointment   Yes No   Sig: [TRIAMCINOLONE (KENALOG) 0.1 % OINTMENT] Apply 1 application topically 2 (two) times a day.      Facility-Administered Medications: None        Review of Systems    The 10 point Review of Systems is negative other than noted in the HPI or here.     Social History   I have reviewed this patient's social history and updated it with pertinent information if needed.  Social History     Tobacco Use    Smoking status: Every Day     Current packs/day: 0.25     Types: Cigarettes    Smokeless tobacco: Never   Substance Use Topics    Alcohol use: Not Currently    Drug use: Not Currently         Family History         Allergies   Allergies   Allergen Reactions    Aspirin Unknown     Other reaction(s): irritation for stomach takes 81 at home with milk     Bee Sting Kit [Bee Venom] Anaphylaxis    Botox [Onabotulinumtoxina] Hives and Shortness Of Breath    Garlic Hives    Hydrocodone Headache, Hives and Itching    Hydromorphone Other (See Comments) and Rash     Seizures    Hydroxyzine Hives, Other (See Comments), Anaphylaxis and Shortness Of Breath     Dyspnea    Hymenoptera Allergenic Extract [Wasp Venom Protein] Anaphylaxis    Iodinated Contrast Media Hives, Other (See Comments), Itching and Swelling     EDEMA    Latex Shortness Of Breath    Lidocaine Hives     lidoderm patch only; tolerates injectable lidocaine    Onion Hives     green, red, yellow, mushrooms, garlic---all cause pt to have hives    Oxycodone Hives and Rash    Procaine Hives     tolerates injectable bupivacaine and lidocaine      Sucralose Anaphylaxis    Diazepam Rash     also: parasomnia, as pt reported waking up in bathtub    Haloperidol Rash    Ketorolac Rash    Lithium Rash    Meperidine Rash    Diatrizoate Meglumine [Diatrizoate] Hives and Swelling    Green Pepper [Capsicum] Hives    Metformin Other (See  "Comments)     \"Stomach bleeding and kidney infection\"    Adhesive Tape-Silicones [Adhesive Tape] Rash    [place] [Nuts] Rash    Cephalosporins Rash    Chlorpromazine Rash     THORAZINE    Chlorpropamide Rash    Coconut (Cocos Nucifera) Rash    Codeine Headache     Migraines       Darvocet A500 [Propoxyphene N-Apap] Rash    Dipyridamole Rash    Fentanyl Rash    Fluoxetine Rash    Furosemide Rash    Glyburide Other (See Comments) and Rash     Other reaction(s): hypoglycemia (patient is very sensitive to sulfonylureas)    Ondansetron Rash    Penicillins Rash and Other (See Comments)     Migraines, dizzy and sweating    Percocet [Oxycodone-Acetaminophen] Rash    Shellfish Containing Products [Shellfish-Derived Products] Rash    Sulfa (Sulfonamide Antibiotics) [Sulfa Antibiotics] Rash    Thallium-201 [Thallous Chloride Tl 201] Rash    Thioridazine Rash    Tizanidine Rash        Physical Exam   Vital Signs: Temp: 98.5  F (36.9  C) Temp src: Oral BP: 125/56 Pulse: 95   Resp: 18 SpO2: 93 % O2 Device: None (Room air)    Weight: 140 lbs 0 oz      General Aox3, appropriate affect, NAD, on 2L  HEENT  MMM, EOMI, PERRL  Chest Adeq E b/l, No wheezing  Heart RRR, No M/R/G  Abd- full. Disteed, diffused tendenss. N RT  BS decreased   - Deferred,   Extremity- Moving all extremities, No digital clubbing,   No edema  Neuro- Aox3, moving eileen xtremitie  gait not checked  Skin  Has no tattoo, No skin rash     Medical Decision Making       85 MINUTES SPENT BY ME on the date of service doing chart review, history, exam, documentation & further activities per the note.      ------------------ MEDICAL DECISION MAKING ------------------------------------------------------------------------------------------------------  MANAGEMENT DISCUSSED with the following over the past 24 hours: patient and team       Data   ------------------------- PAST 24 HR DATA REVIEWED -----------------------------------------------    I have personally reviewed the " following data over the past 24 hrs:    16.7 (H)  \   15.0   / 249     133 (L) 102 13.2 /  188 (H)   3.4 22 0.59 \     ALT: 20 AST: 13 AP: 147 TBILI: 0.2   ALB: 3.8 TOT PROTEIN: 6.9 LIPASE: N/A     INR:  0.94 PTT:  N/A   D-dimer:  N/A Fibrinogen:  N/A       Imaging results reviewed over the past 24 hrs:   Recent Results (from the past 24 hour(s))   CT Abdomen Pelvis w/o Contrast    Narrative    EXAM: CT ABDOMEN PELVIS W/O CONTRAST  LOCATION: [facility]  DATE: 9/12/2024    INDICATION: melena  COMPARISON: 6/26/2024  TECHNIQUE: CT scan of the abdomen and pelvis was performed without IV contrast. Multiplanar reformats were obtained. Dose reduction techniques were used.  CONTRAST: None.    FINDINGS:   LOWER CHEST: Normal.    HEPATOBILIARY: Prior cholecystectomy. Few small calcified granulomata.    PANCREAS: Normal.    SPLEEN: Small calcified granulomata.    ADRENAL GLANDS: Tiny benign right adrenal adenoma unchanged.    KIDNEYS/BLADDER: 2 mm nonobstructing stone lower pole right kidney, no right hydronephrosis.  Left kidney normal. Bladder negative.    BOWEL: Dilated loops of fluid-filled small bowel are present within right lower quadrant consistent with small bowel obstruction, caliber of to 3.5 cm. Etiology not clearly evident. Involving bowel loops have a somewhat C-shaped configuration raising   question of possible internal hernia though a definitive hernia diagnosis cannot be made. Proximal small bowel appears normal in caliber. Wisp of mesenteric edema within the involved region. No free fluid or free air. Colon unremarkable. Gastric   pacemaker in stable position.    LYMPH NODES: Normal.    VASCULATURE: Normal.    PELVIC ORGANS: No pelvic mass. No free fluid.    MUSCULOSKELETAL: Bilateral InterStim wires present with only left-sided power generator in place. No change in the focus of subcutaneous soft tissue stranding mid left abdomen likely a zone of fibrosis and may be a site of a  previous implant..      Impression    IMPRESSION:   1.  New distal small bowel obstruction, etiology not clearly evident, internal hernia not excluded..

## 2024-09-13 NOTE — PHARMACY-ADMISSION MEDICATION HISTORY
Pharmacist Admission Medication History    Admission medication history is complete. The information provided in this note is only as accurate as the sources available at the time of the update.    Information Source(s): Caregiver, Clinic records, Facility ([facility]/) medication list/MAR, and CareEverywhere/SureScripts via phone    Pertinent Information: This is the best list that was verified with patients [facility] clinic records.  Patient was unable to state when she last took her doses.      Changes made to PTA medication list:  Added: Aspirin, Ipratroprium HFA, Levalbuterol,Lidocaine patch, Magnesium citrate, Melatonin, Nicotine patch, Nitroglycerin, Ondansetron  Deleted: Albuterol Neb, Maalox, Symbicort, Vitamin D, Diphenhydramine, Docusate, Escitalopram, Dentagel, Flonase, Advair, Gabapentin, Insulin Aspart, Loratidine, Amitiza, Milk of Mag, Methocarbamol, Omeprazole, Miralax, Pravastatin,  Prednisone, Ramelteon, Sumatriptan, Triamcinolone 0.1% oint.  Changed: None    Allergies reviewed with patient and updates made in EHR: unable to assess    Medication History Completed By: Rina Tejada [facility] 9/13/2024 3:36 PM    PTA Med List   Medication Sig Last Dose    acetaminophen (TYLENOL) 32 mg/mL liquid Take 19.5 mLs by mouth every 4 hours as needed for fever or mild pain. Unknown    albuterol (PROAIR HFA/PROVENTIL HFA/VENTOLIN HFA) 108 (90 Base) MCG/ACT inhaler Inhale 1-2 puffs into the lungs every 4 hours as needed for shortness of breath, wheezing or cough. Unknown    ammonium lactate (AMLACTIN) 12 % cream [AMMONIUM LACTATE (AMLACTIN) 12 % CREAM] Apply 1 application topically 2 (two) times a day.        Unknown    aspirin 81 MG EC tablet Take 81 mg by mouth daily. Unknown    atropine 1 % ophthalmic solution Place 1 drop under the tongue at bedtime. Unknown    blood glucose (ACCU-CHEK GUIDE) test strip Use to test blood sugar 3 times daily or as directed. Unknown    cloZAPine (CLOZARIL) 100 MG tablet [CLOZAPINE  (CLOZARIL) 100 MG TABLET] Take 200 mg by mouth at bedtime.        Unknown    EPINEPHrine (EPIPEN) 0.3 mg/0.3 mL atIn [EPINEPHRINE (EPIPEN) 0.3 MG/0.3 ML ATIN] Inject 0.3 mg into the shoulder, thigh, or buttocks as needed. Unknown    insulin glargine (LANTUS) 100 unit/mL injection Inject 50 Units Subcutaneous at bedtime Unknown    ipratropium (ATROVENT HFA) 17 MCG/ACT inhaler Inhale 1 puff into the lungs 4 times daily. Unknown    ipratropium - albuterol 0.5 mg/2.5 mg/3 mL (DUONEB) 0.5-2.5 (3) MG/3ML neb solution Take 1 vial by nebulization every 6 hours as needed for shortness of breath, wheezing or cough. Unknown    levalbuterol (XOPENEX HFA) 45 MCG/ACT inhaler Inhale 1-2 puffs into the lungs every 4 hours as needed for shortness of breath or wheezing. Unknown    Lidocaine (LIDOCARE) 4 % Patch Place 1 patch onto the skin every 24 hours. To prevent lidocaine toxicity, patient should be patch free for 12 hrs daily. Unknown    lisinopril (PRINIVIL,ZESTRIL) 10 MG tablet [LISINOPRIL (PRINIVIL,ZESTRIL) 10 MG TABLET] Take 10 mg by mouth daily. Unknown    magnesium citrate solution (NOT currently available) Take 120 mLs by mouth daily as needed for constipation. Unknown    melatonin 3 MG tablet Take 3 mg by mouth nightly as needed for sleep. Unknown    mometasone (NASONEX) 50 mcg/actuation nasal spray [MOMETASONE (NASONEX) 50 MCG/ACTUATION NASAL SPRAY] 2 sprays into each nostril daily. Unknown    montelukast (SINGULAIR) 10 MG tablet Take 10 mg by mouth at bedtime. Unknown    multivitamin with minerals (THERA-M) 9 mg iron-400 mcg Tab tablet [MULTIVITAMIN WITH MINERALS (THERA-M) 9 MG IRON-400 MCG TAB TABLET] Take 1 tablet by mouth daily. Unknown    nicotine (NICODERM CQ) 14 MG/24HR 24 hr patch Place 1 patch onto the skin every 24 hours. Unknown    nicotine (NICODERM CQ) 21 mg/24 hr [NICOTINE (NICODERM CQ) 21 MG/24 HR] Place 1 patch on the skin daily as needed for smoking cessation.        Unknown    nitroGLYcerin (NITROSTAT)  0.4 MG sublingual tablet Place 0.4 mg under the tongue every 5 minutes as needed for chest pain. For chest pain place 1 tablet under the tongue every 5 minutes for 3 doses. If symptoms persist 5 minutes after 1st dose call 911. Unknown    ondansetron (ZOFRAN ODT) 4 MG ODT tab Take 4 mg by mouth every 8 hours as needed for nausea or vomiting. Unknown    thin (NO BRAND SPECIFIED) lancets Use to test blood sugar 3 times daily or as directed. Unknown    topiramate (TOPAMAX) 200 MG tablet [TOPIRAMATE (TOPAMAX) 200 MG TABLET] Take 200 mg by mouth 2 (two) times a day. Unknown    traMADol (ULTRAM) 50 MG tablet Take 50 mg by mouth every 6 hours as needed for severe pain Unknown

## 2024-09-13 NOTE — ED PROVIDER NOTES
EMERGENCY DEPARTMENT ENCOUNTER      NAME: Rukhsana Griffith  AGE: 63 year old female  YOB: 1960  MRN: [id number]  EVALUATION DATE & TIME: 9/12/2024  9:07 PM    PCP: Kei De Leon    ED PROVIDER: Aaron Moreau M.D.      Chief Complaint   Patient presents with    Abdominal Pain    Melena         FINAL IMPRESSION:  Small bowel obstruction      ED COURSE & MEDICAL DECISION MAKING:    Pertinent Labs & Imaging studies reviewed. (See chart for details)  63 year old female presents to the Emergency Department for evaluation of abdominal pain, abdominal distention, dark stools.  Symptoms started earlier today.  Reports a severe achiness and localizes mostly suprapubic area.  States she has been urinating normally.  Patient with complex medical history including diabetes mellitus, delayed gastric emptying, chronic abdominal pain.  Laboratory evaluation obtained from triage.  Revealed normal hemoglobin but slightly elevated white cell count at 16.7.  Comprehensive metabolic panel essentially normal.  INR normal.  On entering the room patient was sleeping.  On awakening her patient reporting severe abdominal pain.  She is an obese female with distended abdomen.  Gastric stimulator noted near the umbilicus.  Diffuse tenderness with decreased bowel sounds.  Will proceed with CT imaging without contrast given symptoms and presentation.  Patient appears non toxic with stable vitals signs. Overall exam is benign.  Review of records indicate patient seen on 8/26/2024 with abdominal pain and constipation.        9:17 PM I met with the patient for the initial interview and physical examination. Discussed plan for treatment and workup in the ED.    9:57 PM.  Received a call from lab.  Patient's absolute monocyte count is elevated and will require further review by pathologist.  11:09 PM.  CT imaging with evidence of small bowel obstruction.  Call placed to the admitting physician.  Patient informed of findings.   She is agreeable plan for admission.  Patient reports she tolerates intravenous morphine.  Fluids will be initiated along with morphine for discomfort.  At the conclusion of the encounter I discussed the results of all of the tests and the disposition. The questions were answered and return precautions provided. The patient or family acknowledged understanding and was agreeable with the care plan.   11:30 PM I spoke with Dr. Pollard, hospitalist.  Patient be full admit.    Medical Decision Making  Obtained supplemental history:Supplemental history obtained?: No  Reviewed external records: External records reviewed?: Documented in chart  Care impacted by chronic illness:Diabetes and Hypertension  Care significantly affected by social determinants of health:N/A  Did you consider but not order tests?: Work up considered but not performed and documented in chart, if applicable  Did you interpret images independently?: Independent interpretation of ECG and images noted in documentation, when applicable.  Consultation discussion with other provider:Did you involve another provider (consultant, MH, pharmacy, etc.)?: I discussed the care with another health care provider, see documentation for details.  Admit.  Not Applicable      PPE: Provider wore gloves    MEDICATIONS GIVEN IN THE EMERGENCY:  Medications   pantoprazole (PROTONIX) IV push injection 40 mg (40 mg Intravenous $Given 9/12/24 2203)   traMADol (ULTRAM) tablet 50 mg (50 mg Oral $Given 9/12/24 2202)       NEW PRESCRIPTIONS STARTED AT TODAY'S ER VISIT  New Prescriptions    No medications on file          =================================================================    HPI    Patient information was obtained from: the patient     Use of Intrepreter: N/A         Rukhsana Griffith is a 63 year old female with a pertient medical history of Crohn's disease, hysterectomy, type 2 diabetes, hypertension, chronic seizure disorder, and tobacco abuse who presents to the ED  for evaluation of abdominal pain and melena.    The patient reports she called her PCP about the abdominal pain and was advised to present to the ED. She reports abdominal swelling with pain since 10 AM today. She endorses black stool that is diarrhea-like associated with the abdominal pain. States the black stool is new. No Pepto bismol intake. Denies any new or unusual food intake.     She has a history of gastric emptying issues.     The patient denies dysuria and any other complaints at this time.       REVIEW OF SYSTEMS   Constitutional:  Denies fever, chills  Respiratory:  Denies productive cough or increased work of breathing  Cardiovascular:  Denies chest pain, palpitations  GI:  Denies nausea, vomiting, or change in bladder habits. Reports abdominal pain with swelling and melena  Musculoskeletal:  Denies any new muscle/joint swelling  Skin:  Denies rash   Neurologic:  Denies focal weakness  All systems negative except as marked.     PAST MEDICAL HISTORY:  Past Medical History:   Diagnosis Date    Anemia     Cerebral artery occlusion with cerebral infarction (H)     COPD (chronic obstructive pulmonary disease) (H)     Diabetes (H)     Gastroesophageal reflux disease     History of blood transfusion     HLD (hyperlipidemia)     Hypertension     Methamphetamine abuse (H)     Oxygen dependent     Schizoaffective disorder (H)     Seizures (H)        PAST SURGICAL HISTORY:  Past Surgical History:   Procedure Laterality Date    HYSTERECTOMY      KNEE SURGERY      LAMINECTOMY LUMBAR ONE LEVEL Right 5/23/2024    Procedure: RIGHT LUMBAR 4 - LUMBAR 5 MEDIAL FACETECTOMY DECOMPRESSION;  Surgeon: Dylan Alba MD;  Location: [facility] Main OR    OTHER SURGICAL HISTORY      gastric pacemaker    OTHER SURGICAL HISTORY      interstim placement    PICC TRIPLE LUMEN PLACEMENT  5/23/2024    ID REVISE/REMOVE PERIPH/GASTRIC NEUROSTIM/ N/A 5/15/2019    Procedure: PARTIAL REMOVAL OLD LEAD;  Surgeon:  Jcarlos Muñiz MD;  Location: [facility];  Service: Urology    RELEASE CARPAL TUNNEL           CURRENT MEDICATIONS:    No current facility-administered medications for this encounter.    Current Outpatient Medications:     acetaminophen (TYLENOL) 160 mg/5 mL (5 mL) Soln solution, [ACETAMINOPHEN (TYLENOL) 160 MG/5 ML (5 ML) SOLN SOLUTION] Take 10 mL by mouth every 4 (four) hours as needed (Pain).       , Disp: , Rfl:     albuterol (PROAIR HFA;PROVENTIL HFA;VENTOLIN HFA) 90 mcg/actuation inhaler, [ALBUTEROL (PROAIR HFA;PROVENTIL HFA;VENTOLIN HFA) 90 MCG/ACTUATION INHALER] Inhale 2 puffs every 6 (six) hours as needed for wheezing., Disp: , Rfl:     albuterol (PROVENTIL) 2.5 mg /3 mL (0.083 %) nebulizer solution, [ALBUTEROL (PROVENTIL) 2.5 MG /3 ML (0.083 %) NEBULIZER SOLUTION] Take 2.5 mg by nebulization every 4 (four) hours as needed for wheezing.       , Disp: , Rfl:     aluminum-magnesium hydroxide-simethicone (MAALOX MAX STRENGTH) 400-400-40 mg/5 mL suspension, [ALUMINUM-MAGNESIUM HYDROXIDE-SIMETHICONE (MAALOX MAX STRENGTH) 400-400-40 MG/5 ML SUSPENSION] Take 5 mL by mouth every 4 (four) hours as needed for indigestion.       , Disp: , Rfl:     ammonium lactate (AMLACTIN) 12 % cream, [AMMONIUM LACTATE (AMLACTIN) 12 % CREAM] Apply 1 application topically 2 (two) times a day.       , Disp: , Rfl:     atropine 1 % ophthalmic solution, Place 1-2 drops under the tongue 2 times daily as needed for secretions, Disp: , Rfl:     budesonide-formoterol (SYMBICORT) 160-4.5 mcg/actuation inhaler, [BUDESONIDE-FORMOTEROL (SYMBICORT) 160-4.5 MCG/ACTUATION INHALER] Inhale 2 puffs 2 (two) times a day.       , Disp: , Rfl:     cholecalciferol, vitamin D3, 2,000 unit Tab, [CHOLECALCIFEROL, VITAMIN D3, 2,000 UNIT TAB] Take 2,000 Units by mouth daily., Disp: , Rfl:     cloZAPine (CLOZARIL) 100 MG tablet, [CLOZAPINE (CLOZARIL) 100 MG TABLET] Take 200 mg by mouth at bedtime.       , Disp: , Rfl:     diphenhydrAMINE (BENADRYL) 25  mg capsule, [DIPHENHYDRAMINE (BENADRYL) 25 MG CAPSULE] Take 25 mg by mouth at bedtime as needed for itching., Disp: , Rfl:     docusate sodium (COLACE) 100 MG capsule, [DOCUSATE SODIUM (COLACE) 100 MG CAPSULE] Take 100 mg by mouth 2 (two) times a day.       , Disp: , Rfl:     EPINEPHrine (EPIPEN) 0.3 mg/0.3 mL atIn, [EPINEPHRINE (EPIPEN) 0.3 MG/0.3 ML ATIN] Inject 0.3 mg into the shoulder, thigh, or buttocks as needed., Disp: , Rfl:     escitalopram oxalate (LEXAPRO) 10 MG tablet, Take 15 mg by mouth daily, Disp: , Rfl:     fluoride, sodium, (DENTAGEL) 1.1 % Gel dental gel, [FLUORIDE, SODIUM, (DENTAGEL) 1.1 % GEL DENTAL GEL] Apply 1 application to teeth at bedtime., Disp: , Rfl:     fluticasone (FLONASE) 50 MCG/ACT nasal spray, Spray 1 spray into both nostrils daily, Disp: , Rfl:     fluticasone-salmeterol (ADVAIR) 500-50 MCG/ACT inhaler, Inhale 1 puff into the lungs 2 times daily, Disp: , Rfl:     gabapentin (NEURONTIN) 100 MG capsule, Take 100 mg by mouth 2 times daily (before meals) 100mg in morning, 100mg with lunch, 300mg at HS, Disp: , Rfl:     gabapentin (NEURONTIN) 300 MG capsule, Take 300 mg by mouth at bedtime 100mg in morning, 100mg with lunch, 300mg at HS, Disp: , Rfl:     glucagon, human recombinant, (GLUCAGEN HYPOKIT) 1 mg injection, [GLUCAGON, HUMAN RECOMBINANT, (GLUCAGEN HYPOKIT) 1 MG INJECTION] Infuse 1 mg into a venous catheter once as needed (Low blood sugars).       , Disp: , Rfl:     insulin aspart (NOVOLOG FLEXPEN) 100 UNIT/ML pen, Inject 15 Units Subcutaneous daily (before lunch), Disp: , Rfl:     insulin aspart (NOVOLOG PEN) 100 UNIT/ML pen, Inject 15 Units Subcutaneous daily (with dinner), Disp: , Rfl:     insulin aspart U-100 (NOVOLOG) 100 unit/mL injection, Inject 20 Units Subcutaneous daily before breakfast, Disp: , Rfl:     insulin glargine (LANTUS) 100 unit/mL injection, Inject 50 Units Subcutaneous at bedtime, Disp: , Rfl:     ipratropium-albuterol (DUO-NEB) 0.5-2.5 mg/3 mL nebulizer,  [IPRATROPIUM-ALBUTEROL (DUO-NEB) 0.5-2.5 MG/3 ML NEBULIZER] Take 3 mL by nebulization 4 (four) times a day as needed.       , Disp: , Rfl:     lisinopril (PRINIVIL,ZESTRIL) 10 MG tablet, [LISINOPRIL (PRINIVIL,ZESTRIL) 10 MG TABLET] Take 10 mg by mouth daily., Disp: , Rfl:     loratadine (CLARITIN) 10 mg tablet, [LORATADINE (CLARITIN) 10 MG TABLET] Take 10 mg by mouth daily., Disp: , Rfl:     lubiprostone (AMITIZA) 24 MCG capsule, [LUBIPROSTONE (AMITIZA) 24 MCG CAPSULE] Take 24 mcg by mouth 2 (two) times a day., Disp: , Rfl:     magnesium hydroxide (MILK OF MAG) 400 mg/5 mL Susp suspension, [MAGNESIUM HYDROXIDE (MILK OF MAG) 400 MG/5 ML SUSP SUSPENSION] Take 30 mL by mouth 2 (two) times a day as needed.       , Disp: , Rfl:     methocarbamol (ROBAXIN) 500 MG tablet, Take 1 tablet (500 mg) by mouth 3 times daily as needed for muscle spasms, Disp: 12 tablet, Rfl: 0    mometasone (NASONEX) 50 mcg/actuation nasal spray, [MOMETASONE (NASONEX) 50 MCG/ACTUATION NASAL SPRAY] 2 sprays into each nostril daily., Disp: , Rfl:     multivitamin with minerals (THERA-M) 9 mg iron-400 mcg Tab tablet, [MULTIVITAMIN WITH MINERALS (THERA-M) 9 MG IRON-400 MCG TAB TABLET] Take 1 tablet by mouth daily., Disp: , Rfl:     nicotine (NICODERM CQ) 21 mg/24 hr, [NICOTINE (NICODERM CQ) 21 MG/24 HR] Place 1 patch on the skin daily as needed for smoking cessation.       , Disp: , Rfl:     omeprazole (PRILOSEC) 20 MG DR capsule, Take 20 mg by mouth daily, Disp: , Rfl:     polyethylene glycol (MIRALAX) 17 gram packet, [POLYETHYLENE GLYCOL (MIRALAX) 17 GRAM PACKET] Take 17 g by mouth 2 (two) times a day.       , Disp: , Rfl:     pravastatin (PRAVACHOL) 40 MG tablet, [PRAVASTATIN (PRAVACHOL) 40 MG TABLET] Take 40 mg by mouth every morning.       , Disp: , Rfl:     predniSONE (DELTASONE) 20 MG tablet, Take 2 tablets (40 mg) by mouth daily, Disp: 8 tablet, Rfl: 0    ramelteon (ROZEREM) 8 mg tablet, [RAMELTEON (ROZEREM) 8 MG TABLET] Take 8 mg by mouth at  "bedtime., Disp: , Rfl:     SUMAtriptan (IMITREX) 100 MG tablet, [SUMATRIPTAN (IMITREX) 100 MG TABLET] Take 100 mg by mouth every 2 (two) hours as needed for migraine., Disp: , Rfl:     topiramate (TOPAMAX) 200 MG tablet, [TOPIRAMATE (TOPAMAX) 200 MG TABLET] Take 200 mg by mouth 2 (two) times a day., Disp: , Rfl:     traMADol (ULTRAM) 50 MG tablet, Take 50 mg by mouth every 6 hours as needed for severe pain, Disp: , Rfl:     triamcinolone (KENALOG) 0.1 % ointment, [TRIAMCINOLONE (KENALOG) 0.1 % OINTMENT] Apply 1 application topically 2 (two) times a day., Disp: , Rfl:     ALLERGIES:  Allergies   Allergen Reactions    Aspirin Unknown     Other reaction(s): irritation for stomach takes 81 at home with milk     Bee Sting Kit [Bee Venom] Anaphylaxis    Botox [Onabotulinumtoxina] Hives and Shortness Of Breath    Garlic Hives    Hydrocodone Headache, Hives and Itching    Hydromorphone Other (See Comments) and Rash     Seizures    Hydroxyzine Hives, Other (See Comments), Anaphylaxis and Shortness Of Breath     Dyspnea    Hymenoptera Allergenic Extract [Wasp Venom Protein] Anaphylaxis    Iodinated Contrast Media Hives, Other (See Comments), Itching and Swelling     EDEMA    Latex Shortness Of Breath    Lidocaine Hives     lidoderm patch only; tolerates injectable lidocaine    Onion Hives     green, red, yellow, mushrooms, garlic---all cause pt to have hives    Oxycodone Hives and Rash    Procaine Hives     tolerates injectable bupivacaine and lidocaine      Sucralose Anaphylaxis    Diazepam Rash     also: parasomnia, as pt reported waking up in bathtub    Haloperidol Rash    Ketorolac Rash    Lithium Rash    Meperidine Rash    Diatrizoate Meglumine [Diatrizoate] Hives and Swelling    Green Pepper [Capsicum] Hives    Metformin Other (See Comments)     \"Stomach bleeding and kidney infection\"    Adhesive Tape-Silicones [Adhesive Tape] Rash    [place] [Nuts] Rash    Cephalosporins Rash    Chlorpromazine Rash     THORAZINE    " Chlorpropamide Rash    Coconut (Cocos Nucifera) Rash    Codeine Headache     Migraines       Darvocet A500 [Propoxyphene N-Apap] Rash    Dipyridamole Rash    Fentanyl Rash    Fluoxetine Rash    Furosemide Rash    Glyburide Other (See Comments) and Rash     Other reaction(s): hypoglycemia (patient is very sensitive to sulfonylureas)    Ondansetron Rash    Penicillins Rash and Other (See Comments)     Migraines, dizzy and sweating    Percocet [Oxycodone-Acetaminophen] Rash    Shellfish Containing Products [Shellfish-Derived Products] Rash    Sulfa (Sulfonamide Antibiotics) [Sulfa Antibiotics] Rash    Thallium-201 [Thallous Chloride Tl 201] Rash    Thioridazine Rash    Tizanidine Rash       FAMILY HISTORY:  Family History   Adopted: Yes       SOCIAL HISTORY:   Social History     Socioeconomic History    Marital status:    Tobacco Use    Smoking status: Every Day     Current packs/day: 0.25     Types: Cigarettes    Smokeless tobacco: Never   Substance and Sexual Activity    Alcohol use: Not Currently    Drug use: Not Currently     Social Determinants of Health     Financial Resource Strain: Low Risk  (6/4/2024)    Received from [facility]    Financial Resource Strain     Difficulty of Paying Living Expenses: 3   Food Insecurity: No Food Insecurity (6/4/2024)    Received from [facility]    Food Insecurity     Worried About Running Out of Food in the Last Year: 1   Transportation Needs: No Transportation Needs (6/4/2024)    Received from [facility]    Transportation Needs     Lack of Transportation (Medical): 1   Social Connections: Socially Integrated (6/4/2024)    Received from [facility]    Social Connections     Frequency of Communication with Friends and Family: 0   Housing Stability: Low Risk  (6/4/2024)    Received from [facility]    Housing  Stability     Unable to Pay for Housing in the Last Year: 1       VITALS:  Patient Vitals for the past 24 hrs:   BP Temp Temp src Pulse Resp SpO2 Weight   09/12/24 2006 125/56 98.5  F (36.9  C) Oral 95 18 93 % 63.5 kg (140 lb)        PHYSICAL EXAM    Constitutional:  Mild distress. Sleeping upon entry but easily arousal.  HENT:  Normocephalic, Atraumatic. Bilateral external ears normal. Oropharynx moist. Nose normal. Neck- Normal range of motion with no guarding, No midline cervical tenderness, Supple, No stridor.   Eyes:  PERRL, EOMI with no signs of entrapment, Conjunctiva normal, No discharge.   Respiratory:  Normal breath sounds, No respiratory distress, No wheezing.    Cardiovascular:  Normal heart rate, Normal rhythm, No appreciable rubs or gallops.   GI:   Abdomen slightly distended, diffuse tenderness.  Subcutaneous mechanical device  right of umbilicus.   Musculoskeletal:  Intact distal pulses, No edema. Good range of motion in all major joints. No tenderness to palpation or major deformities noted.  Integument:  Warm, Dry, No erythema, No rash.   Neurologic:  Alert & oriented, Normal motor function, Normal sensory function, No focal deficits noted.   Psychiatric:  Affect normal, Judgment normal, Mood normal.     LAB:  All pertinent labs reviewed and interpreted.  Results for orders placed or performed during the hospital encounter of 09/12/24   Result Value Ref Range    INR 0.94 0.85 - 1.15   Comprehensive metabolic panel   Result Value Ref Range    Sodium 133 (L) 135 - 145 mmol/L    Potassium 3.4 3.4 - 5.3 mmol/L    Carbon Dioxide (CO2) 22 22 - 29 mmol/L    Anion Gap 9 7 - 15 mmol/L    Urea Nitrogen 13.2 8.0 - 23.0 mg/dL    Creatinine 0.59 0.51 - 0.95 mg/dL    GFR Estimate >90 >60 mL/min/1.73m2    Calcium 10.0 8.8 - 10.4 mg/dL    Chloride 102 98 - 107 mmol/L    Glucose 188 (H) 70 - 99 mg/dL    Alkaline Phosphatase 147 40 - 150 U/L    AST 13 0 - 45 U/L    ALT 20 0 - 50 U/L    Protein Total 6.9 6.4 - 8.3  g/dL    Albumin 3.8 3.5 - 5.2 g/dL    Bilirubin Total 0.2 <=1.2 mg/dL   CBC with platelets and differential   Result Value Ref Range    WBC Count 16.7 (H) 4.0 - 11.0 10e3/uL    RBC Count 4.73 3.80 - 5.20 10e6/uL    Hemoglobin 15.0 11.7 - 15.7 g/dL    Hematocrit 44.2 35.0 - 47.0 %    MCV 93 78 - 100 fL    MCH 31.7 26.5 - 33.0 pg    MCHC 33.9 31.5 - 36.5 g/dL    RDW 13.7 10.0 - 15.0 %    Platelet Count 249 150 - 450 10e3/uL    NRBCs per 100 WBC 0 <1 /100    Absolute NRBCs 0.0 10e3/uL   Extra Red Top Tube   Result Value Ref Range    Hold Specimen JIC    Extra Green Top (Lithium Heparin) Tube   Result Value Ref Range    Hold Specimen JIC    Adult Type and Screen   Result Value Ref Range    ABO/RH(D) O POS     Antibody Screen Negative Negative    SPECIMEN EXPIRATION DATE [id number]        RADIOLOGY:  Reviewed all pertinent imaging. Please see official radiology report.  CT Abdomen Pelvis w/o Contrast    Result Date: 9/12/2024  EXAM: CT ABDOMEN PELVIS W/O CONTRAST LOCATION: [facility] DATE: 9/12/2024 INDICATION: melena COMPARISON: 6/26/2024 TECHNIQUE: CT scan of the abdomen and pelvis was performed without IV contrast. Multiplanar reformats were obtained. Dose reduction techniques were used. CONTRAST: None. FINDINGS: LOWER CHEST: Normal. HEPATOBILIARY: Prior cholecystectomy. Few small calcified granulomata. PANCREAS: Normal. SPLEEN: Small calcified granulomata. ADRENAL GLANDS: Tiny benign right adrenal adenoma unchanged. KIDNEYS/BLADDER: 2 mm nonobstructing stone lower pole right kidney, no right hydronephrosis. Left kidney normal. Bladder negative. BOWEL: Dilated loops of fluid-filled small bowel are present within right lower quadrant consistent with small bowel obstruction, caliber of to 3.5 cm. Etiology not clearly evident. Involving bowel loops have a somewhat C-shaped configuration raising question of possible internal hernia though a definitive hernia diagnosis cannot be made. Proximal  small bowel appears normal in caliber. Wisp of mesenteric edema within the involved region. No free fluid or free air. Colon unremarkable. Gastric pacemaker in stable position. LYMPH NODES: Normal. VASCULATURE: Normal. PELVIC ORGANS: No pelvic mass. No free fluid. MUSCULOSKELETAL: Bilateral InterStim wires present with only left-sided power generator in place. No change in the focus of subcutaneous soft tissue stranding mid left abdomen likely a zone of fibrosis and may be a site of a previous implant..     IMPRESSION: 1.  New distal small bowel obstruction, etiology not clearly evident, internal hernia not excluded..         I, Wan Carranza, am serving as a scribe to document services personally performed by Aaron Moreau MD, based on my observation and the provider's statements to me. I, Aaron Moreau MD attest that Wan Carranza is acting in a scribe capacity, has observed my performance of the services and has documented them in accordance with my direction.    Aaron Moreau M.D.  Emergency Medicine  [facility] EMERGENCY DEPARTMENT     Aaron Moreau MD  09/12/24 5241       Aaron Moreau MD  09/12/24 9912

## 2024-09-13 NOTE — PLAN OF CARE
"Goal Outcome Evaluation:      Plan of Care Reviewed With: patient          Outcome Evaluation: pt has had minimal nausea, no diarrhea but experiencing a great deal pain at times 10/10 and declares \"nothing works except morphine\" up with stand by assist & unsteady at times with pain management regime. blood pressures soft this am but systolic > 100 rest of day.      "

## 2024-09-13 NOTE — PLAN OF CARE
[id number]:   Problem: Adult Inpatient Plan of Care  Goal: Optimal Comfort and Wellbeing  Outcome: Progressing     Problem: Intestinal Obstruction  Goal: Optimal Pain Control and Function  Outcome: Progressing     Problem: Comorbidity Management  Goal: Blood Pressure in Desired Range  Intervention: Maintain Blood Pressure Management  Recent Flowsheet Documentation  Taken 9/13/2024 0251 by Grazyna Rausch RN  Medication Review/Management: medications reviewed   Goal Outcome Evaluation:       Pt A&Ox4, able to make needs known appropriately. Increased abd pain overnight, PRN IV morphine given x1. Soft BP overnight, NS running at 150 mL/hr as ordered. Additional 500 mL LR bolus ordered per Dr. Pollard; administered.  NSR with first degree AV block on tele. 2 lpm O2 via NC in use. Per pt, wears 4 lpm overnight at home.

## 2024-09-13 NOTE — CONSULTS
HPI  63 year old year old female who I have been consulted by No ref. provider found for evaluation of Abdominal Pain and Melena  63-year-old female with a history of Crohn's disease, COPD, CVA who presents with 1 day history of right-sided abdominal pain.  She states the pain started on the left abdominal region after having a bulge/cramping sensation which then migrated to the right side.  Did have some nausea and emesis which she states was dark in color as well as dark-colored stool.  She has a history of gastroparesis with a gastric stimulator and states this has not been an issue for her in the past.  Currently she is lying in bed feels relatively uncomfortable on the right lower region of her abdomen.      Allergies:Aspirin, Bee sting kit [bee venom], Botox [onabotulinumtoxina], Garlic, Hydrocodone, Hydromorphone, Hydroxyzine, Hymenoptera allergenic extract [wasp venom protein], Iodinated contrast media, Latex, Lidocaine, Onion, Oxycodone, Procaine, Sucralose, Diazepam, Haloperidol, Ketorolac, Lithium, Meperidine, Diatrizoate meglumine [diatrizoate], Green pepper [capsicum], Metformin, Adhesive tape-silicones [adhesive tape], [place] [nuts], Cephalosporins, Chlorpromazine, Chlorpropamide, Coconut (cocos nucifera), Codeine, Darvocet a500 [propoxyphene n-apap], Dipyridamole, Fentanyl, Fluoxetine, Furosemide, Glyburide, Ondansetron, Penicillins, Percocet [oxycodone-acetaminophen], Shellfish containing products [shellfish-derived products], Sulfa (sulfonamide antibiotics) [sulfa antibiotics], Thallium-201 [thallous chloride tl 201], Thioridazine, and Tizanidine    Past Medical History:   Diagnosis Date    Anemia     Cerebral artery occlusion with cerebral infarction (H)     COPD (chronic obstructive pulmonary disease) (H)     Diabetes (H)     Gastroesophageal reflux disease     History of blood transfusion     HLD (hyperlipidemia)     Hypertension     Methamphetamine abuse (H)     Oxygen dependent      Schizoaffective disorder (H)     Seizures (H)        Past Surgical History:   Procedure Laterality Date    HYSTERECTOMY      KNEE SURGERY      LAMINECTOMY LUMBAR ONE LEVEL Right 5/23/2024    Procedure: RIGHT LUMBAR 4 - LUMBAR 5 MEDIAL FACETECTOMY DECOMPRESSION;  Surgeon: Dylan Alba MD;  Location: [facility] OR    OTHER SURGICAL HISTORY      gastric pacemaker    OTHER SURGICAL HISTORY      interstim placement    PICC TRIPLE LUMEN PLACEMENT  5/23/2024    CA REVISE/REMOVE PERIPH/GASTRIC NEUROSTIM/ N/A 5/15/2019    Procedure: PARTIAL REMOVAL OLD LEAD;  Surgeon: Jcarlos Muñiz MD;  Location: [facility];  Service: Urology    RELEASE CARPAL TUNNEL           CURRENT MEDS:    Current Facility-Administered Medications:     calcium carbonate (TUMS) chewable tablet 1,000 mg, 1,000 mg, Oral, 4x Daily PRN, Rosemarie Pollard MD    lactated ringers BOLUS 500 mL, 500 mL, Intravenous, Once, Rosemarie Pollard MD, Last Rate: 500 mL/hr at 09/13/24 0810, 500 mL at 09/13/24 0810    lidocaine (LMX4) cream, , Topical, Q1H PRN, Rosemarie Pollard MD    lidocaine 1 % 0.1-1 mL, 0.1-1 mL, Other, Q1H PRN, Rosemarie Pollard MD    morphine (PF) injection 2 mg, 2 mg, Intravenous, Q4H PRN, Rosemarie Pollard MD, 2 mg at 09/13/24 0504    morphine (PF) injection 4 mg, 4 mg, Intravenous, Q2H PRN, Aaron Moreau MD, 4 mg at 09/13/24 0056    pantoprazole (PROTONIX) IV push injection 40 mg, 40 mg, Intravenous, BID, Rosemarie Pollard MD, 40 mg at 09/13/24 0809    senna-docusate (SENOKOT-S/PERICOLACE) 8.6-50 MG per tablet 1 tablet, 1 tablet, Oral, BID PRN **OR** senna-docusate (SENOKOT-S/PERICOLACE) 8.6-50 MG per tablet 2 tablet, 2 tablet, Oral, BID PRN, Rosemarie Pollard MD    sodium chloride (PF) 0.9% PF flush 3 mL, 3 mL, Intracatheter, Q8H, Rosemarie Pollard MD, 3 mL at 09/13/24 0057    sodium chloride (PF) 0.9% PF flush 3 mL, 3 mL, Intracatheter, q1 min prn, Rosemarie Pollard MD    sodium chloride 0.9 % infusion, , Intravenous,  Continuous, Aaron Moreau MD, Paused at 09/13/24 0810    Current Outpatient Medications:     acetaminophen (TYLENOL) 160 mg/5 mL (5 mL) Soln solution, [ACETAMINOPHEN (TYLENOL) 160 MG/5 ML (5 ML) SOLN SOLUTION] Take 10 mL by mouth every 4 (four) hours as needed (Pain).       , Disp: , Rfl:     albuterol (PROAIR HFA;PROVENTIL HFA;VENTOLIN HFA) 90 mcg/actuation inhaler, [ALBUTEROL (PROAIR HFA;PROVENTIL HFA;VENTOLIN HFA) 90 MCG/ACTUATION INHALER] Inhale 2 puffs every 6 (six) hours as needed for wheezing., Disp: , Rfl:     albuterol (PROVENTIL) 2.5 mg /3 mL (0.083 %) nebulizer solution, [ALBUTEROL (PROVENTIL) 2.5 MG /3 ML (0.083 %) NEBULIZER SOLUTION] Take 2.5 mg by nebulization every 4 (four) hours as needed for wheezing.       , Disp: , Rfl:     aluminum-magnesium hydroxide-simethicone (MAALOX MAX STRENGTH) 400-400-40 mg/5 mL suspension, [ALUMINUM-MAGNESIUM HYDROXIDE-SIMETHICONE (MAALOX MAX STRENGTH) 400-400-40 MG/5 ML SUSPENSION] Take 5 mL by mouth every 4 (four) hours as needed for indigestion.       , Disp: , Rfl:     ammonium lactate (AMLACTIN) 12 % cream, [AMMONIUM LACTATE (AMLACTIN) 12 % CREAM] Apply 1 application topically 2 (two) times a day.       , Disp: , Rfl:     atropine 1 % ophthalmic solution, Place 1-2 drops under the tongue 2 times daily as needed for secretions, Disp: , Rfl:     budesonide-formoterol (SYMBICORT) 160-4.5 mcg/actuation inhaler, [BUDESONIDE-FORMOTEROL (SYMBICORT) 160-4.5 MCG/ACTUATION INHALER] Inhale 2 puffs 2 (two) times a day.       , Disp: , Rfl:     cholecalciferol, vitamin D3, 2,000 unit Tab, [CHOLECALCIFEROL, VITAMIN D3, 2,000 UNIT TAB] Take 2,000 Units by mouth daily., Disp: , Rfl:     cloZAPine (CLOZARIL) 100 MG tablet, [CLOZAPINE (CLOZARIL) 100 MG TABLET] Take 200 mg by mouth at bedtime.       , Disp: , Rfl:     diphenhydrAMINE (BENADRYL) 25 mg capsule, [DIPHENHYDRAMINE (BENADRYL) 25 MG CAPSULE] Take 25 mg by mouth at bedtime as needed for itching., Disp: , Rfl:     docusate  sodium (COLACE) 100 MG capsule, [DOCUSATE SODIUM (COLACE) 100 MG CAPSULE] Take 100 mg by mouth 2 (two) times a day.       , Disp: , Rfl:     EPINEPHrine (EPIPEN) 0.3 mg/0.3 mL atIn, [EPINEPHRINE (EPIPEN) 0.3 MG/0.3 ML ATIN] Inject 0.3 mg into the shoulder, thigh, or buttocks as needed., Disp: , Rfl:     escitalopram oxalate (LEXAPRO) 10 MG tablet, Take 15 mg by mouth daily, Disp: , Rfl:     fluoride, sodium, (DENTAGEL) 1.1 % Gel dental gel, [FLUORIDE, SODIUM, (DENTAGEL) 1.1 % GEL DENTAL GEL] Apply 1 application to teeth at bedtime., Disp: , Rfl:     fluticasone (FLONASE) 50 MCG/ACT nasal spray, Spray 1 spray into both nostrils daily, Disp: , Rfl:     fluticasone-salmeterol (ADVAIR) 500-50 MCG/ACT inhaler, Inhale 1 puff into the lungs 2 times daily, Disp: , Rfl:     gabapentin (NEURONTIN) 100 MG capsule, Take 100 mg by mouth 2 times daily (before meals) 100mg in morning, 100mg with lunch, 300mg at HS, Disp: , Rfl:     gabapentin (NEURONTIN) 300 MG capsule, Take 300 mg by mouth at bedtime 100mg in morning, 100mg with lunch, 300mg at HS, Disp: , Rfl:     glucagon, human recombinant, (GLUCAGEN HYPOKIT) 1 mg injection, [GLUCAGON, HUMAN RECOMBINANT, (GLUCAGEN HYPOKIT) 1 MG INJECTION] Infuse 1 mg into a venous catheter once as needed (Low blood sugars).       , Disp: , Rfl:     insulin aspart (NOVOLOG FLEXPEN) 100 UNIT/ML pen, Inject 15 Units Subcutaneous daily (before lunch), Disp: , Rfl:     insulin aspart (NOVOLOG PEN) 100 UNIT/ML pen, Inject 15 Units Subcutaneous daily (with dinner), Disp: , Rfl:     insulin aspart U-100 (NOVOLOG) 100 unit/mL injection, Inject 20 Units Subcutaneous daily before breakfast, Disp: , Rfl:     insulin glargine (LANTUS) 100 unit/mL injection, Inject 50 Units Subcutaneous at bedtime, Disp: , Rfl:     ipratropium-albuterol (DUO-NEB) 0.5-2.5 mg/3 mL nebulizer, [IPRATROPIUM-ALBUTEROL (DUO-NEB) 0.5-2.5 MG/3 ML NEBULIZER] Take 3 mL by nebulization 4 (four) times a day as needed.       , Disp: ,  Rfl:     lisinopril (PRINIVIL,ZESTRIL) 10 MG tablet, [LISINOPRIL (PRINIVIL,ZESTRIL) 10 MG TABLET] Take 10 mg by mouth daily., Disp: , Rfl:     loratadine (CLARITIN) 10 mg tablet, [LORATADINE (CLARITIN) 10 MG TABLET] Take 10 mg by mouth daily., Disp: , Rfl:     lubiprostone (AMITIZA) 24 MCG capsule, [LUBIPROSTONE (AMITIZA) 24 MCG CAPSULE] Take 24 mcg by mouth 2 (two) times a day., Disp: , Rfl:     magnesium hydroxide (MILK OF MAG) 400 mg/5 mL Susp suspension, [MAGNESIUM HYDROXIDE (MILK OF MAG) 400 MG/5 ML SUSP SUSPENSION] Take 30 mL by mouth 2 (two) times a day as needed.       , Disp: , Rfl:     methocarbamol (ROBAXIN) 500 MG tablet, Take 1 tablet (500 mg) by mouth 3 times daily as needed for muscle spasms, Disp: 12 tablet, Rfl: 0    mometasone (NASONEX) 50 mcg/actuation nasal spray, [MOMETASONE (NASONEX) 50 MCG/ACTUATION NASAL SPRAY] 2 sprays into each nostril daily., Disp: , Rfl:     multivitamin with minerals (THERA-M) 9 mg iron-400 mcg Tab tablet, [MULTIVITAMIN WITH MINERALS (THERA-M) 9 MG IRON-400 MCG TAB TABLET] Take 1 tablet by mouth daily., Disp: , Rfl:     nicotine (NICODERM CQ) 21 mg/24 hr, [NICOTINE (NICODERM CQ) 21 MG/24 HR] Place 1 patch on the skin daily as needed for smoking cessation.       , Disp: , Rfl:     omeprazole (PRILOSEC) 20 MG DR capsule, Take 20 mg by mouth daily, Disp: , Rfl:     polyethylene glycol (MIRALAX) 17 gram packet, [POLYETHYLENE GLYCOL (MIRALAX) 17 GRAM PACKET] Take 17 g by mouth 2 (two) times a day.       , Disp: , Rfl:     pravastatin (PRAVACHOL) 40 MG tablet, [PRAVASTATIN (PRAVACHOL) 40 MG TABLET] Take 40 mg by mouth every morning.       , Disp: , Rfl:     predniSONE (DELTASONE) 20 MG tablet, Take 2 tablets (40 mg) by mouth daily, Disp: 8 tablet, Rfl: 0    ramelteon (ROZEREM) 8 mg tablet, [RAMELTEON (ROZEREM) 8 MG TABLET] Take 8 mg by mouth at bedtime., Disp: , Rfl:     SUMAtriptan (IMITREX) 100 MG tablet, [SUMATRIPTAN (IMITREX) 100 MG TABLET] Take 100 mg by mouth every 2  (two) hours as needed for migraine., Disp: , Rfl:     topiramate (TOPAMAX) 200 MG tablet, [TOPIRAMATE (TOPAMAX) 200 MG TABLET] Take 200 mg by mouth 2 (two) times a day., Disp: , Rfl:     traMADol (ULTRAM) 50 MG tablet, Take 50 mg by mouth every 6 hours as needed for severe pain, Disp: , Rfl:     triamcinolone (KENALOG) 0.1 % ointment, [TRIAMCINOLONE (KENALOG) 0.1 % OINTMENT] Apply 1 application topically 2 (two) times a day., Disp: , Rfl:     FAMHX-reviewed     reports that she has been smoking cigarettes. She has never used smokeless tobacco. She reports that she does not currently use alcohol. She reports that she does not currently use drugs.    Review of Systems:  The 12 point review of systems  is within normal limits except for as mentioned above in the HPI.  General ROS: No complaints or constitutional symptoms  Ophthalmic ROS: No complaints of visual changes  Skin: No complaints or symptoms   Endocrine: No complaints or symptoms  Hematologic/Lymphatic: No symptoms or complaints  Psychiatric: No symptoms or complaints  Respiratory ROS: no cough, shortness of breath, or wheezing  Cardiovascular ROS: no chest pain or dyspnea on exertion  Gastrointestinal ROS: As per HPI  Genito-Urinary ROS: no dysuria, trouble voiding, or hematuria  Musculoskeletal ROS: no joint or muscle pain  Neurological ROS: no TIA or stroke symptoms      EXAM:  BP 98/54   Pulse 75   Temp 98.5  F (36.9  C) (Oral)   Resp 20   Wt 63.5 kg (140 lb)   SpO2 96%   BMI 28.28 kg/m    GENERAL: Well developed female, No acute distress, pleasant and conversant   EYES: Pupils equal, round and reactive, no scleral icterus  ABDOMEN: Obese, tender to palpation in the right lower quadrant right mid abdominal region with focal guarding  SKIN: Pink, warm and dry, no obvious rashes or lesions   NEURO:No focal deficits, ambulatory  MUSCULOSKELETAL:No obvious deformities, no swelling, normal appearing      LABS:  Lab Results   Component Value Date    WBC  12.3 09/13/2024    HGB 14.1 09/13/2024    HCT 42.6 09/13/2024    MCV 96 09/13/2024     09/13/2024     INR/Prothrombin Time  Recent Labs   Lab 09/13/24  0715      CO2 20*   BUN 11.8     Lab Results   Component Value Date    ALT 58 (H) 09/13/2024     (H) 09/13/2024    ALKPHOS 153 (H) 09/13/2024       IMAGES:   Relevant images were reviewed and discussed with the patient.  Notable findings were: CT images reviewed and demonstrates an area of concern in the right lower quadrant with small bowel adhered to the cecum    Assessment/Plan:   Rukhsana Griffith is a 63 year old female with abdominal pain focused in the right lower quadrant.  There is an area on the CT scan on my review that is somewhat concerning for inflammation of small amount of pericecal fluid.  There may be a small bubble of free air but is difficult to ascertain.  I am not able to visualize the appendix either.  The small bowel obstruction that is noted appears relatively mild.  At this point I would recommend oral small bowel follow-through protocol to ensure there is no true bowel obstruction.  However, unfortunately appears as though she has an allergy to contrast material.  -  At this point we can maintain vigilance  as this may be secondary to an acute inflammation from her inflammatory bowel disease versus something else more nefarious.    Will continue to follow.          Juan Tobin D.O. FACS  [phone]

## 2024-09-13 NOTE — PROGRESS NOTES
[facility]    Medicine Progress Note - Hospitalist Service    Date of Admission:  9/12/2024    Assessment & Plan   Rukhsana Griffith is a 62 yo female presented with abdominal pain, bloating, dark stools, and coffee ground emesis. Admitted for  2/2 SBO with a concerning h/o possible GI bleed    Abdominal pain  -- Multifactorial: SBO, gastritis, gastroparesis    Small bowel obstruction  -- CT-abd/pelvis: New distal small bowel obstruction, etiology not clearly evident, internal hernia not excluded  -- NPO  -- IVF  -- Surgery consult appreciated.  Addendum 6:33pm  -- Updated surgical team regarding increased nausea, pain and tenderness. Bowel sounds are positive but decreased. Passing gas. Awaiting abdominal Xray. Swing team notified.    Possible Upper GI bleeding  -- melena, coffee ground emesis  -- IV PPI bid  -- IV fluids  -- NPO  -- GI consulted  -- Monitor H/H    Abnormal LFTs  -- Monitor LFTs  -- CT-a/p: Cholecystectomy. Few small calcified granulomata.  -- GI following    Leukocytosis  -- Likely reactive. Trending down  -- Monitor CBC and temp curve    Acute hypoxic respiratory failure  -- CXR: minimal bibasilar atelectasis. No effusions or pneumothorax. Heart size is stable.  -- IS  -- Wean as able  -- Continue nebs for asthma  -- Monitor closely. Minimize narcotic use as able    Gastroparesis with a gastric stimulator in place  -- GI consult appreciated: OP follow up with Dr. Greg Cisneros at [facility] for likely device exchange    IBS-C    GERD  -- Protonix as above    Essential Hypertension  -- BP borderline. Will hold PTA Lisinopril  -- Monitor vital signs per protocol    Mild intermittent Asthma  -- Not in exacerbation  -- C/w PTA Atrovent, Albuterol, Duoneb prn, Symbicort, flonase    DM-II  -- A1c: 6.8% 4 months ago  -- Home insulin: Lantus 50 units at bedtime, Novolog 20 units AC breakfast, 15 units AC lunch and dinner.  -- Blood glucose running . Accu-checks, low sliding  scale, hypoglycemia protocol while NPO     Seizure d/o  -- Topamax   -- Seizure precaution     Hyperlipidemia  -- Not on meds at home    GABRIELLE  -- CPAP    Nicotine dependence  -- Nicoderm    Schizoaffective disorder  -- C/w PTA clozapine when able to take po (As per pharmacist, we do not know when patient took the last dose. Will start at 25 mg daily)                    Diet: NPO for Medical/Clinical Reasons Except for: Meds, Ice Chips    DVT Prophylaxis: Pneumatic Compression Devices  Chatterjee Catheter: Not present  Lines: None     Cardiac Monitoring: ACTIVE order. Indication: Tachyarrhythmias, acute (48 hours)  Code Status: Full Code      Clinically Significant Risk Factors Present on Admission           # Hypercalcemia: corrected calcium is >10.1, will monitor as appropriate    # Hypoalbuminemia: Lowest albumin = 3.1 g/dL at 9/13/2024  7:15 AM, will monitor as appropriate     # Hypertension: Noted on problem list             # Asthma: noted on problem list              Disposition Plan     Medically Ready for Discharge: Anticipated in 2-4 Days             Stephany Vences MD  Hospitalist Service  [facility]  Securely message with [employer] (more info)  Text page via [employer] Paging/Directory   ______________________________________________________________________    Interval History   Patient is new to me today. Chart reviewed.  Patient is seen and examined at bedside.   Denied shortness of breath. She has abdominal pain, bloating. No vomiting.  Plan of care discussed with patient. All questions answered. Pt verbalized understanding.     Physical Exam   Vital Signs: Temp: 98.5  F (36.9  C) Temp src: Oral BP: 98/54 Pulse: 75   Resp: 20 SpO2: 96 % O2 Device: Nasal cannula Oxygen Delivery: 2 LPM  Weight: 140 lbs 0 oz    GEN: Alert and oriented. Not in acute distress.  HEENT: Atraumatic, mucous membrane- moist and pink.  Chest: Bilateral air entry.  CVS: S1S2 regular.   Abdomen: Soft. RLQ tenderness.  No organomegaly. No guarding or rigidity. Bowel sounds active.   Extremities: No pedal edema.  CNS: No involuntary movements.  Skin: no cyanosis or clubbing.     Medical Decision Making       60 MINUTES SPENT BY ME on the date of service doing chart review, history, exam, documentation & further activities per the note.      Data

## 2024-09-14 LAB
ALBUMIN SERPL BCG-MCNC: 3.2 G/DL (ref 3.5–5.2)
ALP SERPL-CCNC: 192 U/L (ref 40–150)
ALT SERPL W P-5'-P-CCNC: 66 U/L (ref 0–50)
ANION GAP SERPL CALCULATED.3IONS-SCNC: 9 MMOL/L (ref 7–15)
AST SERPL W P-5'-P-CCNC: 64 U/L (ref 0–45)
BASOPHILS # BLD AUTO: 0.1 10E3/UL (ref 0–0.2)
BASOPHILS NFR BLD AUTO: 1 %
BILIRUB DIRECT SERPL-MCNC: <0.2 MG/DL (ref 0–0.3)
BILIRUB SERPL-MCNC: 0.3 MG/DL
BUN SERPL-MCNC: 6.4 MG/DL (ref 8–23)
C PNEUM DNA SPEC QL NAA+PROBE: NOT DETECTED
CALCIUM SERPL-MCNC: 8.8 MG/DL (ref 8.8–10.4)
CHLORIDE SERPL-SCNC: 112 MMOL/L (ref 98–107)
CREAT SERPL-MCNC: 0.55 MG/DL (ref 0.51–0.95)
EGFRCR SERPLBLD CKD-EPI 2021: >90 ML/MIN/1.73M2
EOSINOPHIL # BLD AUTO: 0.1 10E3/UL (ref 0–0.7)
EOSINOPHIL NFR BLD AUTO: 1 %
ERYTHROCYTE [DISTWIDTH] IN BLOOD BY AUTOMATED COUNT: 14.2 % (ref 10–15)
FLUAV H1 2009 PAND RNA SPEC QL NAA+PROBE: NOT DETECTED
FLUAV H1 RNA SPEC QL NAA+PROBE: NOT DETECTED
FLUAV H3 RNA SPEC QL NAA+PROBE: NOT DETECTED
FLUAV RNA SPEC QL NAA+PROBE: NOT DETECTED
FLUBV RNA SPEC QL NAA+PROBE: NOT DETECTED
GLUCOSE BLDC GLUCOMTR-MCNC: 105 MG/DL (ref 70–99)
GLUCOSE BLDC GLUCOMTR-MCNC: 125 MG/DL (ref 70–99)
GLUCOSE BLDC GLUCOMTR-MCNC: 141 MG/DL (ref 70–99)
GLUCOSE BLDC GLUCOMTR-MCNC: 149 MG/DL (ref 70–99)
GLUCOSE BLDC GLUCOMTR-MCNC: 90 MG/DL (ref 70–99)
GLUCOSE BLDC GLUCOMTR-MCNC: 94 MG/DL (ref 70–99)
GLUCOSE BLDC GLUCOMTR-MCNC: 97 MG/DL (ref 70–99)
GLUCOSE SERPL-MCNC: 126 MG/DL (ref 70–99)
HADV DNA SPEC QL NAA+PROBE: NOT DETECTED
HCO3 SERPL-SCNC: 22 MMOL/L (ref 22–29)
HCOV PNL SPEC NAA+PROBE: NOT DETECTED
HCT VFR BLD AUTO: 41 % (ref 35–47)
HGB BLD-MCNC: 13.3 G/DL (ref 11.7–15.7)
HGB BLD-MCNC: 13.5 G/DL (ref 11.7–15.7)
HMPV RNA SPEC QL NAA+PROBE: NOT DETECTED
HPIV1 RNA SPEC QL NAA+PROBE: NOT DETECTED
HPIV2 RNA SPEC QL NAA+PROBE: NOT DETECTED
HPIV3 RNA SPEC QL NAA+PROBE: NOT DETECTED
HPIV4 RNA SPEC QL NAA+PROBE: NOT DETECTED
IMM GRANULOCYTES # BLD: 0.1 10E3/UL
IMM GRANULOCYTES NFR BLD: 1 %
LYMPHOCYTES # BLD AUTO: 2.1 10E3/UL (ref 0.8–5.3)
LYMPHOCYTES NFR BLD AUTO: 19 %
M PNEUMO DNA SPEC QL NAA+PROBE: NOT DETECTED
MAGNESIUM SERPL-MCNC: 1.6 MG/DL (ref 1.7–2.3)
MCH RBC QN AUTO: 31.9 PG (ref 26.5–33)
MCHC RBC AUTO-ENTMCNC: 32.9 G/DL (ref 31.5–36.5)
MCV RBC AUTO: 97 FL (ref 78–100)
MONOCYTES # BLD AUTO: 1.1 10E3/UL (ref 0–1.3)
MONOCYTES NFR BLD AUTO: 10 %
NEUTROPHILS # BLD AUTO: 7.5 10E3/UL (ref 1.6–8.3)
NEUTROPHILS NFR BLD AUTO: 69 %
NRBC # BLD AUTO: 0 10E3/UL
NRBC BLD AUTO-RTO: 0 /100
PLATELET # BLD AUTO: 191 10E3/UL (ref 150–450)
POTASSIUM SERPL-SCNC: 3.4 MMOL/L (ref 3.4–5.3)
POTASSIUM SERPL-SCNC: 3.5 MMOL/L (ref 3.4–5.3)
PROT SERPL-MCNC: 6.1 G/DL (ref 6.4–8.3)
RBC # BLD AUTO: 4.23 10E6/UL (ref 3.8–5.2)
RSV RNA SPEC QL NAA+PROBE: NOT DETECTED
RSV RNA SPEC QL NAA+PROBE: NOT DETECTED
RV+EV RNA SPEC QL NAA+PROBE: NOT DETECTED
SODIUM SERPL-SCNC: 143 MMOL/L (ref 135–145)
WBC # BLD AUTO: 10.8 10E3/UL (ref 4–11)

## 2024-09-14 PROCEDURE — 99231 SBSQ HOSP IP/OBS SF/LOW 25: CPT

## 2024-09-14 PROCEDURE — 36415 COLL VENOUS BLD VENIPUNCTURE: CPT | Performed by: STUDENT IN AN ORGANIZED HEALTH CARE EDUCATION/TRAINING PROGRAM

## 2024-09-14 PROCEDURE — 258N000003 HC RX IP 258 OP 636: Performed by: STUDENT IN AN ORGANIZED HEALTH CARE EDUCATION/TRAINING PROGRAM

## 2024-09-14 PROCEDURE — 250N000013 HC RX MED GY IP 250 OP 250 PS 637: Performed by: STUDENT IN AN ORGANIZED HEALTH CARE EDUCATION/TRAINING PROGRAM

## 2024-09-14 PROCEDURE — 120N000001 HC R&B MED SURG/OB

## 2024-09-14 PROCEDURE — 87633 RESP VIRUS 12-25 TARGETS: CPT | Performed by: STUDENT IN AN ORGANIZED HEALTH CARE EDUCATION/TRAINING PROGRAM

## 2024-09-14 PROCEDURE — 99233 SBSQ HOSP IP/OBS HIGH 50: CPT | Performed by: STUDENT IN AN ORGANIZED HEALTH CARE EDUCATION/TRAINING PROGRAM

## 2024-09-14 PROCEDURE — 250N000011 HC RX IP 250 OP 636: Performed by: INTERNAL MEDICINE

## 2024-09-14 PROCEDURE — 83735 ASSAY OF MAGNESIUM: CPT | Performed by: STUDENT IN AN ORGANIZED HEALTH CARE EDUCATION/TRAINING PROGRAM

## 2024-09-14 PROCEDURE — 250N000009 HC RX 250: Performed by: INTERNAL MEDICINE

## 2024-09-14 PROCEDURE — 82962 GLUCOSE BLOOD TEST: CPT

## 2024-09-14 PROCEDURE — 84132 ASSAY OF SERUM POTASSIUM: CPT | Performed by: STUDENT IN AN ORGANIZED HEALTH CARE EDUCATION/TRAINING PROGRAM

## 2024-09-14 PROCEDURE — 87581 M.PNEUMON DNA AMP PROBE: CPT | Performed by: STUDENT IN AN ORGANIZED HEALTH CARE EDUCATION/TRAINING PROGRAM

## 2024-09-14 PROCEDURE — 85025 COMPLETE CBC W/AUTO DIFF WBC: CPT | Performed by: STUDENT IN AN ORGANIZED HEALTH CARE EDUCATION/TRAINING PROGRAM

## 2024-09-14 PROCEDURE — 250N000012 HC RX MED GY IP 250 OP 636 PS 637: Performed by: STUDENT IN AN ORGANIZED HEALTH CARE EDUCATION/TRAINING PROGRAM

## 2024-09-14 PROCEDURE — 80076 HEPATIC FUNCTION PANEL: CPT | Performed by: STUDENT IN AN ORGANIZED HEALTH CARE EDUCATION/TRAINING PROGRAM

## 2024-09-14 RX ORDER — PROCHLORPERAZINE 25 MG
25 SUPPOSITORY, RECTAL RECTAL EVERY 12 HOURS PRN
Status: DISCONTINUED | OUTPATIENT
Start: 2024-09-14 | End: 2024-09-15 | Stop reason: HOSPADM

## 2024-09-14 RX ORDER — PROCHLORPERAZINE MALEATE 10 MG
10 TABLET ORAL EVERY 6 HOURS PRN
Status: DISCONTINUED | OUTPATIENT
Start: 2024-09-14 | End: 2024-09-15 | Stop reason: HOSPADM

## 2024-09-14 RX ORDER — POTASSIUM CHLORIDE 1500 MG/1
40 TABLET, EXTENDED RELEASE ORAL ONCE
Status: COMPLETED | OUTPATIENT
Start: 2024-09-14 | End: 2024-09-14

## 2024-09-14 RX ADMIN — MORPHINE SULFATE 2 MG: 2 INJECTION, SOLUTION INTRAMUSCULAR; INTRAVENOUS at 10:04

## 2024-09-14 RX ADMIN — NICOTINE 1 PATCH: 14 PATCH, EXTENDED RELEASE TRANSDERMAL at 08:42

## 2024-09-14 RX ADMIN — INSULIN ASPART 1 UNITS: 100 INJECTION, SOLUTION INTRAVENOUS; SUBCUTANEOUS at 02:51

## 2024-09-14 RX ADMIN — SODIUM CHLORIDE: 9 INJECTION, SOLUTION INTRAVENOUS at 07:27

## 2024-09-14 RX ADMIN — POTASSIUM CHLORIDE 40 MEQ: 1500 TABLET, EXTENDED RELEASE ORAL at 17:36

## 2024-09-14 RX ADMIN — IPRATROPIUM BROMIDE 1 PUFF: 17 AEROSOL, METERED RESPIRATORY (INHALATION) at 08:41

## 2024-09-14 RX ADMIN — PANTOPRAZOLE SODIUM 40 MG: 40 INJECTION, POWDER, FOR SOLUTION INTRAVENOUS at 20:55

## 2024-09-14 RX ADMIN — INSULIN ASPART 1 UNITS: 100 INJECTION, SOLUTION INTRAVENOUS; SUBCUTANEOUS at 06:02

## 2024-09-14 RX ADMIN — FLUTICASONE PROPIONATE 2 SPRAY: 50 SPRAY, METERED NASAL at 08:41

## 2024-09-14 RX ADMIN — TOPIRAMATE 200 MG: 100 TABLET, FILM COATED ORAL at 08:42

## 2024-09-14 RX ADMIN — PANTOPRAZOLE SODIUM 40 MG: 40 INJECTION, POWDER, FOR SOLUTION INTRAVENOUS at 08:41

## 2024-09-14 RX ADMIN — MORPHINE SULFATE 2 MG: 2 INJECTION, SOLUTION INTRAMUSCULAR; INTRAVENOUS at 04:05

## 2024-09-14 ASSESSMENT — ACTIVITIES OF DAILY LIVING (ADL)
ADLS_ACUITY_SCORE: 40
ADLS_ACUITY_SCORE: 29
ADLS_ACUITY_SCORE: 40
ADLS_ACUITY_SCORE: 29
ADLS_ACUITY_SCORE: 40
ADLS_ACUITY_SCORE: 29
ADLS_ACUITY_SCORE: 40
ADLS_ACUITY_SCORE: 29
ADLS_ACUITY_SCORE: 40
ADLS_ACUITY_SCORE: 29
ADLS_ACUITY_SCORE: 29
ADLS_ACUITY_SCORE: 40
ADLS_ACUITY_SCORE: 29

## 2024-09-14 NOTE — PROGRESS NOTES
Patient refused NG tube.  GI doctor also spoke with patient at bedside re: NG tube.  Patient refused intervention.  Dr. Vences updated.

## 2024-09-14 NOTE — PLAN OF CARE
Goal Outcome Evaluation:     [id number]... Pt is a/o x4, insisted to go outside with the IV at the start of the shift but, staff told her that she has to go with a staff member, She refused initially but agreed later. Pt went outside with one of the nurses for 10-15 minutes. Per nurse, pt smoked while she was outside, nicotine patch was removed by the nurse. Pt refused  the continuous IV fluid and telemetry, currently she's saline lock. Pt also had a medium black hard stool at 1800. Pt is NPO except for meds but she's not following the order, pt is drinking water from the sink in her room.

## 2024-09-14 NOTE — PROGRESS NOTES
"[facility] Progress Note       SUBJECTIVE:  Patient reports ongoing constant, severe pain. No further vomiting.        OBJECTIVE:  /87 (BP Location: Right arm)   Pulse 78   Temp 98.2  F (36.8  C) (Oral)   Resp 26   Ht 1.499 m (4' 11.02\")   Wt 70.2 kg (154 lb 12.2 oz)   SpO2 95%   BMI 31.24 kg/m    Temp (24hrs), Av.1  F (36.7  C), Min:97.8  F (36.6  C), Max:98.3  F (36.8  C)    Patient Vitals for the past 72 hrs:   Weight   24 1350 70.2 kg (154 lb 12.2 oz)   24 63.5 kg (140 lb)       Intake/Output Summary (Last 24 hours) at 2024 1407  Last data filed at 2024 0905  Gross per 24 hour   Intake 1600 ml   Output 100 ml   Net 1500 ml        PHYSICAL EXAM  GEN: NAD, female appears stated age lying in bed  HRT: no LE edema  RESP: unlabored, O2 via NC  ABD: mildly distended, soft, diffusely tender  SKIN: No rash or jaundice      Additional Data:  I have reviewed the patient's new clinical lab results:     Recent Labs   Lab Test 24  0924  2352 24  1811 24  1223 24  2041 23  0435 23  2230 23  2347 19  1630   WBC 10.8  --   --   --  12.3* 16.7*   < > 16.2*   < > 14.6*   HGB 13.5 13.3 13.0   < > 14.1 15.0   < > 14.2   < > 12.7   MCV 97  --   --   --  96 93   < > 92   < > 90     --   --   --  196 249   < > 263   < > 249   INR  --   --   --   --   --  0.94  --  0.90  --  1.07    < > = values in this interval not displayed.     Recent Labs   Lab Test 24  0924   POTASSIUM 3.4 3.6 3.4   CHLORIDE 112* 108* 102   CO2 22 20* 22   BUN 6.4* 11.8 13.2   ANIONGAP 9 7 9     Recent Labs   Lab Test 24  0901 24  0715 24  2217 23  0104 23  0046 23  0101 23  0021 23  2347 23   ALBUMIN 3.2* 3.1* 3.8   < >  --  3.7  --  3.6 3.6   < >  --    BILITOTAL 0.3 0.3 0.2   < >  --  <0.2  --  0.2 <0.2   " < >  --    ALT 66* 58* 20   < >  --  22  --  17 21   < >  --    AST 64* 146* 13   < >  --  27  --  22 20   < >  --    PROTEIN  --   --   --   --  Negative  --  Negative  --   --   --  Negative   LIPASE  --   --   --   --   --  14  --  12* 19   < >  --     < > = values in this interval not displayed.     Gastric neurostimulator interrogated 9/13/24:   Voltage 5.0    Rate 14  Cycle on 0.1s  Cycle off 5.0s  Impedance 402  Electrode impedance: abnormal    Imaging results:  Abdominal xray 9/13/24:  IMPRESSION: Mildly dilated small bowel in the right lower abdomen, similar to the prior CT. No free air, pneumatosis, or portomesenteric gas. Stimulator devices in the anterior abdominal wall and posterior to the left iliac wing.     CXR 9/13/24:  IMPRESSION: Minimal bibasilar atelectasis/scarring, left greater than right. No effusions or pneumothorax. Heart size is stable. Atherosclerosis. Cholecystectomy. Partially visualized gastric pacer leads. These are better assessed on recent CT.     CT abdomen/pelvis w/o 9/12/24:  FINDINGS:   LOWER CHEST: Normal.  HEPATOBILIARY: Prior cholecystectomy. Few small calcified granulomata.  PANCREAS: Normal.  SPLEEN: Small calcified granulomata.  ADRENAL GLANDS: Tiny benign right adrenal adenoma unchanged.  KIDNEYS/BLADDER: 2 mm nonobstructing stone lower pole right kidney, no right hydronephrosis.  Left kidney normal. Bladder negative.  BOWEL: Dilated loops of fluid-filled small bowel are present within right lower quadrant consistent with small bowel obstruction, caliber of to 3.5 cm. Etiology not clearly evident. Involving bowel loops have a somewhat C-shaped configuration raising   question of possible internal hernia though a definitive hernia diagnosis cannot be made. Proximal small bowel appears normal in caliber. Wisp of mesenteric edema within the involved region. No free fluid or free air. Colon unremarkable. Gastric   pacemaker in stable position.  LYMPH NODES:  Normal.  VASCULATURE: Normal.  PELVIC ORGANS: No pelvic mass. No free fluid.  MUSCULOSKELETAL: Bilateral InterStim wires present with only left-sided power generator in place. No change in the focus of subcutaneous soft tissue stranding mid left abdomen likely a zone of fibrosis and may be a site of a previous implant..                                                                   IMPRESSION:   1.  New distal small bowel obstruction, etiology not clearly evident, internal hernia not excluded.          IMPRESSION:  SBO  GI bleed  This is a 62 y/o female with PMH HTN, type 2 diabetes, seizure disorder, COPD, CVA, GERD, gastroparesis s/p gastric neurostimulator placement, IBS-C, chronic pain, schizoaffective disorder, (no documented Crohn's disease through [facility]) admitted 9/12 for SBO and GI bleed after presenting with abdominal pain and hematemesis.      SBO- CT scan shows distal small bowel obstruction in the RLQ, which is the area of her pain. She does not have Crohn's disease per [facility] record review nor recent colonoscopy in 2021. Surgery following, no SBFT due to contrast allergy. She had worsening symptoms yesterday afternoon, subsequent CXR/AXR negative for perforation. She has ongoing severe pain today but refuses NG tube for decompression at this point.   GI bleed- She reports hematemesis after multiple episodes of vomiting so most likely due to Ashley leung tear vs PUD, gastritis, erosive esophagitis. Given possible SBO and stable hemoglobin (13-14 range) no plan for endoscopic evaluation at this time. Recommend ongoing conservative management with IV PPI.  Gastroparesis- She has known gastroparesis s/p gastric neurostimulator since 2013 with battery/device change 2015. Neurostimulator interrogated 9/13 and impedance suggests possible issue with her leads and imaging shows inappropriate crossing of leads, which may or may not be contributing to her symptoms. Given the age of her device battery life  interrogation is not reliable and it is most likely that the neurostimulator is not functioning without battery change since 2015 and lead-crossing also an issue. She will need outpatient follow up with Dr. Greg Cisneros at [facility] for likely device exchange.       PLAN:  - NPO  - Continue pantoprazole 40 mg IV BID  - Recommend NG tube if ongoing pain- pt refuses at this time  - Supportive cares per medicine  - Outpatient surgery follow up for gastric neurostimulator exchange ([facility] will help facilitate this)       (Dr. Shukla)  Natalie Chiang PA-C  [facility]  9/14/2024 2:07 PM  [phone] (office)    35 minutes of total time was spent providing patient care, including patient evaluation, reviewing documentation/test results, , and documentation.  ________________________________________________________________________

## 2024-09-14 NOTE — PLAN OF CARE
Goal Outcome Evaluation:    Patient is alert, intermittently confused and forgetful. Complained of abdominal and generalized pain, 2 mg of Morphine IV given x 2 and was effective. No nausea, bowel sounds are hypoactive and distant. Patient remains NPO, IV NS is infusing.      Problem: Intestinal Obstruction  Goal: Optimal Bowel Function  Outcome: Progressing  Intervention: Promote Bowel Function  Recent Flowsheet Documentation  Taken 9/14/2024 0405 by Evonne Galvin RN  Body Position: position changed independently  Head of Bed (HOB) Positioning: HOB at 20 degrees  Positioning/Transfer Devices:     Problem: Comorbidity Management  Goal: Maintenance of COPD Symptom Control  Outcome: Progressing  Intervention: Maintain COPD Symptom Control  Recent Flowsheet Documentation  Taken 9/14/2024 0405 by Evonne Galvin RN  Supportive Measures: active listening utilized  Medication Review/Management: medications reviewed     Problem: Adult Inpatient Plan of Care  Goal: Absence of Hospital-Acquired Illness or Injury  Intervention: Prevent Skin Injury  Recent Flowsheet Documentation  Taken 9/14/2024 0405 by Evonne Galvin RN  Body Position: position changed independently     Problem: Intestinal Obstruction  Goal: Optimal Pain Control and Function  Intervention: Prevent or Manage Pain  Recent Flowsheet Documentation  Taken 9/14/2024 0405 by Evonne Galvin RN  Pain Management Interventions: medication (see MAR)

## 2024-09-14 NOTE — PROGRESS NOTES
[facility]    Medicine Progress Note - Hospitalist Service    Date of Admission:  9/12/2024    Assessment & Plan   Rukhsana Griffith is a 62 yo female presented with abdominal pain, bloating, dark stools, and coffee ground emesis. Admitted for  2/2 SBO with a concerning h/o possible GI bleed    Abdominal pain  -- Multifactorial: SBO, gastritis, gastroparesis    Small bowel obstruction  -- CT-abd/pelvis: New distal small bowel obstruction, etiology not clearly evident, internal hernia not excluded  -- NPO  -- IVF  -- Surgery consult appreciated.    Possible Upper GI bleeding  -- melena, coffee ground emesis  -- IV PPI bid  -- IV fluids  -- NPO  -- GI consulted  -- Monitor H/H  -- 09/14: No bleeding. H/H stable    Abnormal LFTs  -- Monitor LFTs  -- CT-a/p: Cholecystectomy. Few small calcified granulomata.  -- GI following    Leukocytosis-resolved  -- Likely reactive. Trending down  -- Monitor CBC and temp curve    Acute hypoxic respiratory failure  -- CXR: minimal bibasilar atelectasis. No effusions or pneumothorax. Heart size is stable.  -- COVID negative. Viral pcr pending  -- IS  -- Wean as able  -- Continue nebs for asthma  -- Monitor closely. Minimize narcotic use as able    Gastroparesis with a gastric stimulator in place  -- GI consult appreciated: OP follow up with Dr. Greg Cisneros at [facility] for likely device exchange    IBS-C    GERD  -- Protonix as above    Essential Hypertension  -- BP borderline. Will hold PTA Lisinopril  -- Monitor vital signs per protocol    Mild intermittent Asthma  -- Not in exacerbation  -- C/w PTA Atrovent, Albuterol, Duoneb prn, Symbicort, flonase    DM-II  -- A1c: 6.8% 4 months ago  -- Home insulin: Lantus 50 units at bedtime, Novolog 20 units AC breakfast, 15 units AC lunch and dinner.  -- Blood glucose running . Accu-checks, low sliding scale, hypoglycemia protocol while NPO     Seizure d/o  -- Topamax   -- Seizure precaution     Hyperlipidemia  --  "Not on meds at home    GABRIELLE  -- CPAP    Nicotine dependence  -- Nicoderm    Schizoaffective disorder  -- C/w PTA clozapine when able to take po (As per pharmacist, we do not know when patient took the last dose. Will start at 25 mg daily)                    Diet: NPO for Medical/Clinical Reasons Except for: Meds, Ice Chips    DVT Prophylaxis: Pneumatic Compression Devices  Chatterjee Catheter: Not present  Lines: None     Cardiac Monitoring: ACTIVE order. Indication: Tachyarrhythmias, acute (48 hours)  Code Status: Full Code      Clinically Significant Risk Factors           # Hypercalcemia: corrected calcium is >10.1, will monitor as appropriate    # Hypoalbuminemia: Lowest albumin = 3.1 g/dL at 9/13/2024  7:15 AM, will monitor as appropriate     # Hypertension: Noted on problem list           # Obesity: Estimated body mass index is 31.24 kg/m  as calculated from the following:    Height as of this encounter: 1.499 m (4' 11.02\").    Weight as of this encounter: 70.2 kg (154 lb 12.2 oz)., PRESENT ON ADMISSION     # COPD: noted on problem list              Disposition Plan     Medically Ready for Discharge: Anticipated in 2-4 Days             Stephany Vences MD  Hospitalist Service  [facility]  Securely message with [employer] (more info)  Text page via [employer] Paging/Directory   ______________________________________________________________________    Interval History   Patient is seen and examined at bedside.   Had one episode of clear vomiting about 100 ml per RN. Pt declined NGT placement. Pt requested to receive compazine and she is not allergic.   Plan of care discussed with patient. All questions answered. Pt verbalized understanding.     Physical Exam   Vital Signs: Temp: 98.2  F (36.8  C) Temp src: Oral BP: 132/87 Pulse: 78   Resp: 26 SpO2: 95 % O2 Device: Nasal cannula Oxygen Delivery: 3 LPM  Weight: 154 lbs 12.21 oz    GEN: Alert and oriented. Not in acute distress.  HEENT: Atraumatic, " mucous membrane- moist and pink.  Chest: Bilateral air entry.  CVS: S1S2 regular.   Abdomen: Soft. RLQ and infraumbilical tenderness. No organomegaly. No guarding or rigidity. Bowel sounds active.   Extremities: No pedal edema.  CNS: No involuntary movements.  Skin: no cyanosis or clubbing.     Medical Decision Making       52 MINUTES SPENT BY ME on the date of service doing chart review, history, exam, documentation & further activities per the note.      Data

## 2024-09-14 NOTE — SIGNIFICANT EVENT
Significant Event Note    Time of event: 4:42 PM September 14, 2024    Description of event:  I was notified by RN that pt want to be discharged. I already saw the patient twice in person earlier and she has significant abdominal pain and tenderness, and had an episode of vomiting. Pt declined NGT placement.  I spoke with the pt over the phone this time. I explained the need for staying in the hospital. Patient agreed.    Plan:  Continue same plan as outlined in PN      Discussed with: bedside nurse    Stephany Vences MD

## 2024-09-14 NOTE — PROGRESS NOTES
"Patient called writer to room and states \"you are the only one that can answer this.  Who is leaking my information out.\"  \"I do not want any information given to anyone including Taty Garcia, I want her removed from my list.\"  Patient explained the HIPAA  Rights and regulations.  Patient registration was informed and they will reach out to patient with all concerns.   "

## 2024-09-15 VITALS
OXYGEN SATURATION: 95 % | HEART RATE: 77 BPM | SYSTOLIC BLOOD PRESSURE: 148 MMHG | DIASTOLIC BLOOD PRESSURE: 65 MMHG | HEIGHT: 59 IN | BODY MASS INDEX: 31.2 KG/M2 | WEIGHT: 154.76 LBS | RESPIRATION RATE: 18 BRPM | TEMPERATURE: 98.2 F

## 2024-09-15 LAB
ALBUMIN SERPL BCG-MCNC: 3.4 G/DL (ref 3.5–5.2)
ALP SERPL-CCNC: 180 U/L (ref 40–150)
ALT SERPL W P-5'-P-CCNC: 50 U/L (ref 0–50)
ANION GAP SERPL CALCULATED.3IONS-SCNC: 14 MMOL/L (ref 7–15)
AST SERPL W P-5'-P-CCNC: 42 U/L (ref 0–45)
BASOPHILS # BLD AUTO: 0.1 10E3/UL (ref 0–0.2)
BASOPHILS NFR BLD AUTO: 1 %
BILIRUB DIRECT SERPL-MCNC: <0.2 MG/DL (ref 0–0.3)
BILIRUB SERPL-MCNC: 0.4 MG/DL
BUN SERPL-MCNC: 5.7 MG/DL (ref 8–23)
CALCIUM SERPL-MCNC: 9.3 MG/DL (ref 8.8–10.4)
CHLORIDE SERPL-SCNC: 108 MMOL/L (ref 98–107)
CREAT SERPL-MCNC: 0.52 MG/DL (ref 0.51–0.95)
EGFRCR SERPLBLD CKD-EPI 2021: >90 ML/MIN/1.73M2
EOSINOPHIL # BLD AUTO: 0.2 10E3/UL (ref 0–0.7)
EOSINOPHIL NFR BLD AUTO: 2 %
ERYTHROCYTE [DISTWIDTH] IN BLOOD BY AUTOMATED COUNT: 13.9 % (ref 10–15)
GLUCOSE BLDC GLUCOMTR-MCNC: 108 MG/DL (ref 70–99)
GLUCOSE BLDC GLUCOMTR-MCNC: 82 MG/DL (ref 70–99)
GLUCOSE BLDC GLUCOMTR-MCNC: 94 MG/DL (ref 70–99)
GLUCOSE SERPL-MCNC: 94 MG/DL (ref 70–99)
HCO3 SERPL-SCNC: 18 MMOL/L (ref 22–29)
HCT VFR BLD AUTO: 40.3 % (ref 35–47)
HGB BLD-MCNC: 13 G/DL (ref 11.7–15.7)
HOLD SPECIMEN: NORMAL
IMM GRANULOCYTES # BLD: 0.1 10E3/UL
IMM GRANULOCYTES NFR BLD: 1 %
LYMPHOCYTES # BLD AUTO: 2.2 10E3/UL (ref 0.8–5.3)
LYMPHOCYTES NFR BLD AUTO: 21 %
MCH RBC QN AUTO: 31.6 PG (ref 26.5–33)
MCHC RBC AUTO-ENTMCNC: 32.3 G/DL (ref 31.5–36.5)
MCV RBC AUTO: 98 FL (ref 78–100)
MONOCYTES # BLD AUTO: 1 10E3/UL (ref 0–1.3)
MONOCYTES NFR BLD AUTO: 9 %
NEUTROPHILS # BLD AUTO: 7.4 10E3/UL (ref 1.6–8.3)
NEUTROPHILS NFR BLD AUTO: 68 %
NRBC # BLD AUTO: 0 10E3/UL
NRBC BLD AUTO-RTO: 0 /100
PLATELET # BLD AUTO: 194 10E3/UL (ref 150–450)
POTASSIUM SERPL-SCNC: 3.4 MMOL/L (ref 3.4–5.3)
PROT SERPL-MCNC: 6.3 G/DL (ref 6.4–8.3)
RBC # BLD AUTO: 4.12 10E6/UL (ref 3.8–5.2)
SODIUM SERPL-SCNC: 140 MMOL/L (ref 135–145)
WBC # BLD AUTO: 10.9 10E3/UL (ref 4–11)

## 2024-09-15 PROCEDURE — 85025 COMPLETE CBC W/AUTO DIFF WBC: CPT | Performed by: STUDENT IN AN ORGANIZED HEALTH CARE EDUCATION/TRAINING PROGRAM

## 2024-09-15 PROCEDURE — 80053 COMPREHEN METABOLIC PANEL: CPT | Performed by: STUDENT IN AN ORGANIZED HEALTH CARE EDUCATION/TRAINING PROGRAM

## 2024-09-15 PROCEDURE — 99231 SBSQ HOSP IP/OBS SF/LOW 25: CPT

## 2024-09-15 PROCEDURE — 250N000013 HC RX MED GY IP 250 OP 250 PS 637: Performed by: STUDENT IN AN ORGANIZED HEALTH CARE EDUCATION/TRAINING PROGRAM

## 2024-09-15 PROCEDURE — 250N000009 HC RX 250: Performed by: INTERNAL MEDICINE

## 2024-09-15 PROCEDURE — 99239 HOSP IP/OBS DSCHRG MGMT >30: CPT | Performed by: STUDENT IN AN ORGANIZED HEALTH CARE EDUCATION/TRAINING PROGRAM

## 2024-09-15 PROCEDURE — 36415 COLL VENOUS BLD VENIPUNCTURE: CPT | Performed by: STUDENT IN AN ORGANIZED HEALTH CARE EDUCATION/TRAINING PROGRAM

## 2024-09-15 RX ORDER — CLOZAPINE 25 MG/1
125 TABLET ORAL AT BEDTIME
Status: SHIPPED
Start: 2024-09-15

## 2024-09-15 RX ORDER — LISINOPRIL 5 MG/1
10 TABLET ORAL DAILY
Status: DISCONTINUED | OUTPATIENT
Start: 2024-09-15 | End: 2024-09-15 | Stop reason: HOSPADM

## 2024-09-15 RX ORDER — POLYETHYLENE GLYCOL 3350 17 G/17G
17 POWDER, FOR SOLUTION ORAL DAILY
Status: SHIPPED
Start: 2024-09-15

## 2024-09-15 RX ORDER — CLOZAPINE 25 MG/1
50 TABLET ORAL AT BEDTIME
Status: DISCONTINUED | OUTPATIENT
Start: 2024-09-15 | End: 2024-09-15

## 2024-09-15 RX ORDER — OMEPRAZOLE 20 MG/1
20 TABLET, DELAYED RELEASE ORAL DAILY
Qty: 30 TABLET | Refills: 1 | Status: SHIPPED | OUTPATIENT
Start: 2024-09-15

## 2024-09-15 RX ORDER — TOPIRAMATE 50 MG/1
150 TABLET, FILM COATED ORAL 2 TIMES DAILY
Status: SHIPPED
Start: 2024-09-15

## 2024-09-15 RX ADMIN — FLUTICASONE PROPIONATE 2 SPRAY: 50 SPRAY, METERED NASAL at 09:50

## 2024-09-15 RX ADMIN — ALBUTEROL SULFATE 2 PUFF: 90 AEROSOL, METERED RESPIRATORY (INHALATION) at 09:51

## 2024-09-15 RX ADMIN — IPRATROPIUM BROMIDE 1 PUFF: 17 AEROSOL, METERED RESPIRATORY (INHALATION) at 13:35

## 2024-09-15 RX ADMIN — CLOZAPINE 50 MG: 25 TABLET ORAL at 01:32

## 2024-09-15 RX ADMIN — ALBUTEROL SULFATE 2 PUFF: 90 AEROSOL, METERED RESPIRATORY (INHALATION) at 00:11

## 2024-09-15 RX ADMIN — IPRATROPIUM BROMIDE 1 PUFF: 17 AEROSOL, METERED RESPIRATORY (INHALATION) at 09:55

## 2024-09-15 RX ADMIN — PANTOPRAZOLE SODIUM 40 MG: 40 INJECTION, POWDER, FOR SOLUTION INTRAVENOUS at 09:52

## 2024-09-15 RX ADMIN — NICOTINE 1 PATCH: 14 PATCH, EXTENDED RELEASE TRANSDERMAL at 09:49

## 2024-09-15 RX ADMIN — TOPIRAMATE 200 MG: 100 TABLET, FILM COATED ORAL at 01:11

## 2024-09-15 RX ADMIN — MONTELUKAST 10 MG: 10 TABLET, FILM COATED ORAL at 01:10

## 2024-09-15 RX ADMIN — LISINOPRIL 10 MG: 5 TABLET ORAL at 13:33

## 2024-09-15 ASSESSMENT — ACTIVITIES OF DAILY LIVING (ADL)
ADLS_ACUITY_SCORE: 29

## 2024-09-15 NOTE — PROGRESS NOTES
General Surgery Progress Note:    Hospital Day # 3    ASSESSMENT:   1. Chronic seizure disorder with history of head trauma (H)    2. Small bowel obstruction (H)    3. Schizoaffective disorder, chronic condition (H)    4. Constipation, unspecified constipation type    5. Gastroesophageal reflux disease with esophagitis without hemorrhage      Rukhsana Griffith is a 63 year old female with a history of Crohn's disease, gastroparesis s/p gastric stimulator placement who presents with a mild SBO. Afebrile and no tachycardia. Labs notable for no leukocytosis. Denies abdominal pain and non-tender on exam today. Passing gas and having multiple BMs. Per chart review it was noted she had a black stool but patient states it was a brown/green stool. Appears SBO and symptoms have resolved. Okay to discharge from a surgical standpoint when deemed medically appropriate.    PLAN:   - OK for diet as tolerated per surgery  - Encourage activity and ambulation to promote bowel function  - Medical management per primary team  - OK to discharge from a surgical standpoint when deemed medically appropriate    SUBJECTIVE:   Rukhsana Griffith is doing much better today. Denies abdominal pain and states it resolved overnight. Passing gas and having multiple BMs. Voiding with no issues. Denies fever, chills. Is eager to discharge today.    Patient Vitals for the past 24 hrs:   BP Temp Temp src Pulse Resp SpO2   09/15/24 1323 (!) 148/65 -- -- 77 -- --   09/15/24 1100 (!) 172/74 -- -- 83 18 95 %   09/15/24 1000 (!) 147/66 98.2  F (36.8  C) Oral 83 20 92 %   09/15/24 0003 (!) 147/77 98.4  F (36.9  C) Oral 78 20 91 %   09/14/24 1530 (!) 140/81 98.7  F (37.1  C) Oral 94 22 94 %     Physical Exam:  General: patient seen resting in bed, no acute distress  Resp: no respiratory distress, breathing comfortably on RA  Abdomen: soft, non-distended, non-tender, no rebound tenderness or guarding  Extremities: warm and well perfused    No results displayed  because visit has over 200 results.         JEAN Richardson [facility] General Surgery  [address] [address]

## 2024-09-15 NOTE — PROGRESS NOTES
"[facility] Progress Note       SUBJECTIVE:  Patient is feeling much better, no further vomiting and pain improved. Tolerating clear liquid diet. Per chart she had a black stool, hgb still stable. Patient would like to go home.        OBJECTIVE:  BP (!) 172/74 (BP Location: Right arm)   Pulse 83   Temp 98.2  F (36.8  C) (Oral)   Resp 18   Ht 1.499 m (4' 11.02\")   Wt 70.2 kg (154 lb 12.2 oz)   SpO2 95%   BMI 31.24 kg/m    Temp (24hrs), Av.1  F (36.7  C), Min:97.8  F (36.6  C), Max:98.3  F (36.8  C)    Patient Vitals for the past 72 hrs:   Weight   24 1350 70.2 kg (154 lb 12.2 oz)   24 63.5 kg (140 lb)       Intake/Output Summary (Last 24 hours) at 2024 1407  Last data filed at 2024 0905  Gross per 24 hour   Intake 1600 ml   Output 100 ml   Net 1500 ml        PHYSICAL EXAM  GEN: NAD, female appears stated age sitting on edge of bed eating lunch tray  HRT: no LE edema  RESP: unlabored, O2 via NC  ABD: nondistended  SKIN: No rash or jaundice      Additional Data:  I have reviewed the patient's new clinical lab results:     Recent Labs   Lab Test 09/15/24  0702 24  0901 24  2352 24  1223 24  0715 24  2041 23  0435 23  2230 23  2347 19  1630   WBC 10.9 10.8  --   --  12.3* 16.7*   < > 16.2*   < > 14.6*   HGB 13.0 13.5 13.3   < > 14.1 15.0   < > 14.2   < > 12.7   MCV 98 97  --   --  96 93   < > 92   < > 90    191  --   --  196 249   < > 263   < > 249   INR  --   --   --   --   --  0.94  --  0.90  --  1.07    < > = values in this interval not displayed.     Recent Labs   Lab Test 09/15/24  0702 24  2245 24  0901 24  0715   POTASSIUM 3.4 3.5 3.4 3.6   CHLORIDE 108*  --  112* 108*   CO2 18*  --  22 20*   BUN 5.7*  --  6.4* 11.8   ANIONGAP 14  --  9 7     Recent Labs   Lab Test 09/15/24  0702 24  0901 24  0715 23  2230 23  2217 23  0104 23  0046 23  0101 " 05/02/23  0021 03/04/23  2347 03/04/23  2238   ALBUMIN 3.4* 3.2* 3.1*   < >  --  3.7  --  3.6 3.6   < >  --    BILITOTAL 0.4 0.3 0.3   < >  --  <0.2  --  0.2 <0.2   < >  --    ALT 50 66* 58*   < >  --  22  --  17 21   < >  --    AST 42 64* 146*   < >  --  27  --  22 20   < >  --    PROTEIN  --   --   --   --  Negative  --  Negative  --   --   --  Negative   LIPASE  --   --   --   --   --  14  --  12* 19   < >  --     < > = values in this interval not displayed.     Gastric neurostimulator interrogated 9/13/24:   Voltage 5.0    Rate 14  Cycle on 0.1s  Cycle off 5.0s  Impedance 402  Electrode impedance: abnormal    Imaging results:  Abdominal xray 9/13/24:  IMPRESSION: Mildly dilated small bowel in the right lower abdomen, similar to the prior CT. No free air, pneumatosis, or portomesenteric gas. Stimulator devices in the anterior abdominal wall and posterior to the left iliac wing.     CXR 9/13/24:  IMPRESSION: Minimal bibasilar atelectasis/scarring, left greater than right. No effusions or pneumothorax. Heart size is stable. Atherosclerosis. Cholecystectomy. Partially visualized gastric pacer leads. These are better assessed on recent CT.     CT abdomen/pelvis w/o 9/12/24:  FINDINGS:   LOWER CHEST: Normal.  HEPATOBILIARY: Prior cholecystectomy. Few small calcified granulomata.  PANCREAS: Normal.  SPLEEN: Small calcified granulomata.  ADRENAL GLANDS: Tiny benign right adrenal adenoma unchanged.  KIDNEYS/BLADDER: 2 mm nonobstructing stone lower pole right kidney, no right hydronephrosis.  Left kidney normal. Bladder negative.  BOWEL: Dilated loops of fluid-filled small bowel are present within right lower quadrant consistent with small bowel obstruction, caliber of to 3.5 cm. Etiology not clearly evident. Involving bowel loops have a somewhat C-shaped configuration raising   question of possible internal hernia though a definitive hernia diagnosis cannot be made. Proximal small bowel appears normal in caliber.  Wisp of mesenteric edema within the involved region. No free fluid or free air. Colon unremarkable. Gastric   pacemaker in stable position.  LYMPH NODES: Normal.  VASCULATURE: Normal.  PELVIC ORGANS: No pelvic mass. No free fluid.  MUSCULOSKELETAL: Bilateral InterStim wires present with only left-sided power generator in place. No change in the focus of subcutaneous soft tissue stranding mid left abdomen likely a zone of fibrosis and may be a site of a previous implant..                                                                   IMPRESSION:   1.  New distal small bowel obstruction, etiology not clearly evident, internal hernia not excluded.          IMPRESSION:  SBO  GI bleed  This is a 62 y/o female with PMH HTN, type 2 diabetes, seizure disorder, COPD, CVA, GERD, gastroparesis s/p gastric neurostimulator placement, IBS-C, chronic pain, schizoaffective disorder, (no documented Crohn's disease through [facility]) admitted 9/12 for SBO and GI bleed after presenting with abdominal pain and hematemesis.      SBO- CT scan showed distal small bowel obstruction in the RLQ, which is the area of her pain. She does not have Crohn's disease per [facility] record review nor recent colonoscopy in 2021. Surgery consulted, no SBFT due to contrast allergy. She had worsening symptoms 9/13, subsequent CXR/AXR negative for perforation. She had ongoing severe pain 9/14 but refused NG tube for decompression. Fortunately, she is feeling better today and tolerating clear liquid diet.   GI bleed- She reported hematemesis after multiple episodes of vomiting so most likely due to Ashley leung tear vs PUD, gastritis, erosive esophagitis. Given possible SBO and stable hemoglobin (13-14 range) no endoscopic evaluation performed. IV PPI started.   Gastroparesis- She has known gastroparesis s/p gastric neurostimulator since 2013 with battery/device change 2015. Neurostimulator interrogated 9/13 and impedance suggests possible issue with her leads  and imaging shows inappropriate crossing of leads, which may or may not be contributing to her symptoms. Given the age of her device battery life interrogation is not reliable and it is most likely that the neurostimulator is not functioning without battery change since 2015 and lead-crossing also an issue. She will need outpatient follow up with Dr. Greg C[facility]neros at [facility] for likely device exchange.       PLAN:  - Full liquid diet, ADAT  - At discharge transition back to home PPI, omeprazole 20 mg daily  - Continue home bowel regimen with Miralax 17 g daily  - Outpatient surgery follow up for gastric neurostimulator exchange ([facility] will help facilitate this)   - Okay to discharge any time per GI    We will no longer actively follow this patient in-house. Please call if questions arise or patient's status changes.     (Dr. Shukla)  Natalie Chiang PA-C  [facility]  9/14/2024 2:07 PM  [phone] (office)    20 minutes of total time was spent providing patient care, including patient evaluation, reviewing documentation/test results, , and documentation.  ________________________________________________________________________

## 2024-09-15 NOTE — DISCHARGE SUMMARY
"[facility]  Hospitalist Discharge Summary      Date of Admission:  9/12/2024  Date of Discharge:  9/15/2024  Discharging Provider: Stephany Vences MD  Discharge Service: Hospitalist Service    Discharge Diagnoses   Small bowel obstruction  Concern for GI bleed    Clinically Significant Risk Factors     # Obesity: Estimated body mass index is 31.24 kg/m  as calculated from the following:    Height as of this encounter: 1.499 m (4' 11.02\").    Weight as of this encounter: 70.2 kg (154 lb 12.2 oz).       Follow-ups Needed After Discharge   Follow-up Appointments     Follow-up and recommended labs and tests       Follow up with primary care provider (PCP), SHA FRASER,   within 7 days for hospital follow- up.  The following labs/tests are   recommended: CBC, BMP. Outpatient surgery follow up for gastric   neurostimulator exchange ([facility] will help facilitate this).            Unresulted Labs Ordered in the Past 30 Days of this Admission       No orders found from 8/13/2024 to 9/13/2024.          Discharge Disposition   Discharged to home  Condition at discharge: Stable    Hospital Course   Rukhsana Griffith is a 64 yo female presented with abdominal pain, bloating, dark stools, and coffee ground emesis. Admitted for  2/2 SBO with a concerning h/o possible GI bleed, and acute hypoxic respiratory failure. Surgery and GI consulted. Pt was placed NPO, IVF, and pain management. Pt responded to conservative management- passed gas and stool twice. No bleeding. Hemoglobin remained stable. Hypoxia resolved. Detailed course below:  Abdominal pain-resolved  -- Multifactorial: SBO, gastritis, gastroparesis  Small bowel obstruction-resolved  -- CT-abd/pelvis: New distal small bowel obstruction, etiology not clearly evident, internal hernia not excluded  -- Surgery consult appreciated.  -- Tolerated CLD. ADAT  Possible Upper GI bleeding-resolved. Likely Ashley-Franz tear  -- melena, coffee ground " emesis  -- S/p IV PPI bid>> Omeprazole on dicharge  -- GI consult appreciated  -- H/H stable  Abnormal LFTs-resolved  -- Monitor LFTs  -- CT-a/p: Cholecystectomy. Few small calcified granulomata.  -- GI following  Leukocytosis-resolved  -- Likely reactive. Trending down  -- Monitor CBC and temp curve  Acute hypoxic respiratory failure-resolved  -- CXR: minimal bibasilar atelectasis. No effusions or pneumothorax. Heart size is stable.  -- COVID negative. Viral pcr negative  -- IS  Gastroparesis with a gastric stimulator in place  -- GI consult appreciated: OP follow up with Dr. Greg Cisneros at [facility] for likely device exchange  IBS-C  GERD  -- Protonix as above  Essential Hypertension  -- BP borderline. Will hold PTA Lisinopril  -- Monitor vital signs per protocol  Mild intermittent Asthma  -- Not in exacerbation  -- C/w PTA Atrovent, Albuterol, Duoneb prn, Symbicort, flonase  DM-II  -- A1c: 6.8% 4 months ago  -- Home insulin: Lantus 50 units at bedtime  -- Discussed with patient regarding checking her blood sugar and holding or reducing dose based on her blood sugar.  Seizure d/o  -- Topamax   -- Seizure precaution   Hyperlipidemia  -- Not on meds at home  GABRIELLE  -- CPAP  Nicotine dependence  -- Nicoderm  Schizoaffective disorder  -- C/w PTA clozapine   Patient is clinically and hemodynamically stable for discharge. Medication reconciliation was done- medication dosages reviewed before discharge and discussed with pharmacist prior discharge. Medications sent to patient's preferred pharmacy. All labs and radiologic findings discussed with patient. Follow up appointments and recommendations as shown below. Patient verbalized understanding and agreed to plan of care. All questions answered.               Consultations This Hospital Stay   SURGERY GENERAL IP CONSULT  GASTROENTEROLOGY IP CONSULT    Code Status   Full Code    Time Spent on this Encounter   I, Stephany Vences MD, personally saw the patient today and spent  greater than 30 minutes discharging this patient.       Stephany Vences MD  [place] [id number] [facility] [address]  Phone: [phone]  Fax: [phone]  ______________________________________________________________________    Physical Exam   Vital Signs: Temp: 98.2  F (36.8  C) Temp src: Oral BP: (!) 148/65 Pulse: 77   Resp: 18 SpO2: 95 % O2 Device: None (Room air) Oxygen Delivery: 3 LPM  Weight: 154 lbs 12.21 oz  GEN: Alert and oriented. Not in acute distress.  HEENT: Atraumatic, mucous membrane- moist and pink.  Chest: Bilateral air entry.  CVS: S1S2 regular.   Abdomen: Soft. Non-tender, non-distended. No organomegaly. No guarding or rigidity. Bowel sounds active.   Extremities: No pedal edema.  CNS: No involuntary movements.  Skin: no cyanosis or clubbing.        Primary Care Physician   SHA FRASER    Discharge Orders      Reason for your hospital stay    SBO obstruction  Concern for GI bleeding     Follow-up and recommended labs and tests     Follow up with primary care provider (PCP), SHA FRASER, within 7 days for hospital follow- up.  The following labs/tests are recommended: CBC, BMP. Outpatient surgery follow up for gastric neurostimulator exchange ([facility] will help facilitate this).     Activity    Your activity upon discharge: activity as tolerated     Monitor and record    Blood glucose: 4 times a day, before meals and at bedtime.  Blood pressure: daily.     Diet    Follow this diet upon discharge: Current Diet:Orders Placed This Encounter      Full Liquid Diet       Significant Results and Procedures       Discharge Medications   Current Discharge Medication List        START taking these medications    Details   omeprazole (PRILOSEC OTC) 20 MG EC tablet Take 1 tablet (20 mg) by mouth daily.  Qty: 30 tablet, Refills: 1    Associated Diagnoses: Gastroesophageal reflux disease with esophagitis without hemorrhage      polyethylene  glycol (MIRALAX) 17 GM/Dose powder Take 17 g by mouth daily.    Associated Diagnoses: Constipation, unspecified constipation type           CONTINUE these medications which have CHANGED    Details   cloZAPine (CLOZARIL) 25 MG tablet Take 5 tablets (125 mg) by mouth at bedtime.    Associated Diagnoses: Schizoaffective disorder, chronic condition (H)      topiramate (TOPAMAX) 50 MG tablet Take 3 tablets (150 mg) by mouth 2 times daily.    Associated Diagnoses: Chronic seizure disorder with history of head trauma (H)           CONTINUE these medications which have NOT CHANGED    Details   acetaminophen (TYLENOL) 32 mg/mL liquid Take 19.5 mLs by mouth every 4 hours as needed for fever or mild pain.      albuterol (PROAIR HFA/PROVENTIL HFA/VENTOLIN HFA) 108 (90 Base) MCG/ACT inhaler Inhale 1-2 puffs into the lungs every 4 hours as needed for shortness of breath, wheezing or cough.      ammonium lactate (AMLACTIN) 12 % cream [AMMONIUM LACTATE (AMLACTIN) 12 % CREAM] Apply 1 application topically 2 (two) times a day.             aspirin 81 MG EC tablet Take 81 mg by mouth daily.      atropine 1 % ophthalmic solution Place 1 drop under the tongue at bedtime.      blood glucose (ACCU-CHEK GUIDE) test strip Use to test blood sugar 3 times daily or as directed.      EPINEPHrine (EPIPEN) 0.3 mg/0.3 mL atIn [EPINEPHRINE (EPIPEN) 0.3 MG/0.3 ML ATIN] Inject 0.3 mg into the shoulder, thigh, or buttocks as needed.      insulin glargine (LANTUS) 100 unit/mL injection Inject 50 Units Subcutaneous at bedtime      ipratropium (ATROVENT HFA) 17 MCG/ACT inhaler Inhale 1 puff into the lungs 4 times daily.      ipratropium - albuterol 0.5 mg/2.5 mg/3 mL (DUONEB) 0.5-2.5 (3) MG/3ML neb solution Take 1 vial by nebulization every 6 hours as needed for shortness of breath, wheezing or cough.      levalbuterol (XOPENEX HFA) 45 MCG/ACT inhaler Inhale 1-2 puffs into the lungs every 4 hours as needed for shortness of breath or wheezing.       Lidocaine (LIDOCARE) 4 % Patch Place 1 patch onto the skin every 24 hours. To prevent lidocaine toxicity, patient should be patch free for 12 hrs daily.      lisinopril (PRINIVIL,ZESTRIL) 10 MG tablet [LISINOPRIL (PRINIVIL,ZESTRIL) 10 MG TABLET] Take 10 mg by mouth daily.      magnesium citrate solution (NOT currently available) Take 120 mLs by mouth daily as needed for constipation.      melatonin 3 MG tablet Take 3 mg by mouth nightly as needed for sleep.      mometasone (NASONEX) 50 mcg/actuation nasal spray [MOMETASONE (NASONEX) 50 MCG/ACTUATION NASAL SPRAY] 2 sprays into each nostril daily.      montelukast (SINGULAIR) 10 MG tablet Take 10 mg by mouth at bedtime.      multivitamin with minerals (THERA-M) 9 mg iron-400 mcg Tab tablet [MULTIVITAMIN WITH MINERALS (THERA-M) 9 MG IRON-400 MCG TAB TABLET] Take 1 tablet by mouth daily.      nicotine (NICODERM CQ) 14 MG/24HR 24 hr patch Place 1 patch onto the skin every 24 hours.      nicotine (NICODERM CQ) 21 mg/24 hr [NICOTINE (NICODERM CQ) 21 MG/24 HR] Place 1 patch on the skin daily as needed for smoking cessation.             nitroGLYcerin (NITROSTAT) 0.4 MG sublingual tablet Place 0.4 mg under the tongue every 5 minutes as needed for chest pain. For chest pain place 1 tablet under the tongue every 5 minutes for 3 doses. If symptoms persist 5 minutes after 1st dose call 911.      ondansetron (ZOFRAN ODT) 4 MG ODT tab Take 4 mg by mouth every 8 hours as needed for nausea or vomiting.      thin (NO BRAND SPECIFIED) lancets Use to test blood sugar 3 times daily or as directed.      traMADol (ULTRAM) 50 MG tablet Take 50 mg by mouth every 6 hours as needed for severe pain      glucagon, human recombinant, (GLUCAGEN HYPOKIT) 1 mg injection [GLUCAGON, HUMAN RECOMBINANT, (GLUCAGEN HYPOKIT) 1 MG INJECTION] Infuse 1 mg into a venous catheter once as needed (Low blood sugars).                  Allergies   Allergies   Allergen Reactions    Aspirin Unknown     Other  "reaction(s): irritation for stomach takes 81 at home with milk     Bee Sting Kit [Bee Venom] Anaphylaxis    Botox [Onabotulinumtoxina] Hives and Shortness Of Breath    Garlic Hives    Hydrocodone Headache, Hives and Itching    Hydromorphone Other (See Comments) and Rash     Seizures    Hydroxyzine Hives, Other (See Comments), Anaphylaxis and Shortness Of Breath     Dyspnea    Hymenoptera Allergenic Extract [Wasp Venom Protein] Anaphylaxis    Iodinated Contrast Media Hives, Other (See Comments), Itching and Swelling     EDEMA    Latex Shortness Of Breath    Lidocaine Hives     lidoderm patch only; tolerates injectable lidocaine    Onion Hives     green, red, yellow, mushrooms, garlic---all cause pt to have hives    Oxycodone Hives and Rash    Procaine Hives     tolerates injectable bupivacaine and lidocaine      Sucralose Anaphylaxis    Diazepam Rash     also: parasomnia, as pt reported waking up in bathtub    Haloperidol Rash    Ketorolac Rash    Lithium Rash    Meperidine Rash    Diatrizoate Meglumine [Diatrizoate] Hives and Swelling    Green Pepper [Capsicum] Hives    Metformin Other (See Comments)     \"Stomach bleeding and kidney infection\"    Adhesive Tape-Silicones [Adhesive Tape] Rash    [place] [Nuts] Rash    Cephalosporins Rash    Chlorpromazine Rash     THORAZINE    Chlorpropamide Rash    Coconut (Cocos Nucifera) Rash    Codeine Headache     Migraines       Darvocet A500 [Propoxyphene N-Apap] Rash    Dipyridamole Rash    Fentanyl Rash    Fluoxetine Rash    Furosemide Rash    Glyburide Other (See Comments) and Rash     Other reaction(s): hypoglycemia (patient is very sensitive to sulfonylureas)    Ondansetron Rash    Penicillins Rash and Other (See Comments)     Migraines, dizzy and sweating    Percocet [Oxycodone-Acetaminophen] Rash    Shellfish Containing Products [Shellfish-Derived Products] Rash    Sulfa (Sulfonamide Antibiotics) [Sulfa Antibiotics] Rash    Thallium-201 [Thallous Chloride Tl 201] Rash    " Thioridazine Rash    Tizanidine Rash

## 2024-09-15 NOTE — PLAN OF CARE
Discharging to home. All belongings returned. No questions. Will  her 2 new medications from her home pharmacy. Chandrika Feliciano RN
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
## 2024-09-15 NOTE — PLAN OF CARE
Problem: Pain Acute  Goal: Optimal Pain Control and Function  Intervention: Optimize Psychosocial Wellbeing  Recent Flowsheet Documentation  Taken 9/15/2024 0232 by Randi Elise RN  Supportive Measures: active listening utilized  Taken 9/14/2024 2050 by Randi Elise, RN  Supportive Measures: active listening utilized   Goal Outcome Evaluation:         Pt was requesting to leave AMA. House officer informed and pt agreed to stay overnight.  Pt refusing tele, IV fluids and morning blood sugar check. Slept throughout the night.   No concerns for pain. Pt had 1 black formed stool.

## 2024-09-16 ENCOUNTER — PATIENT OUTREACH (OUTPATIENT)
Dept: CARE COORDINATION | Facility: CLINIC | Age: 64
End: 2024-09-16
Payer: MEDICAID

## 2024-09-16 NOTE — PROGRESS NOTES
[facility]: Transitions of Care Outreach  Chief Complaint   Patient presents with    Clinic Care Coordination - Post Hospital       Most Recent Admission Date: 9/12/2024   Most Recent Admission Diagnosis: Small bowel obstruction (H) - K56.609     Most Recent Discharge Date: 9/15/2024   Most Recent Discharge Diagnosis: Small bowel obstruction (H) - K56.609  Chronic seizure disorder with history of head trauma (H) - G40.909, Z87.828  Schizoaffective disorder, chronic condition (H) - F25.9  Constipation, unspecified constipation type - K59.00  Gastroesophageal reflux disease with esophagitis without hemorrhage - K21.00     Transitions of Care Assessment    Discharge Assessment  How are you doing now that you are home?: I am doing okay.  How are your symptoms? (Red Flag symptoms escalate to triage hotline per guidelines): Improved  Do you know how to contact your clinic care team if you have future questions or changes to your health status? : Yes  Does the patient have their discharge instructions? : Yes  Does the patient have questions regarding their discharge instructions? : No  Were you started on any new medications or were there changes to any of your previous medications? : Yes  Does the patient have all of their medications?: No (see comment) (Medications will be delivered to pt.)  Do you have questions regarding any of your medications? : No                  Follow up Plan     Discharge Follow-Up  Discharge follow up appointment scheduled in alignment with recommended follow up timeframe or Transitions of Risk Category? (Low = within 30 days; Moderate= within 14 days; High= within 7 days): Yes  Discharge Follow Up Appointment Date: 09/26/24  Discharge Follow Up Appointment Scheduled with?: Primary Care Provider    No future appointments.    Outpatient Plan as outlined on AVS reviewed with patient.    For any urgent concerns, please contact our 24 hour nurse triage line: [phone]  [phone])       SIGRID Segovia  [phone]  [facility]

## 2024-09-30 LAB — HOLD SPECIMEN: NORMAL

## 2025-02-10 ENCOUNTER — HOSPITAL ENCOUNTER (EMERGENCY)
Facility: HOSPITAL | Age: 65
Discharge: LEFT BEFORE DISCHARGE | End: 2025-02-10
Admitting: EMERGENCY MEDICINE
Payer: MEDICAID

## 2025-02-10 VITALS
HEART RATE: 99 BPM | RESPIRATION RATE: 26 BRPM | TEMPERATURE: 97.8 F | SYSTOLIC BLOOD PRESSURE: 130 MMHG | WEIGHT: 140 LBS | OXYGEN SATURATION: 99 % | BODY MASS INDEX: 26.43 KG/M2 | DIASTOLIC BLOOD PRESSURE: 94 MMHG | HEIGHT: 61 IN

## 2025-02-10 PROCEDURE — 99281 EMR DPT VST MAYX REQ PHY/QHP: CPT

## 2025-02-10 ASSESSMENT — COLUMBIA-SUICIDE SEVERITY RATING SCALE - C-SSRS
6. HAVE YOU EVER DONE ANYTHING, STARTED TO DO ANYTHING, OR PREPARED TO DO ANYTHING TO END YOUR LIFE?: NO
2. HAVE YOU ACTUALLY HAD ANY THOUGHTS OF KILLING YOURSELF IN THE PAST MONTH?: NO
1. IN THE PAST MONTH, HAVE YOU WISHED YOU WERE DEAD OR WISHED YOU COULD GO TO SLEEP AND NOT WAKE UP?: NO

## 2025-02-11 NOTE — ED TRIAGE NOTES
"Patient brought in by ambulance from home with abdominal pain since Friday. Has been having diarrhea and vomiting since Friday. States she has several hard nodules in her abdomen that are painful. Is normally on 3-4L NC at home, history of COPD. States pain is so severe she \"feels like I'm gonna pass out\". Blood pressure checked bilaterally, right arm = 130/94, left arm 128/67     Triage Assessment (Adult)       Row Name 02/10/25 2888          Triage Assessment    Airway WDL WDL        Respiratory WDL    Respiratory WDL WDL        Skin Circulation/Temperature WDL    Skin Circulation/Temperature WDL WDL        Peripheral/Neurovascular WDL    Peripheral Neurovascular WDL WDL        Cognitive/Neuro/Behavioral WDL    Cognitive/Neuro/Behavioral WDL WDL                     "

## 2025-08-12 ENCOUNTER — TRANSCRIBE ORDERS (OUTPATIENT)
Dept: OTHER | Age: 65
End: 2025-08-12

## 2025-08-12 ENCOUNTER — MEDICAL CORRESPONDENCE (OUTPATIENT)
Dept: HEALTH INFORMATION MANAGEMENT | Facility: CLINIC | Age: 65
End: 2025-08-12
Payer: MEDICAID

## 2025-08-12 DIAGNOSIS — Z96.82 SACRAL NERVE STIMULATOR PRESENT: ICD-10-CM

## 2025-08-12 DIAGNOSIS — N39.3 STRESS INCONTINENCE OF URINE: Primary | ICD-10-CM

## 2025-08-13 ENCOUNTER — TELEPHONE (OUTPATIENT)
Dept: UROLOGY | Facility: CLINIC | Age: 65
End: 2025-08-13
Payer: MEDICAID

## (undated) DEVICE — DECANTER VIAL 2006S

## (undated) DEVICE — KIT DRAIN CLOSED WOUND SUCTION MED 400ML RESVR

## (undated) DEVICE — ELECTRODE PATIENT RETURN ADULT L10 FT 2 PLATE CORD 0855C

## (undated) DEVICE — SOL WATER IRRIG 1000ML BOTTLE 2F7114

## (undated) DEVICE — CUSHION INSERT LG PRONE VIEW JACKSON TABLE

## (undated) DEVICE — Device

## (undated) DEVICE — PACK 9X6IN THRP HOT COLD CMPR  MED GEL 80104

## (undated) DEVICE — SOL NACL 0.9% IRRIG 1000ML BOTTLE 2F7124

## (undated) DEVICE — GLOVE SURG PI ULTRA TOUCH M SZ 7 LF 42670

## (undated) DEVICE — ESU HOLSTER PLASTIC DISP E2400

## (undated) DEVICE — GLOVE UNDER INDICATOR PI SZ 7.0 LF 41670

## (undated) DEVICE — DRAPE C-ARM 60X42" 1013

## (undated) DEVICE — DRSG STERI STRIP 1/2X4" R1547

## (undated) DEVICE — GLOVE SURG PI ULTRA TOUCH M SZ 8 LF

## (undated) DEVICE — BONE WAX 2.5GM W31G

## (undated) DEVICE — CATH IV 14GA 2IN REM FLASHPLUG DEHP-FR PVC FR 4251717-02

## (undated) DEVICE — DRSG ADAPTIC 3X8" 6113

## (undated) DEVICE — WRAP B-COOL TERI LUMBAR 08143380

## (undated) DEVICE — SUTURE VICRYL+ 3-0 18IN PS-2 UND VCP497H

## (undated) DEVICE — CUSTOM PACK LUMBAR FUSION SNE5BLFHEA

## (undated) DEVICE — SUTURE VICRYL+ 2-0 27IN CT-1 UND VCP259H

## (undated) DEVICE — DRSG ABD TNDRSRB WET PRUF 8IN X 10IN STRL  9194A

## (undated) DEVICE — SYR 30ML LL W/O NDL 302832

## (undated) DEVICE — GLOVE BIOGEL PI INDICATOR 8.0 LF 41680

## (undated) DEVICE — DRSG DRAIN 4X4" 7086

## (undated) DEVICE — TOOL DISSECT MIDAS MR8 14CM MATCH HEAD 3MM MR8-14MH30

## (undated) DEVICE — DRSG GAUZE 4X4" TRAY 6939

## (undated) DEVICE — SUTURE VICRYL+ 0 27IN CT-1 UND VCP260H

## (undated) DEVICE — POSITIONER ARM CRADLE LAMINECTOMY DISP

## (undated) DEVICE — SUCTION MANIFOLD NEPTUNE 2 SYS 1 PORT 702-025-000

## (undated) DEVICE — DRAPE STERI TOWEL LG 1010

## (undated) RX ORDER — VASOPRESSIN 20 U/ML
INJECTION PARENTERAL
Status: DISPENSED
Start: 2024-05-23

## (undated) RX ORDER — DEXMEDETOMIDINE HYDROCHLORIDE 4 UG/ML
INJECTION, SOLUTION INTRAVENOUS
Status: DISPENSED
Start: 2024-05-23

## (undated) RX ORDER — EPHEDRINE SULFATE 50 MG/ML
INJECTION, SOLUTION INTRAMUSCULAR; INTRAVENOUS; SUBCUTANEOUS
Status: DISPENSED
Start: 2024-05-23

## (undated) RX ORDER — DEXAMETHASONE SODIUM PHOSPHATE 10 MG/ML
INJECTION, EMULSION INTRAMUSCULAR; INTRAVENOUS
Status: DISPENSED
Start: 2024-05-23

## (undated) RX ORDER — LIDOCAINE HYDROCHLORIDE 10 MG/ML
INJECTION, SOLUTION EPIDURAL; INFILTRATION; INTRACAUDAL; PERINEURAL
Status: DISPENSED
Start: 2024-05-23

## (undated) RX ORDER — ONDANSETRON 2 MG/ML
INJECTION INTRAMUSCULAR; INTRAVENOUS
Status: DISPENSED
Start: 2024-05-23

## (undated) RX ORDER — PROPOFOL 10 MG/ML
INJECTION, EMULSION INTRAVENOUS
Status: DISPENSED
Start: 2024-05-23

## (undated) RX ORDER — FENTANYL CITRATE 50 UG/ML
INJECTION, SOLUTION INTRAMUSCULAR; INTRAVENOUS
Status: DISPENSED
Start: 2024-05-23